# Patient Record
Sex: FEMALE | Race: BLACK OR AFRICAN AMERICAN | Employment: FULL TIME | URBAN - METROPOLITAN AREA
[De-identification: names, ages, dates, MRNs, and addresses within clinical notes are randomized per-mention and may not be internally consistent; named-entity substitution may affect disease eponyms.]

---

## 2017-01-03 ENCOUNTER — OFFICE VISIT (OUTPATIENT)
Dept: FAMILY MEDICINE CLINIC | Age: 47
End: 2017-01-03

## 2017-01-03 ENCOUNTER — OFFICE VISIT (OUTPATIENT)
Dept: BARIATRICS/WEIGHT MGMT | Age: 47
End: 2017-01-03

## 2017-01-03 ENCOUNTER — PATIENT MESSAGE (OUTPATIENT)
Dept: CARDIOLOGY CLINIC | Age: 47
End: 2017-01-03

## 2017-01-03 ENCOUNTER — OFFICE VISIT (OUTPATIENT)
Dept: CARDIOLOGY CLINIC | Age: 47
End: 2017-01-03

## 2017-01-03 VITALS
SYSTOLIC BLOOD PRESSURE: 130 MMHG | DIASTOLIC BLOOD PRESSURE: 76 MMHG | WEIGHT: 174 LBS | RESPIRATION RATE: 16 BRPM | HEART RATE: 66 BPM | BODY MASS INDEX: 27.31 KG/M2 | HEIGHT: 67 IN

## 2017-01-03 VITALS
OXYGEN SATURATION: 97 % | DIASTOLIC BLOOD PRESSURE: 87 MMHG | WEIGHT: 170.8 LBS | SYSTOLIC BLOOD PRESSURE: 134 MMHG | BODY MASS INDEX: 26.81 KG/M2 | HEIGHT: 67 IN | RESPIRATION RATE: 17 BRPM | HEART RATE: 75 BPM

## 2017-01-03 DIAGNOSIS — R94.31 ABNORMAL EKG: Primary | ICD-10-CM

## 2017-01-03 DIAGNOSIS — E78.5 DYSLIPIDEMIA: ICD-10-CM

## 2017-01-03 DIAGNOSIS — E66.9 OBESITY, UNSPECIFIED: Primary | ICD-10-CM

## 2017-01-03 DIAGNOSIS — K21.9 GASTROESOPHAGEAL REFLUX DISEASE WITHOUT ESOPHAGITIS: ICD-10-CM

## 2017-01-03 DIAGNOSIS — R06.00 DYSPNEA AND RESPIRATORY ABNORMALITIES: Primary | ICD-10-CM

## 2017-01-03 DIAGNOSIS — J45.20 MILD INTERMITTENT ASTHMA WITHOUT COMPLICATION: ICD-10-CM

## 2017-01-03 DIAGNOSIS — E66.3 OVERWEIGHT (BMI 25.0-29.9): ICD-10-CM

## 2017-01-03 DIAGNOSIS — Z13.6 SCREENING FOR ISCHEMIC HEART DISEASE: ICD-10-CM

## 2017-01-03 DIAGNOSIS — R06.89 DYSPNEA AND RESPIRATORY ABNORMALITIES: Primary | ICD-10-CM

## 2017-01-03 DIAGNOSIS — R94.31 EKG ABNORMALITIES: ICD-10-CM

## 2017-01-03 NOTE — MR AVS SNAPSHOT
Visit Information Date & Time Provider Department Dept. Phone Encounter #  
 1/3/2017  9:30 AM Derick Waller MD [address] [place] [address] [id number] Follow-up Instructions Return in about 1 month (around 2/3/2017). Upcoming Health Maintenance Date Due DTaP/Tdap/Td series (1 - Tdap) 8/13/1993 PAP AKA CERVICAL CYTOLOGY 8/13/1993 INFLUENZA AGE 9 TO ADULT 8/1/2016 Allergies as of 1/3/2017  Review Complete On: 1/3/2017 By: Sandy Jhaveri LPN No Known Allergies Current Immunizations  Never Reviewed No immunizations on file. Not reviewed this visit You Were Diagnosed With   
  
 Codes Comments Screening for ischemic heart disease    -  Primary ICD-10-CM: Z13.6 ICD-9-CM: V81.0 Abnormal EKG     ICD-10-CM: R94.31 
ICD-9-CM: 794.31 Overweight (BMI 25.0-29. 9)     ICD-10-CM: Q63.7 ICD-9-CM: 278.02 Vitals BP Pulse Resp Height(growth percentile) Weight(growth percentile) LMP  
 134/87 75 17 5' 6.5\" (1.689 m) 170 lb 12.8 oz (77.5 kg) 12/15/2016 SpO2 BMI OB Status Smoking Status 97% 27.15 kg/m2 Having regular periods Never Smoker BMI and BSA Data Body Mass Index Body Surface Area  
 27.15 kg/m 2 1.91 m 2 Preferred Pharmacy Pharmacy Name Phone [facility] [phone] Your Updated Medication List  
  
   
This list is accurate as of: 1/3/17 10:51 AM.  Always use your most recent med list.  
  
  
  
  
 albuterol 90 mcg/actuation inhaler Commonly known as:  PROVENTIL HFA, VENTOLIN HFA, PROAIR HFA Take  by inhalation. COPAXONE SC  
by SubCUTAneous route. Follow-up Instructions Return in about 1 month (around 2/3/2017). Referral Information Referral ID Referred By Referred To  
  
 [id number] Pola Sanchez MD   
   [id number] [place], [place] [address] [place] [address] Phone: [phone] Fax: [phone] Visits Status Start Date End Date 1 New Request 1/3/17 1/3/18 If your referral has a status of pending review or denied, additional information will be sent to support the outcome of this decision. Patient Instructions Resistance Training With Free Weights: Exercises Your Care Instructions Here are some examples of exercises for resistance training. Start each exercise slowly. Ease off the exercise if you start to have pain. Your doctor or physical therapist will tell you when you can start these exercises and which ones will work best for you. How to do the exercises Chest fly 1. Lie on a bench or exercise ball, and hold the weights straight up over your chest. Do not lock your elbows. You can keep them slightly bent if that is comfortable for you. 2. Slowly lower your arms, keeping them extended, until the weights are level with your chest, or slightly lower. 3. Slowly raise your arms until you are in the starting position. 4. Repeat 8 to 12 times. 5. Rest for a minute, and repeat the exercise. Lateral raise for the outer part of the shoulder (deltoid) 1. Stand with your feet shoulder-width apart and your knees slightly bent. 2. Bend your arms 90 degrees with your elbows at hip level. With your palms facing in, hold the weights straight in front of you. 3. Slowly lift the weights and your elbows out to the sides to shoulder level, keeping your elbows bent. Keep your shoulders down and relaxed as you lift. If you find you are shrugging your shoulders up toward your ears, your weights may be too heavy. 4. Slowly lower the weights back to your sides. 5. Repeat 8 to 12 times. 6. Rest for a minute, and repeat the exercise. Biceps curls 1. Sit leaning forward with your legs slightly spread and your left hand on your left thigh. 2. Hold the weight in your right hand, and place your right elbow on your right thigh. 3. Slowly curl the weight up and toward your chest. 
4. Slowly lower the weight to the original position. 5. Repeat 8 to 12 times. 6. Rest for a minute, and repeat the exercise. 7. Do the same exercise with your other arm. Follow-up care is a key part of your treatment and safety. Be sure to make and go to all appointments, and call your doctor if you are having problems. It's also a good idea to know your test results and keep a list of the medicines you take. Where can you learn more? Go to http://marv-uvaldo.info/. Enter B026 in the search box to learn more about \"Resistance Training With Free Weights: Exercises. \" Current as of: May 27, 2016 Content Version: 11.1 © 6087-2834 [employer]. Care instructions adapted under license by [employer] (which disclaims liability or warranty for this information). If you have questions about a medical condition or this instruction, always ask your healthcare professional. [address] any warranty or liability for your use of this information. Introducing [facility]! Kalina Aguiar introduces [employer] patient portal. Now you can access parts of your medical record, email your doctor's office, and request medication refills online. 1. In your internet browser, go to [email or web address] 2. Click on the First Time User? Click Here link in the Sign In box. You will see the New Member Sign Up page. 3. Enter your [employer] Access Code exactly as it appears below. You will not need to use this code after youve completed the sign-up process. If you do not sign up before the expiration date, you must request a new code. · [employer] Access Code: [id number] Vista Expires: 3/15/2017 12:36 PM 
 
4.  Enter the last four digits of your Social Security Number (xxxx) and Date of Birth (mm/dd/yyyy) as indicated and click Submit. You will be taken to the next sign-up page. 5. Create a [employer] ID. This will be your [employer] login ID and cannot be changed, so think of one that is secure and easy to remember. 6. Create a [employer] password. You can change your password at any time. 7. Enter your Password Reset Question and Answer. This can be used at a later time if you forget your password. 8. Enter your e-mail address. You will receive e-mail notification when new information is available in [address] 9. Click Sign Up. You can now view and download portions of your medical record. 10. Click the Download Summary menu link to download a portable copy of your medical information. If you have questions, please visit the Frequently Asked Questions section of the [employer] website. Remember, [employer] is NOT to be used for urgent needs. For medical emergencies, dial 911. Now available from your iPhone and Android! Please provide this summary of care documentation to your next provider. Your primary care clinician is listed as Timothy Roper. If you have any questions after today's visit, please call [phone].

## 2017-01-03 NOTE — PROGRESS NOTES
[facility] Progress Note: Initial Physician Visit     Phani Dunn is a 42 y.o. female who is here for medical screening for entering the [facility].   She was referred by a co-worker. Interested in weight loss. She is interested in 30-45 lbs weight loss  CC:  Obesity      Weight History  I personally reviewed the Medical Screening Shiloh Cagle completed by patient and scanned into media section of chart. It includes duration of their obesity, maximum weight, goal weight and weight gain time line (timing), all of which give the context of their obesity AND a Family History of their obesity. Is their Weight Loss Goal entered in to Comments? yes    Weight loss History  I personally reviewed the Medical Screening Shiloh Cagle completed by the patient and scanned into media section of chart. It includes number of weight loss attempts, the weight loss program that patients was most successful using, and if they have any hx of anorectic medication use, including OTC supplements. This captures modifying factors. Significant Medical History  Past Medical History   Diagnosis Date    Asthma     MS (multiple sclerosis) ([facility] [address]        I personally reviewed the Medical Screening Shiloh Cagle completed by the patient and scanned into media section of chart. This allows me to assess associated symptoms that are significant in the assessment of the patient's obesity and the patient's Past Medical History.           Significant Psychosocial History   Has a doctor every diagnosed with Binge Eating Disorder, Bulemia or Anorexia? : no     Compliance  Upcoming Travel? no    Social History  Social History   Substance Use Topics    Smoking status: Never Smoker    Smokeless tobacco: Never Used    Alcohol use 1.2 oz/week     2 Glasses of wine per week      Comment: 2 drinks per week       Exercise  I personally reviewed the [place] completed by the patient and scanned into media section of chart. Review of Systems  See HPI        Objective  Visit Vitals    /87    Pulse 75    Resp 17    Ht 5' 6.5\" (1.689 m)    Wt 170 lb 12.8 oz (77.5 kg)    LMP 12/15/2016    SpO2 97%    BMI 27.15 kg/m2         Weight Metrics 1/3/2017 1/3/2017 1/3/2017 12/15/2016 5/10/2016 4/2/2016   Weight - 174 lb 170 lb 12.8 oz 173 lb 14.4 oz 163 lb 161 lb 4.8 oz   Body Fat % 33.3 - - - - -   BMI - 27.66 kg/m2 27.15 kg/m2 27.86 kg/m2 26.32 kg/m2 26.05 kg/m2       Labs: See HDL labs scanned into Media section       Physical Exam    Vital Signs Reviewed  Weight Management Metrics Reviewed    Appearance: well  HEENT:  Scleral icterus?  no  Neck:  Thyromegaly or nodules? no  Mouth: Large tongue no  Heart:  RRR  Lungs:  clear  Abdomen:     Hepatomegaly? no   Striae present? no  Skin:    Acne?  no   Hirsutism? no   Skin tags? no   Acanthosis Nigricans?  no  Ext:  Edema? no      Assessment & Plan  Encounter Diagnoses   Name Primary?  Screening for ischemic heart disease     Abnormal EKG Yes    Overweight (BMI 25.0-29.9)        1. HDL labs reviewed w/ patient  2. EKG reviewed w/ patient:   Personally reviewed by me, abnormal    QTc = 403 sec (Upper limit is 440 for males & 460 for females)      3. Medication changes include: none    Based on his history, labs and EKG, Dinah Beal is not   a good candidate for the [facility] until evaluated by cardiology    25 min of the 45 minutes face to face time with Jeana Amaya consisted of counseling & coordinating and/or discussing treatment plans in reference to her  overweight The primary encounter diagnosis was Abnormal EKG. Diagnoses of Screening for ischemic heart disease and Overweight (BMI 25.0-29.9) were also pertinent to this visit. I am referring her to cardiology for clearance.  If cardiology clears her we will get her into the next class and start the product

## 2017-01-03 NOTE — PROGRESS NOTES
Chief Complaint   Patient presents with    Weight Management       Body Weight: 170.8  Body Fat%: 33.3  Muscle Mass Weight: 27.4  Body Water Weight: 09.7  Basal Metabolic Rate: 6071  BMI: 27.15

## 2017-01-03 NOTE — PATIENT INSTRUCTIONS
Resistance Training With Free Weights: Exercises  Your Care Instructions  Here are some examples of exercises for resistance training. Start each exercise slowly. Ease off the exercise if you start to have pain. Your doctor or physical therapist will tell you when you can start these exercises and which ones will work best for you. How to do the exercises  Chest fly    1. Lie on a bench or exercise ball, and hold the weights straight up over your chest. Do not lock your elbows. You can keep them slightly bent if that is comfortable for you. 2. Slowly lower your arms, keeping them extended, until the weights are level with your chest, or slightly lower. 3. Slowly raise your arms until you are in the starting position. 4. Repeat 8 to 12 times. 5. Rest for a minute, and repeat the exercise. Lateral raise for the outer part of the shoulder (deltoid)    1. Stand with your feet shoulder-width apart and your knees slightly bent. 2. Bend your arms 90 degrees with your elbows at hip level. With your palms facing in, hold the weights straight in front of you. 3. Slowly lift the weights and your elbows out to the sides to shoulder level, keeping your elbows bent. Keep your shoulders down and relaxed as you lift. If you find you are shrugging your shoulders up toward your ears, your weights may be too heavy. 4. Slowly lower the weights back to your sides. 5. Repeat 8 to 12 times. 6. Rest for a minute, and repeat the exercise. Biceps curls    1. Sit leaning forward with your legs slightly spread and your left hand on your left thigh. 2. Hold the weight in your right hand, and place your right elbow on your right thigh. 3. Slowly curl the weight up and toward your chest.  4. Slowly lower the weight to the original position. 5. Repeat 8 to 12 times. 6. Rest for a minute, and repeat the exercise. 7. Do the same exercise with your other arm. Follow-up care is a key part of your treatment and safety.  Be sure to make and go to all appointments, and call your doctor if you are having problems. It's also a good idea to know your test results and keep a list of the medicines you take. Where can you learn more? Go to http://marv-uvaldo.info/. Enter L273 in the search box to learn more about \"Resistance Training With Free Weights: Exercises. \"  Current as of: May 27, 2016  Content Version: 11.1  © 20064027-0534 [employer]. Care instructions adapted under license by [employer] (which disclaims liability or warranty for this information). If you have questions about a medical condition or this instruction, always ask your healthcare professional. [address] any warranty or liability for your use of this information.

## 2017-01-04 ENCOUNTER — TELEPHONE (OUTPATIENT)
Dept: CARDIOLOGY CLINIC | Age: 47
End: 2017-01-04

## 2017-01-04 RX ORDER — ERGOCALCIFEROL 1.25 MG/1
50000 CAPSULE ORAL
Qty: 6 CAP | Refills: 0 | Status: SHIPPED | OUTPATIENT
Start: 2017-01-04 | End: 2017-02-25

## 2017-01-04 RX ORDER — CHOLECALCIFEROL (VITAMIN D3) 125 MCG
CAPSULE ORAL
COMMUNITY
End: 2017-02-25

## 2017-01-04 RX ORDER — ERGOCALCIFEROL 1.25 MG/1
50000 CAPSULE ORAL
COMMUNITY
End: 2017-01-04 | Stop reason: SDUPTHER

## 2017-01-04 NOTE — TELEPHONE ENCOUNTER
Requested Prescriptions     Signed Prescriptions Disp Refills    ergocalciferol (ERGOCALCIFEROL) 50,000 unit capsule 6 Cap 0     Sig: Take 1 Cap by mouth every seven (7) days.      Authorizing Provider: Enrique Ramos     Ordering User: Marjorie Us

## 2017-01-05 ENCOUNTER — CLINICAL SUPPORT (OUTPATIENT)
Dept: CARDIOLOGY CLINIC | Age: 47
End: 2017-01-05

## 2017-01-05 DIAGNOSIS — R94.31 ABNORMAL EKG: Primary | ICD-10-CM

## 2017-01-05 DIAGNOSIS — R94.31 ECG ABNORMAL: Primary | ICD-10-CM

## 2017-01-05 NOTE — PROGRESS NOTES
See scanned report. Dr. Yamile Curry ordered study and Dr. Yamile Curry read study. ID verified per protocol.

## 2017-01-05 NOTE — PROGRESS NOTES
Nurse note from patient's weekly VLCD / LCD / Maintenance class was reviewed. Pertinent medical concerns were:   none     BP Readings from Last 3 Encounters:   01/03/17 130/76   01/03/17 134/87   12/15/16 126/82       Weight Metrics 1/3/2017 1/3/2017 1/3/2017 12/15/2016 5/10/2016 4/2/2016   Weight - 174 lb 170 lb 12.8 oz 173 lb 14.4 oz 163 lb 161 lb 4.8 oz   Body Fat % 33.3 - - - - -   BMI - 27.66 kg/m2 27.15 kg/m2 27.86 kg/m2 26.32 kg/m2 26.05 kg/m2       Current Outpatient Prescriptions   Medication Sig Dispense Refill    ergocalciferol (ERGOCALCIFEROL) 50,000 unit capsule Take 1 Cap by mouth every seven (7) days. 6 Cap 0    cholecalciferol, vitamin D3, (VITAMIN D3) 2,000 unit tab Take  by mouth.  albuterol (PROVENTIL HFA, VENTOLIN HFA, PROAIR HFA) 90 mcg/actuation inhaler Take  by inhalation.  GLATIRAMER ACETATE (COPAXONE SC) by SubCUTAneous route.

## 2017-01-06 ENCOUNTER — TELEPHONE (OUTPATIENT)
Dept: CARDIOLOGY CLINIC | Age: 47
End: 2017-01-06

## 2017-01-06 NOTE — TELEPHONE ENCOUNTER
The following test results given to patient per Dr. Liliana Brenner:    Stress Echo:  1/17, Normal Limit SE    2 pt identifiers used

## 2017-01-10 ENCOUNTER — OFFICE VISIT (OUTPATIENT)
Dept: BARIATRICS/WEIGHT MGMT | Age: 47
End: 2017-01-10

## 2017-01-10 VITALS
DIASTOLIC BLOOD PRESSURE: 82 MMHG | SYSTOLIC BLOOD PRESSURE: 124 MMHG | HEIGHT: 67 IN | BODY MASS INDEX: 26.53 KG/M2 | HEART RATE: 82 BPM | WEIGHT: 169 LBS

## 2017-01-10 DIAGNOSIS — E66.3 OVERWEIGHT (BMI 25.0-29.9): Primary | ICD-10-CM

## 2017-01-10 NOTE — PROGRESS NOTES
Progress Note: Weekly Education Class in the [facility]   Is there anything that you or the patient needs to let Dr Elias Beavers know about? no  Over the past week, have you experienced any side-effects? Yes, fatigue. Leatha Mendes is a 42 y.o. female who is enrolled in [facility]    Visit Vitals    /82    Pulse 82    Ht 5' 6.5\" (1.689 m)    Wt 169 lb (76.7 kg)    LMP 12/15/2016    BMI 26.87 kg/m2     Weight Metrics 1/10/2017 1/3/2017 1/3/2017 1/3/2017 12/15/2016 5/10/2016 4/2/2016   Weight 169 lb - 174 lb 170 lb 12.8 oz 173 lb 14.4 oz 163 lb 161 lb 4.8 oz   Waist Measure Inches 38.5 - - - - - -   Body Fat % - 33.3 - - - - -   BMI 26.87 kg/m2 - 27.66 kg/m2 27.15 kg/m2 27.86 kg/m2 26.32 kg/m2 26.05 kg/m2         Have you received any other medical care this week? no  If yes, where and for what? Have you had any change in your medications since your last visit? no  If yes what? Did you have any problems adhering to the program last week? no  If yes, please explain:       Eating Habits Over Last Week:  Did you take in 64 oz of non-caloric fluids? yes     Did you consume your 4 meal replacements each day?  yes       Physical Activity Over the Past Week:    Aerobic exercise: 0 min  Resistance exercise: 0 workouts / week

## 2017-01-11 NOTE — PROGRESS NOTES
Nurse note from patient's weekly VLCD / LCD / Maintenance class was reviewed. Pertinent medical concerns were:   none     BP Readings from Last 3 Encounters:   01/10/17 124/82   01/03/17 130/76   01/03/17 134/87       Weight Metrics 1/10/2017 1/3/2017 1/3/2017 1/3/2017 12/15/2016 5/10/2016 4/2/2016   Weight 169 lb - 174 lb 170 lb 12.8 oz 173 lb 14.4 oz 163 lb 161 lb 4.8 oz   Waist Measure Inches 38.5 - - - - - -   Body Fat % - 33.3 - - - - -   BMI 26.87 kg/m2 - 27.66 kg/m2 27.15 kg/m2 27.86 kg/m2 26.32 kg/m2 26.05 kg/m2       Current Outpatient Prescriptions   Medication Sig Dispense Refill    ergocalciferol (ERGOCALCIFEROL) 50,000 unit capsule Take 1 Cap by mouth every seven (7) days. 6 Cap 0    cholecalciferol, vitamin D3, (VITAMIN D3) 2,000 unit tab Take  by mouth.  albuterol (PROVENTIL HFA, VENTOLIN HFA, PROAIR HFA) 90 mcg/actuation inhaler Take  by inhalation.  GLATIRAMER ACETATE (COPAXONE SC) by SubCUTAneous route.

## 2017-01-17 ENCOUNTER — OFFICE VISIT (OUTPATIENT)
Dept: BARIATRICS/WEIGHT MGMT | Age: 47
End: 2017-01-17

## 2017-01-17 VITALS
HEART RATE: 67 BPM | BODY MASS INDEX: 26.21 KG/M2 | SYSTOLIC BLOOD PRESSURE: 114 MMHG | DIASTOLIC BLOOD PRESSURE: 79 MMHG | HEIGHT: 67 IN | WEIGHT: 167 LBS

## 2017-01-17 DIAGNOSIS — E66.3 OVERWEIGHT (BMI 25.0-29.9): Primary | ICD-10-CM

## 2017-01-17 NOTE — PROGRESS NOTES
Progress Note: Weekly Education Class in the [facility]   Is there anything that you or the patient needs to let Dr Tyra Huizar know about? no  Over the past week, have you experienced any side-effects? Yes, major constipation. Phani Dunn is a 42 y.o. female who is enrolled in [facility]    Visit Vitals    /79    Pulse 67    Ht 5' 6.5\" (1.689 m)    Wt 167 lb (75.8 kg)    BMI 26.55 kg/m2     Weight Metrics 1/17/2017 1/10/2017 1/3/2017 1/3/2017 1/3/2017 12/15/2016 5/10/2016   Weight 167 lb 169 lb - 174 lb 170 lb 12.8 oz 173 lb 14.4 oz 163 lb   Waist Measure Inches 35 38.5 - - - - -   Body Fat % - - 33.3 - - - -   BMI 26.55 kg/m2 26.87 kg/m2 - 27.66 kg/m2 27.15 kg/m2 27.86 kg/m2 26.32 kg/m2         Have you received any other medical care this week? no  If yes, where and for what? Have you had any change in your medications since your last visit? no  If yes what? Did you have any problems adhering to the program last week? no  If yes, please explain:       Eating Habits Over Last Week:  Did you take in 64 oz of non-caloric fluids? yes     Did you consume your 4 meal replacements each day?  yes       Physical Activity Over the Past Week:    Aerobic exercise: 0 min  Resistance exercise: 0 workouts / week

## 2017-01-18 NOTE — PROGRESS NOTES
Nurse note from patient's weekly VLCD / LCD / Maintenance class was reviewed. Pertinent medical concerns were:   none     BP Readings from Last 3 Encounters:   01/17/17 114/79   01/10/17 124/82   01/03/17 130/76       Weight Metrics 1/17/2017 1/10/2017 1/3/2017 1/3/2017 1/3/2017 12/15/2016 5/10/2016   Weight 167 lb 169 lb - 174 lb 170 lb 12.8 oz 173 lb 14.4 oz 163 lb   Waist Measure Inches 35 38.5 - - - - -   Body Fat % - - 33.3 - - - -   BMI 26.55 kg/m2 26.87 kg/m2 - 27.66 kg/m2 27.15 kg/m2 27.86 kg/m2 26.32 kg/m2       Current Outpatient Prescriptions   Medication Sig Dispense Refill    ergocalciferol (ERGOCALCIFEROL) 50,000 unit capsule Take 1 Cap by mouth every seven (7) days. 6 Cap 0    cholecalciferol, vitamin D3, (VITAMIN D3) 2,000 unit tab Take  by mouth.  albuterol (PROVENTIL HFA, VENTOLIN HFA, PROAIR HFA) 90 mcg/actuation inhaler Take  by inhalation.  GLATIRAMER ACETATE (COPAXONE SC) by SubCUTAneous route.

## 2017-01-24 ENCOUNTER — OFFICE VISIT (OUTPATIENT)
Dept: BARIATRICS/WEIGHT MGMT | Age: 47
End: 2017-01-24

## 2017-01-24 VITALS
DIASTOLIC BLOOD PRESSURE: 80 MMHG | WEIGHT: 164 LBS | HEIGHT: 67 IN | SYSTOLIC BLOOD PRESSURE: 123 MMHG | BODY MASS INDEX: 25.74 KG/M2 | HEART RATE: 75 BPM

## 2017-01-24 DIAGNOSIS — E66.3 OVERWEIGHT (BMI 25.0-29.9): Primary | ICD-10-CM

## 2017-01-27 NOTE — PROGRESS NOTES
Nurse note from patient's weekly VLCD / LCD / Maintenance class was reviewed. Pertinent medical concerns were:   none     BP Readings from Last 3 Encounters:   01/24/17 123/80   01/17/17 114/79   01/10/17 124/82       Weight Metrics 1/24/2017 1/17/2017 1/10/2017 1/3/2017 1/3/2017 1/3/2017 12/15/2016   Weight 164 lb 167 lb 169 lb - 174 lb 170 lb 12.8 oz 173 lb 14.4 oz   Waist Measure Inches 35 35 38.5 - - - -   Body Fat % - - - 33.3 - - -   BMI 26.07 kg/m2 26.55 kg/m2 26.87 kg/m2 - 27.66 kg/m2 27.15 kg/m2 27.86 kg/m2       Current Outpatient Prescriptions   Medication Sig Dispense Refill    ergocalciferol (ERGOCALCIFEROL) 50,000 unit capsule Take 1 Cap by mouth every seven (7) days. 6 Cap 0    cholecalciferol, vitamin D3, (VITAMIN D3) 2,000 unit tab Take  by mouth.  albuterol (PROVENTIL HFA, VENTOLIN HFA, PROAIR HFA) 90 mcg/actuation inhaler Take  by inhalation.  GLATIRAMER ACETATE (COPAXONE SC) by SubCUTAneous route.

## 2017-01-31 ENCOUNTER — OFFICE VISIT (OUTPATIENT)
Dept: BARIATRICS/WEIGHT MGMT | Age: 47
End: 2017-01-31

## 2017-01-31 DIAGNOSIS — E66.3 OVERWEIGHT (BMI 25.0-29.9): Primary | ICD-10-CM

## 2017-02-01 VITALS
SYSTOLIC BLOOD PRESSURE: 113 MMHG | BODY MASS INDEX: 25.27 KG/M2 | HEART RATE: 73 BPM | WEIGHT: 161 LBS | HEIGHT: 67 IN | DIASTOLIC BLOOD PRESSURE: 79 MMHG

## 2017-02-01 NOTE — PROGRESS NOTES
Progress Note: Weekly Education Class in the [facility] Loss Program   Is there anything that you or the patient needs to let Dr Chey Gerard know about? no  Over the past week, have you experienced any side-effects? no    Skylar Alejandra is a 42 y.o. female who is enrolled in [facility]    Visit Vitals    /79    Pulse 73    Ht 5' 6.5\" (1.689 m)    Wt 161 lb (73 kg)    BMI 25.6 kg/m2     Weight Metrics 2/1/2017 1/31/2017 1/24/2017 1/17/2017 1/10/2017 1/3/2017 1/3/2017   Weight - 161 lb 164 lb 167 lb 169 lb - 174 lb   Waist Measure Inches 34.5 - 35 35 38.5 - -   Body Fat % - - - - - 33.3 -   BMI - 25.6 kg/m2 26.07 kg/m2 26.55 kg/m2 26.87 kg/m2 - 27.66 kg/m2         Have you received any other medical care this week? no  If yes, where and for what? Have you had any change in your medications since your last visit? no  If yes what? Did you have any problems adhering to the program last week? no  If yes, please explain:       Eating Habits Over Last Week:  Did you take in 64 oz of non-caloric fluids? yes     Did you consume your 4 meal replacements each day? Yes, 2 scallops, and grilled asparagus.        Physical Activity Over the Past Week:    Aerobic exercise: 0 min  Resistance exercise: 0 workouts / week

## 2017-02-01 NOTE — PROGRESS NOTES
Nurse note from patient's weekly VLCD / LCD / Maintenance class was reviewed. Pertinent medical concerns were:        BP Readings from Last 3 Encounters:   01/24/17 123/80   01/17/17 114/79   01/10/17 124/82       Weight Metrics 1/24/2017 1/17/2017 1/10/2017 1/3/2017 1/3/2017 1/3/2017 12/15/2016   Weight 164 lb 167 lb 169 lb - 174 lb 170 lb 12.8 oz 173 lb 14.4 oz   Waist Measure Inches 35 35 38.5 - - - -   Body Fat % - - - 33.3 - - -   BMI 26.07 kg/m2 26.55 kg/m2 26.87 kg/m2 - 27.66 kg/m2 27.15 kg/m2 27.86 kg/m2       Current Outpatient Prescriptions   Medication Sig Dispense Refill    ergocalciferol (ERGOCALCIFEROL) 50,000 unit capsule Take 1 Cap by mouth every seven (7) days. 6 Cap 0    cholecalciferol, vitamin D3, (VITAMIN D3) 2,000 unit tab Take  by mouth.  albuterol (PROVENTIL HFA, VENTOLIN HFA, PROAIR HFA) 90 mcg/actuation inhaler Take  by inhalation.  GLATIRAMER ACETATE (COPAXONE SC) by SubCUTAneous route.

## 2017-02-07 ENCOUNTER — CLINICAL SUPPORT (OUTPATIENT)
Dept: BARIATRICS/WEIGHT MGMT | Age: 47
End: 2017-02-07

## 2017-02-07 DIAGNOSIS — E66.3 OVERWEIGHT (BMI 25.0-29.9): Primary | ICD-10-CM

## 2017-02-08 VITALS
HEIGHT: 67 IN | HEART RATE: 77 BPM | WEIGHT: 159 LBS | BODY MASS INDEX: 24.96 KG/M2 | SYSTOLIC BLOOD PRESSURE: 117 MMHG | DIASTOLIC BLOOD PRESSURE: 77 MMHG

## 2017-02-08 NOTE — PROGRESS NOTES
Progress Note: Weekly Education Class in the [facility]   Is there anything that you or the patient needs to let Dr Hiro Howard know about? no  Over the past week, have you experienced any side-effects? no    Duglas Moreno is a 42 y.o. female who is enrolled in [facility]    Visit Vitals    /77    Pulse 77    Ht 5' 6.5\" (1.689 m)    Wt 159 lb (72.1 kg)    BMI 25.28 kg/m2     Weight Metrics 2/8/2017 2/7/2017 2/1/2017 1/31/2017 1/24/2017 1/17/2017 1/10/2017   Weight - 159 lb - 161 lb 164 lb 167 lb 169 lb   Waist Measure Inches 33 - 34.5 - 35 35 38.5   Body Fat % - - - - - - -   BMI - 25.28 kg/m2 - 25.6 kg/m2 26.07 kg/m2 26.55 kg/m2 26.87 kg/m2         Have you received any other medical care this week? no  If yes, where and for what? Have you had any change in your medications since your last visit? no  If yes what? Did you have any problems adhering to the program last week? no  If yes, please explain:       Eating Habits Over Last Week:  Did you take in 64 oz of non-caloric fluids? yes     Did you consume your 4 meal replacements each day?  yes       Physical Activity Over the Past Week:    Aerobic exercise: 0 min  Resistance exercise: 0 workouts / week

## 2017-02-14 ENCOUNTER — OFFICE VISIT (OUTPATIENT)
Dept: FAMILY MEDICINE CLINIC | Age: 47
End: 2017-02-14

## 2017-02-14 VITALS
TEMPERATURE: 97.8 F | OXYGEN SATURATION: 98 % | HEIGHT: 66 IN | BODY MASS INDEX: 25.22 KG/M2 | RESPIRATION RATE: 17 BRPM | SYSTOLIC BLOOD PRESSURE: 119 MMHG | WEIGHT: 156.9 LBS | HEART RATE: 74 BPM | DIASTOLIC BLOOD PRESSURE: 8 MMHG

## 2017-02-14 DIAGNOSIS — E16.1 HYPERINSULINEMIA: ICD-10-CM

## 2017-02-14 DIAGNOSIS — K59.00 CONSTIPATION, UNSPECIFIED CONSTIPATION TYPE: Primary | ICD-10-CM

## 2017-02-14 DIAGNOSIS — E78.49 OTHER HYPERLIPIDEMIA: ICD-10-CM

## 2017-02-14 NOTE — MR AVS SNAPSHOT
Visit Information Date & Time Provider Department Dept. Phone Encounter #  
 2/14/2017 10:30 AM Pam Colindres MD [address] [place] [address] [id number] Follow-up Instructions Return in about 1 month (around 3/14/2017). Upcoming Health Maintenance Date Due Pneumococcal 19-64 Medium Risk (1 of 1 - PPSV23) 8/13/1991 DTaP/Tdap/Td series (1 - Tdap) 8/13/1993 PAP AKA CERVICAL CYTOLOGY 8/13/1993 INFLUENZA AGE 9 TO ADULT 8/1/2016 Allergies as of 2/14/2017  Review Complete On: 2/14/2017 By: Jefferson Ibrahim LPN No Known Allergies Current Immunizations  Never Reviewed No immunizations on file. Not reviewed this visit You Were Diagnosed With   
  
 Codes Comments Constipation, unspecified constipation type    -  Primary ICD-10-CM: K59.00 ICD-9-CM: 564.00 Other hyperlipidemia     ICD-10-CM: E78.4 ICD-9-CM: 272.4 Hyperinsulinemia     ICD-10-CM: E16.1 ICD-9-CM: 251.1 Vitals BP Pulse Temp Resp Height(growth percentile) Weight(growth percentile) (!) 119/8 74 97.8 °F (36.6 °C) (Oral) 17 5' 6\" (1.676 m) 156 lb 14.4 oz (71.2 kg) SpO2 BMI OB Status Smoking Status 98% 25.32 kg/m2 Having regular periods Never Smoker BMI and BSA Data Body Mass Index Body Surface Area  
 25.32 kg/m 2 1.82 m 2 Preferred Pharmacy Pharmacy Name Phone [facility] [phone] Your Updated Medication List  
  
   
This list is accurate as of: 2/14/17 10:46 AM.  Always use your most recent med list.  
  
  
  
  
 albuterol 90 mcg/actuation inhaler Commonly known as:  PROVENTIL HFA, VENTOLIN HFA, PROAIR HFA Take  by inhalation. COPAXONE SC  
by SubCUTAneous route. docusate sodium 50 mg capsule Commonly known as:  Ledell Maria Elena Take 1 Cap by mouth two (2) times a day for 90 days. ergocalciferol 50,000 unit capsule Commonly known as:  ERGOCALCIFEROL Take 1 Cap by mouth every seven (7) days. VITAMIN D3 2,000 unit Tab Generic drug:  cholecalciferol (vitamin D3) Take  by mouth. Prescriptions Sent to Pharmacy Refills  
 docusate sodium (COLACE) 50 mg capsule 2 Sig: Take 1 Cap by mouth two (2) times a day for 90 days. Class: Normal  
 Pharmacy: [facility] [address] #: [phone] Route: Oral  
  
Follow-up Instructions Return in about 1 month (around 3/14/2017). Patient Instructions Constipation: Care Instructions Your Care Instructions Constipation means that you have a hard time passing stools (bowel movements). People pass stools from 3 times a day to once every 3 days. What is normal for you may be different. Constipation may occur with pain in the rectum and cramping. The pain may get worse when you try to pass stools. Sometimes there are small amounts of bright red blood on toilet paper or the surface of stools. This is because of enlarged veins near the rectum (hemorrhoids). A few changes in your diet and lifestyle may help you avoid ongoing constipation. Your doctor may also prescribe medicine to help loosen your stool. Some medicines can cause constipation. These include pain medicines and antidepressants. Tell your doctor about all the medicines you take. Your doctor may want to make a medicine change to ease your symptoms. Follow-up care is a key part of your treatment and safety. Be sure to make and go to all appointments, and call your doctor if you are having problems. It's also a good idea to know your test results and keep a list of the medicines you take. How can you care for yourself at home? · Drink plenty of fluids, enough so that your urine is light yellow or clear like water.  If you have kidney, heart, or liver disease and have to limit fluids, talk with your doctor before you increase the amount of fluids you drink. · Include high-fiber foods in your diet each day. These include fruits, vegetables, beans, and whole grains. · Get at least 30 minutes of exercise on most days of the week. Walking is a good choice. You also may want to do other activities, such as running, swimming, cycling, or playing tennis or team sports. · Take a fiber supplement, such as Citrucel or Metamucil, every day. Read and follow all instructions on the label. · Schedule time each day for a bowel movement. A daily routine may help. Take your time having your bowel movement. · Support your feet with a small step stool when you sit on the toilet. This helps flex your hips and places your pelvis in a squatting position. · Your doctor may recommend an over-the-counter laxative to relieve your constipation. Examples are Milk of Magnesia and MiraLax. Read and follow all instructions on the label. Do not use laxatives on a long-term basis. When should you call for help? Call your doctor now or seek immediate medical care if: 
· You have new or worse belly pain. · You have new or worse nausea or vomiting. · You have blood in your stools. Watch closely for changes in your health, and be sure to contact your doctor if: 
· Your constipation is getting worse. · You do not get better as expected. Where can you learn more? Go to http://marv-uvaldo.info/. Enter 21 [phone] in the search box to learn more about \"Constipation: Care Instructions. \" Current as of: May 27, 2016 Content Version: 11.1 © 1036-0935 [employer]. Care instructions adapted under license by [employer] (which disclaims liability or warranty for this information).  If you have questions about a medical condition or this instruction, always ask your healthcare professional. [address] any warranty or liability for your use of this information. Introducing [facility]! Dear Andrés Smith: Thank you for requesting a [employer] account. Our records indicate that you already have an active Cur[employer] account. You can access your account anytime at [email or web address] Did you know that you can access your hospital and ER discharge instructions at any time in Cur[employer]? You can also review all of your test results from your hospital stay or ER visit. Additional Information If you have questions, please visit the Frequently Asked Questions section of the Cur[employer] website at [email or web address] Remember, Curazy is NOT to be used for urgent needs. For medical emergencies, dial 911. Now available from your iPhone and Android! Please provide this summary of care documentation to your next provider. Your primary care clinician is listed as Sonido Lau. If you have any questions after today's visit, please call [phone].

## 2017-02-14 NOTE — PROGRESS NOTES
[facility] Progress Note:   F/up Physician Visit    CC: Weight Management      She wants to increase the goal weight to 145 lbs    Eva Mandel is a 42 y.o. female who is here for her  f/up physician visit for the VLCD / LCD Program.    Weight Metrics 2/14/2017 2/14/2017 2/8/2017 2/7/2017 2/1/2017 1/31/2017 1/24/2017   Weight - 156 lb 14.4 oz - 159 lb - 161 lb 164 lb   Neck Circ (inches) 12 - - - - - -   Waist Measure Inches 35.5 - 33 - 34.5 - 35   Exercise Mins/week 180 - - - - - -   Body Fat % 30 - - - - - -   BMI - 25.32 kg/m2 - 25.28 kg/m2 - 25.6 kg/m2 26.07 kg/m2         Outpatient Prescriptions Marked as Taking for the 2/14/17 encounter (Office Visit) with Salud Alamo MD   Medication Sig Dispense Refill    docusate sodium (COLACE) 50 mg capsule Take 1 Cap by mouth two (2) times a day for 90 days. 60 Cap 2    ergocalciferol (ERGOCALCIFEROL) 50,000 unit capsule Take 1 Cap by mouth every seven (7) days. 6 Cap 0    cholecalciferol, vitamin D3, (VITAMIN D3) 2,000 unit tab Take  by mouth.  albuterol (PROVENTIL HFA, VENTOLIN HFA, PROAIR HFA) 90 mcg/actuation inhaler Take  by inhalation.  GLATIRAMER ACETATE (COPAXONE SC) by SubCUTAneous route. Participation   Did you attend clinic and class last week? no    Review of Systems  Since your last visit, have you experienced any complications? no  If yes, please list:       Are you taking an appetite suppressant? no  If so, is there any Chest Pain, Palpitations or Dizziness? BP Readings from Last 3 Encounters:   02/14/17 (!) 119/8   02/08/17 117/77   02/01/17 113/79         Have you received any other medical care this week? no  If yes, where and for what? Have you discontinued or changed any medicine or dose of your medicine since your last visit with Dr Marine Bobo? no  If yes, where and for what? Diet  How many ounces of calorie-free liquids did you consume each day?   64 oz    How many meal replacements did you take each day? 4    Did you have any problems adhering to the program?  no If yes, please explain:      Exercise  Aerobic exercise: 180 min  Resistance exercise: 4-5 workouts / week  Any discomfort?  no     If yes, where? Objective  Visit Vitals    BP (!) 119/8    Pulse 74    Temp 97.8 °F (36.6 °C) (Oral)    Resp 17    Ht 5' 6\" (1.676 m)    Wt 156 lb 14.4 oz (71.2 kg)    SpO2 98%    BMI 25.32 kg/m2     No LMP recorded. Physical Exam  Appearance: well,   Mental:A&O x 3, NAD  H:NC/AT,  EENT:   EOMI, PERRL, No scleral icterus  Neck: no bruit or JVD  Lung: clear, No W/R  ABD: soft, active, nontender  Ext:  no Edema  Neuro: nonfocal  Assessment / Plan    Encounter Diagnoses   Name Primary?  Constipation, unspecified constipation type Yes    Other hyperlipidemia     Hyperinsulinemia        1. Weight management well controlled   Progress was reviewed with patient    2. Labs    Latest results reviewed with patient   Lab slip given to pt for f/up HDL labs      15 minutes of the 30 minutes face to face time with Masood Bright consisted of counseling & coordinating and/or discussing treatment plans in reference to her The primary encounter diagnosis was Constipation, unspecified constipation type. Diagnoses of Other hyperlipidemia and Hyperinsulinemia were also pertinent to this visit.

## 2017-02-14 NOTE — PROGRESS NOTES
1. Have you been to the ER, urgent care clinic since your last visit? Hospitalized since your last visit? No    2. Have you seen or consulted any other health care providers outside of the [address] [place] [address] since your last visit? Include any pap smears or colon screening.  No     Chief Complaint   Patient presents with    Weight Management       Body Weight: 156.9  Body Fat%: 30  Muscle Mass Weight: 25.1  Body Water Weight: 20.7  Basal Metabolic Rate: 3586  BMI: 25.32

## 2017-02-23 ENCOUNTER — CLINICAL SUPPORT (OUTPATIENT)
Dept: BARIATRICS/WEIGHT MGMT | Age: 47
End: 2017-02-23

## 2017-02-23 VITALS
WEIGHT: 154 LBS | HEART RATE: 75 BPM | BODY MASS INDEX: 24.75 KG/M2 | SYSTOLIC BLOOD PRESSURE: 127 MMHG | HEIGHT: 66 IN | DIASTOLIC BLOOD PRESSURE: 87 MMHG

## 2017-02-23 DIAGNOSIS — E66.3 OVERWEIGHT (BMI 25.0-29.9): Primary | ICD-10-CM

## 2017-02-23 NOTE — PROGRESS NOTES
Progress Note: Weekly Education Class in the [facility]   Is there anything that you or the patient needs to let Dr Jordyn Beck know about? no  Over the past week, have you experienced any side-effects? no    Sophie Beck is a 42 y.o. female who is enrolled in [facility]    Visit Vitals    /87    Pulse 75    Ht 5' 6\" (1.676 m)    Wt 154 lb (69.9 kg)    BMI 24.86 kg/m2     Weight Metrics 2/23/2017 2/14/2017 2/14/2017 2/8/2017 2/7/2017 2/1/2017 1/31/2017   Weight 154 lb - 156 lb 14.4 oz - 159 lb - 161 lb   Neck Circ (inches) - 12 - - - - -   Waist Measure Inches 31 35.5 - 33 - 34.5 -   Exercise Mins/week - 180 - - - - -   Body Fat % - 30 - - - - -   BMI 24.86 kg/m2 - 25.32 kg/m2 - 25.28 kg/m2 - 25.6 kg/m2         Have you received any other medical care this week? no  If yes, where and for what? Have you had any change in your medications since your last visit? no  If yes what? Did you have any problems adhering to the program last week? no  If yes, please explain:       Eating Habits Over Last Week:  Did you take in 64 oz of non-caloric fluids? yes     Did you consume your 4 meal replacements each day? yes with crab cake and brussel sprouts on Tuesday.        Physical Activity Over the Past Week:    Aerobic exercise: 0 min  Resistance exercise: 0 workouts / week

## 2017-02-24 NOTE — PROGRESS NOTES
Nurse note from patient's weekly VLCD / LCD / Maintenance class was reviewed. Pertinent medical concerns were:   Doing well     BP Readings from Last 3 Encounters:   02/23/17 127/87   02/14/17 (!) 119/8   02/08/17 117/77       Weight Metrics 2/23/2017 2/14/2017 2/14/2017 2/8/2017 2/7/2017 2/1/2017 1/31/2017   Weight 154 lb - 156 lb 14.4 oz - 159 lb - 161 lb   Neck Circ (inches) - 12 - - - - -   Waist Measure Inches 31 35.5 - 33 - 34.5 -   Exercise Mins/week - 180 - - - - -   Body Fat % - 30 - - - - -   BMI 24.86 kg/m2 - 25.32 kg/m2 - 25.28 kg/m2 - 25.6 kg/m2       Current Outpatient Prescriptions   Medication Sig Dispense Refill    docusate sodium (COLACE) 50 mg capsule Take 1 Cap by mouth two (2) times a day for 90 days. 60 Cap 2    ergocalciferol (ERGOCALCIFEROL) 50,000 unit capsule Take 1 Cap by mouth every seven (7) days. 6 Cap 0    cholecalciferol, vitamin D3, (VITAMIN D3) 2,000 unit tab Take  by mouth.  albuterol (PROVENTIL HFA, VENTOLIN HFA, PROAIR HFA) 90 mcg/actuation inhaler Take  by inhalation.  GLATIRAMER ACETATE (COPAXONE SC) by SubCUTAneous route.        Doing well

## 2017-02-25 ENCOUNTER — OFFICE VISIT (OUTPATIENT)
Dept: FAMILY MEDICINE CLINIC | Facility: CLINIC | Age: 47
End: 2017-02-25

## 2017-02-25 VITALS
HEIGHT: 66 IN | BODY MASS INDEX: 24.59 KG/M2 | HEART RATE: 77 BPM | DIASTOLIC BLOOD PRESSURE: 71 MMHG | TEMPERATURE: 98.5 F | RESPIRATION RATE: 17 BRPM | SYSTOLIC BLOOD PRESSURE: 107 MMHG | WEIGHT: 153 LBS

## 2017-02-25 DIAGNOSIS — J30.2 SEASONAL ALLERGIC RHINITIS, UNSPECIFIED ALLERGIC RHINITIS TRIGGER: ICD-10-CM

## 2017-02-25 DIAGNOSIS — B96.89 ACUTE BACTERIAL RHINOSINUSITIS: Primary | ICD-10-CM

## 2017-02-25 DIAGNOSIS — J01.90 ACUTE BACTERIAL RHINOSINUSITIS: Primary | ICD-10-CM

## 2017-02-25 RX ORDER — AMOXICILLIN AND CLAVULANATE POTASSIUM 875; 125 MG/1; MG/1
1 TABLET, FILM COATED ORAL 2 TIMES DAILY
Qty: 14 TAB | Refills: 0 | Status: SHIPPED | OUTPATIENT
Start: 2017-02-25 | End: 2017-03-04

## 2017-02-25 RX ORDER — FLUTICASONE PROPIONATE 50 MCG
SPRAY, SUSPENSION (ML) NASAL
Qty: 1 BOTTLE | Refills: 3
Start: 2017-02-25 | End: 2018-09-13 | Stop reason: ALTCHOICE

## 2017-02-25 NOTE — MR AVS SNAPSHOT
Visit Information Date & Time Provider Department Dept. Phone Encounter #  
 2/25/2017  8:45 AM Elisa Kan, [address] [phone] [id number] Your Appointments 3/2/2017  8:45 AM  
Nurse Visit with WEEKLY [facility] (Jarad Kauffman) Appt Note: Weekly triage [address] [address] [address] [address] [phone]  
  
   
 [address] [address]  
  
    
 3/9/2017  8:45 AM  
Nurse Visit with WEEKLY [facility] (Jarad Kauffman) Appt Note: Weekly triage [address] [address] [address] [address] [phone] Upcoming Health Maintenance Date Due Pneumococcal 19-64 Medium Risk (1 of 1 - PPSV23) 8/13/1991 DTaP/Tdap/Td series (1 - Tdap) 8/13/1993 PAP AKA CERVICAL CYTOLOGY 8/13/1993 INFLUENZA AGE 9 TO ADULT 8/1/2016 Allergies as of 2/25/2017  Review Complete On: 2/25/2017 By: Elisa Kan NP No Known Allergies Current Immunizations  Never Reviewed No immunizations on file. Not reviewed this visit You Were Diagnosed With   
  
 Codes Comments Acute bacterial rhinosinusitis    -  Primary ICD-10-CM: J01.90, B96.89 
ICD-9-CM: 461.9 Seasonal allergic rhinitis, unspecified allergic rhinitis trigger     ICD-10-CM: J30.2 ICD-9-CM: 477.9 Vitals BP  
  
  
  
  
  
 107/71 BMI and BSA Data Body Mass Index Body Surface Area  
 24.69 kg/m 2 1.8 m 2 Preferred Pharmacy Pharmacy Name Phone [id number] [phone], [address] [phone] Your Updated Medication List  
  
   
This list is accurate as of: 2/25/17  9:03 AM.  Always use your most recent med list.  
  
  
  
  
 albuterol 90 mcg/actuation inhaler Commonly known as:  PROVENTIL HFA, VENTOLIN HFA, PROAIR HFA Take 2 Puffs by inhalation every four (4) hours as needed. amoxicillin-clavulanate 875-125 mg per tablet Commonly known as:  AUGMENTIN Take 1 Tab by mouth two (2) times a day for 7 days. COPAXONE SC  
1 Dose by SubCUTAneous route daily. docusate sodium 50 mg capsule Commonly known as:  Harrie Halo Take 1 Cap by mouth two (2) times a day for 90 days. fluticasone 50 mcg/actuation nasal spray Commonly known as:  FLONASE  
2 sprays each nostril one time daily for 30 days. Prescriptions Sent to Pharmacy Refills  
 amoxicillin-clavulanate (AUGMENTIN) 875-125 mg per tablet 0 Sig: Take 1 Tab by mouth two (2) times a day for 7 days. Class: Normal  
 Pharmacy: [address] [place] [address] Ph #: [phone] Route: Oral  
  
To-Do List   
 03/14/2017 9:15 AM  
  Appointment with Lara Sr MD at . [address] ([phone]) Patient Instructions Sinusitis: Care Instructions Your Care Instructions Sinusitis is an infection of the lining of the sinus cavities in your head. Sinusitis often follows a cold. It causes pain and pressure in your head and face. In most cases, sinusitis gets better on its own in 1 to 2 weeks. But some mild symptoms may last for several weeks. Sometimes antibiotics are needed. Follow-up care is a key part of your treatment and safety. Be sure to make and go to all appointments, and call your doctor if you are having problems. It's also a good idea to know your test results and keep a list of the medicines you take. How can you care for yourself at home? · Take an over-the-counter pain medicine, such as acetaminophen (Tylenol), ibuprofen (Advil, Motrin), or naproxen (Aleve). Read and follow all instructions on the label. · If the doctor prescribed antibiotics, take them as directed. Do not stop taking them just because you feel better. You need to take the full course of antibiotics. · Be careful when taking over-the-counter cold or flu medicines and Tylenol at the same time. Many of these medicines have acetaminophen, which is Tylenol. Read the labels to make sure that you are not taking more than the recommended dose. Too much acetaminophen (Tylenol) can be harmful. · Breathe warm, moist air from a steamy shower, a hot bath, or a sink filled with hot water. Avoid cold, dry air. Using a humidifier in your home may help. Follow the directions for cleaning the machine. · Use saline (saltwater) nasal washes to help keep your nasal passages open and wash out mucus and bacteria. You can buy saline nose drops at a grocery store or drugstore. Or you can make your own at home by adding 1 teaspoon of salt and 1 teaspoon of baking soda to 2 cups of distilled water. If you make your own, fill a bulb syringe with the solution, insert the tip into your nostril, and squeeze gently. Nyoka Mano your nose. · Put a hot, wet towel or a warm gel pack on your face 3 or 4 times a day for 5 to 10 minutes each time. · Try a decongestant nasal spray like oxymetazoline (Afrin). Do not use it for more than 3 days in a row. Using it for more than 3 days can make your congestion worse. When should you call for help? Call your doctor now or seek immediate medical care if: 
· You have new or worse swelling or redness in your face or around your eyes. · You have a new or higher fever. Watch closely for changes in your health, and be sure to contact your doctor if: 
· You have new or worse facial pain. · The mucus from your nose becomes thicker (like pus) or has new blood in it. · You are not getting better as expected. Where can you learn more? Go to http://marv-uvaldo.info/. Enter Z162 in the search box to learn more about \"Sinusitis: Care Instructions. \" Current as of: July 29, 2016 Content Version: 11.1 © 7955-9841 [employer].  Care instructions adapted under license by [address] (which disclaims liability or warranty for this information). If you have questions about a medical condition or this instruction, always ask your healthcare professional. [address] any warranty or liability for your use of this information. Amoxicillin/Clavulanate Potassium (By mouth) Amoxicillin (u-nbz-t-JOSEY-in), Clavulanate Potassium (SMYD-bp-md-alec vqm-DBY-ya-um) Treats infections. This medicine is a penicillin antibiotic. Brand Name(s):Augmentin, Augmentin ES-600, Augmentin XR There may be other brand names for this medicine. When This Medicine Should Not Be Used: This medicine is not right for everyone. Do not use it if you had an allergic reaction to amoxicillin, clavulanate, or a similar antibiotic (penicillin or cephalosporin), or if you had liver problems caused by Augmentin®. How to Use This Medicine:  
Liquid, Tablet, Chewable Tablet, Long Acting Tablet · Your doctor will tell you how much medicine to use. Do not use more than directed. · Take this medicine with a snack or at the beginning of a meal to help prevent nausea. · Chewable tablets: Chew the tablet completely before you swallow it. · Measure the oral liquid medicine with a marked measuring spoon, oral syringe, or medicine cup. Shake the medicine well just before you measure each dose. Rinse the spoon or dropper after each use. · Swallow the extended-release tablet whole. Do not crush, break, or chew it. · Take all of the medicine in your prescription to clear up your infection, even if you feel better after the first few doses. · Missed dose: Take a dose as soon as you remember. If it is almost time for your next dose, wait until then and take a regular dose. Do not take extra medicine to make up for a missed dose. · Tablet, extended-release tablet, chewable tablet: Store at room temperature, away from heat, moisture, and direct light. · Oral liquid: Store in the refrigerator. Do not freeze. · Throw away any unused oral liquid after 10 days. Drugs and Foods to Avoid: Ask your doctor or pharmacist before using any other medicine, including over-the-counter medicines, vitamins, and herbal products. · Some medicines can affect how this medicine works. Tell your doctor if you are taking a blood thinner (such as warfarin), allopurinol, or probenecid. Warnings While Using This Medicine: · Tell your doctor if you are pregnant or breastfeeding, or if you have kidney disease, liver disease, or mononucleosis (mono). · Birth control pills may not work as well while you are taking this medicine. Use another form of birth control to prevent pregnancy. · This medicine can cause diarrhea. Call your doctor if the diarrhea becomes severe, does not stop, or is bloody. Do not take any medicine to stop diarrhea until you have talked to your doctor. Diarrhea can occur 2 months or more after you stop taking this medicine. · Tell any doctor or dentist who treats you that you are using this medicine. This medicine may affect certain medical test results. · Call your doctor if your symptoms do not improve or if they get worse. · The chewable tablet and oral liquid contain phenylalanine. Talk to your doctor before you use this medicine if you have phenylketonuria (PKU). · Keep all medicine out of the reach of children. Never share your medicine with anyone. Possible Side Effects While Using This Medicine:  
Call your doctor right away if you notice any of these side effects: · Allergic reaction: Itching or hives, swelling in your face or hands, swelling or tingling in your mouth or throat, chest tightness, trouble breathing · Blistering, peeling, red skin rash · Change in how much or how often you urinate · Dark urine or pale stools, nausea, vomiting, loss of appetite, stomach pain, yellow eyes or skin · Diarrhea that may contain blood, stomach cramps If you notice these less serious side effects, talk with your doctor: · Diaper rash · Mild diarrhea, nausea, vomiting · Tooth discoloration (in children) If you notice other side effects that you think are caused by this medicine, tell your doctor. Call your doctor for medical advice about side effects. You may report side effects to FDA at 4-073-CFY-5413 © 2016 [address] is for End User's use only and may not be sold, redistributed or otherwise used for commercial purposes. The above information is an  only. It is not intended as medical advice for individual conditions or treatments. Talk to your doctor, nurse or pharmacist before following any medical regimen to see if it is safe and effective for you. Introducing [facility]! Dear Qiana Quintana: Thank you for requesting a mii account. Our records indicate that you already have an active mii account. You can access your account anytime at [email or web address] Did you know that you can access your hospital and ER discharge instructions at any time in mii? You can also review all of your test results from your hospital stay or ER visit. Additional Information If you have questions, please visit the Frequently Asked Questions section of the mii website at [email or web address] Remember, mii is NOT to be used for urgent needs. For medical emergencies, dial 911. Now available from your iPhone and Android! Please provide this summary of care documentation to your next provider. Your primary care clinician is listed as Richard Gunn. If you have any questions after today's visit, please call [phone].

## 2017-02-25 NOTE — PROGRESS NOTES
Subjective: (As above and below)     Chief Complaint   Patient presents with    Sinus Infection     she is a 42y.o. year old female who presents for evaluation. 2.5 weeks of sinus pain. Similar to sinus infections in past. Yellow nasal discharge. Has tried mucinex without resolution  Not improving and worsening over past 7 days. No fever or chills. No severe headache. Review of Systems - negative except as listed above    Reviewed PmHx, RxHx, FmHx, SocHx, AllgHx and updated in chart. Family History   Problem Relation Age of Onset    Hypertension Mother     Asthma Father        Past Medical History:   Diagnosis Date    Asthma     MS (multiple sclerosis) ([facility] [address]       Social History     Social History    Marital status:      Spouse name: N/A    Number of children: N/A    Years of education: N/A     Social History Main Topics    Smoking status: Never Smoker    Smokeless tobacco: Never Used    Alcohol use 1.2 oz/week     2 Glasses of wine per week      Comment: 2 drinks per week    Drug use: No    Sexual activity: Not Asked     Other Topics Concern    None     Social History Narrative          Current Outpatient Prescriptions   Medication Sig    amoxicillin-clavulanate (AUGMENTIN) 875-125 mg per tablet Take 1 Tab by mouth two (2) times a day for 7 days.  fluticasone (FLONASE) 50 mcg/actuation nasal spray 2 sprays each nostril one time daily for 30 days.  docusate sodium (COLACE) 50 mg capsule Take 1 Cap by mouth two (2) times a day for 90 days.  albuterol (PROVENTIL HFA, VENTOLIN HFA, PROAIR HFA) 90 mcg/actuation inhaler Take 2 Puffs by inhalation every four (4) hours as needed.  GLATIRAMER ACETATE (COPAXONE SC) 1 Dose by SubCUTAneous route daily. No current facility-administered medications for this visit.         Objective:     Vitals:    02/25/17 0853   BP: 107/71   Pulse: 77   Resp: 17   Temp: 98.5 °F (36.9 °C)   TempSrc: Oral   Weight: 153 lb (69.4 kg)   Height: 5' 6\" (1.676 m)     Physical Examination: General appearance - alert, well appearing, and in no distress  Eyes - pupils equal and reactive, extraocular eye movements intact, sclera anicteric  Ears - bilateral TM's and external ear canals normal  Nose - Pale swollen nasal turbinates bilaterally. No discharge. Mouth - Post nasal drip present. No swelling, exudates or lesions. Chest - clear to auscultation, no wheezes, rales or rhonchi, symmetric air entry  Heart - normal rate, regular rhythm, normal S1, S2, no murmurs, rubs, clicks or gallops  Neurological - cranial nerves II through XII intact      Assessment/ Plan:       ICD-10-CM ICD-9-CM    1. Acute bacterial rhinosinusitis J01.90 461.9 amoxicillin-clavulanate (AUGMENTIN) 875-125 mg per tablet    B96.89     2. Seasonal allergic rhinitis, unspecified allergic rhinitis trigger J30.2 477.9 fluticasone (FLONASE) 50 mcg/actuation nasal spray        1. Acute bacterial rhinosinusitis  > 2 weeks of sinus pain and yellow nasal discharge. Will Rx antibiotic.  - amoxicillin-clavulanate (AUGMENTIN) 875-125 mg per tablet; Take 1 Tab by mouth two (2) times a day for 7 days. Dispense: 14 Tab; Refill: 0   -Take with food and pro-biotic. 2. Seasonal allergic rhinitis, unspecified allergic rhinitis trigger  - fluticasone (FLONASE) 50 mcg/actuation nasal spray; 2 sprays each nostril one time daily for 30 days. Dispense: 1 Bottle; Refill: 3      Follow up in office without at least some relief on antibiotic in next 4 days. Sooner for worsening or new symptoms. I have discussed the diagnosis with the patient and the intended plan as seen in the above orders. The patient has received an after-visit summary and questions were answered concerning future plans.      Medication Side Effects and Warnings were discussed with patient: yes  Patient Past Records were reviewed:  yes    Enrique Barrett NP

## 2017-02-25 NOTE — PATIENT INSTRUCTIONS
Sinusitis: Care Instructions  Your Care Instructions    Sinusitis is an infection of the lining of the sinus cavities in your head. Sinusitis often follows a cold. It causes pain and pressure in your head and face. In most cases, sinusitis gets better on its own in 1 to 2 weeks. But some mild symptoms may last for several weeks. Sometimes antibiotics are needed. Follow-up care is a key part of your treatment and safety. Be sure to make and go to all appointments, and call your doctor if you are having problems. It's also a good idea to know your test results and keep a list of the medicines you take. How can you care for yourself at home? · Take an over-the-counter pain medicine, such as acetaminophen (Tylenol), ibuprofen (Advil, Motrin), or naproxen (Aleve). Read and follow all instructions on the label. · If the doctor prescribed antibiotics, take them as directed. Do not stop taking them just because you feel better. You need to take the full course of antibiotics. · Be careful when taking over-the-counter cold or flu medicines and Tylenol at the same time. Many of these medicines have acetaminophen, which is Tylenol. Read the labels to make sure that you are not taking more than the recommended dose. Too much acetaminophen (Tylenol) can be harmful. · Breathe warm, moist air from a steamy shower, a hot bath, or a sink filled with hot water. Avoid cold, dry air. Using a humidifier in your home may help. Follow the directions for cleaning the machine. · Use saline (saltwater) nasal washes to help keep your nasal passages open and wash out mucus and bacteria. You can buy saline nose drops at a grocery store or drugstore. Or you can make your own at home by adding 1 teaspoon of salt and 1 teaspoon of baking soda to 2 cups of distilled water. If you make your own, fill a bulb syringe with the solution, insert the tip into your nostril, and squeeze gently. Claudio Rolls your nose.   · Put a hot, wet towel or a warm gel pack on your face 3 or 4 times a day for 5 to 10 minutes each time. · Try a decongestant nasal spray like oxymetazoline (Afrin). Do not use it for more than 3 days in a row. Using it for more than 3 days can make your congestion worse. When should you call for help? Call your doctor now or seek immediate medical care if:  · You have new or worse swelling or redness in your face or around your eyes. · You have a new or higher fever. Watch closely for changes in your health, and be sure to contact your doctor if:  · You have new or worse facial pain. · The mucus from your nose becomes thicker (like pus) or has new blood in it. · You are not getting better as expected. Where can you learn more? Go to http://marv-uvaldo.info/. Enter Y080 in the search box to learn more about \"Sinusitis: Care Instructions. \"  Current as of: July 29, 2016  Content Version: 11.1  © 2006-2016 [employer]. Care instructions adapted under license by [employer] (which disclaims liability or warranty for this information). If you have questions about a medical condition or this instruction, always ask your healthcare professional. [place] [address] any warranty or liability for your use of this information. Amoxicillin/Clavulanate Potassium (By mouth)   Amoxicillin (l-enx-m-JOSEY-in), Clavulanate Potassium (GEFN-jg-wy-alec znp-XIK-qr-um)  Treats infections. This medicine is a penicillin antibiotic. Brand Name(s):Augmentin, Augmentin ES-600, Augmentin XR   There may be other brand names for this medicine. When This Medicine Should Not Be Used: This medicine is not right for everyone. Do not use it if you had an allergic reaction to amoxicillin, clavulanate, or a similar antibiotic (penicillin or cephalosporin), or if you had liver problems caused by Augmentin®.    How to Use This Medicine:   Liquid, Tablet, Chewable Tablet, Long Acting Tablet  · Your doctor will tell you how much medicine to use. Do not use more than directed. · Take this medicine with a snack or at the beginning of a meal to help prevent nausea. · Chewable tablets: Chew the tablet completely before you swallow it. · Measure the oral liquid medicine with a marked measuring spoon, oral syringe, or medicine cup. Shake the medicine well just before you measure each dose. Rinse the spoon or dropper after each use. · Swallow the extended-release tablet whole. Do not crush, break, or chew it. · Take all of the medicine in your prescription to clear up your infection, even if you feel better after the first few doses. · Missed dose: Take a dose as soon as you remember. If it is almost time for your next dose, wait until then and take a regular dose. Do not take extra medicine to make up for a missed dose. · Tablet, extended-release tablet, chewable tablet: Store at room temperature, away from heat, moisture, and direct light. · Oral liquid: Store in the refrigerator. Do not freeze. · Throw away any unused oral liquid after 10 days. Drugs and Foods to Avoid:   Ask your doctor or pharmacist before using any other medicine, including over-the-counter medicines, vitamins, and herbal products. · Some medicines can affect how this medicine works. Tell your doctor if you are taking a blood thinner (such as warfarin), allopurinol, or probenecid. Warnings While Using This Medicine:   · Tell your doctor if you are pregnant or breastfeeding, or if you have kidney disease, liver disease, or mononucleosis (mono). · Birth control pills may not work as well while you are taking this medicine. Use another form of birth control to prevent pregnancy. · This medicine can cause diarrhea. Call your doctor if the diarrhea becomes severe, does not stop, or is bloody. Do not take any medicine to stop diarrhea until you have talked to your doctor.  Diarrhea can occur 2 months or more after you stop taking this medicine. · Tell any doctor or dentist who treats you that you are using this medicine. This medicine may affect certain medical test results. · Call your doctor if your symptoms do not improve or if they get worse. · The chewable tablet and oral liquid contain phenylalanine. Talk to your doctor before you use this medicine if you have phenylketonuria (PKU). · Keep all medicine out of the reach of children. Never share your medicine with anyone. Possible Side Effects While Using This Medicine:   Call your doctor right away if you notice any of these side effects:  · Allergic reaction: Itching or hives, swelling in your face or hands, swelling or tingling in your mouth or throat, chest tightness, trouble breathing  · Blistering, peeling, red skin rash  · Change in how much or how often you urinate  · Dark urine or pale stools, nausea, vomiting, loss of appetite, stomach pain, yellow eyes or skin  · Diarrhea that may contain blood, stomach cramps  If you notice these less serious side effects, talk with your doctor:   · Diaper rash  · Mild diarrhea, nausea, vomiting  · Tooth discoloration (in children)  If you notice other side effects that you think are caused by this medicine, tell your doctor. Call your doctor for medical advice about side effects. You may report side effects to FDA at 5-780-FDA-9663  © 2016 [address] is for End User's use only and may not be sold, redistributed or otherwise used for commercial purposes. The above information is an  only. It is not intended as medical advice for individual conditions or treatments. Talk to your doctor, nurse or pharmacist before following any medical regimen to see if it is safe and effective for you.

## 2017-03-02 ENCOUNTER — CLINICAL SUPPORT (OUTPATIENT)
Dept: BARIATRICS/WEIGHT MGMT | Age: 47
End: 2017-03-02

## 2017-03-02 VITALS
DIASTOLIC BLOOD PRESSURE: 77 MMHG | SYSTOLIC BLOOD PRESSURE: 107 MMHG | HEIGHT: 66 IN | WEIGHT: 151.1 LBS | BODY MASS INDEX: 24.28 KG/M2

## 2017-03-02 DIAGNOSIS — E66.9 OBESITY, UNSPECIFIED: Primary | ICD-10-CM

## 2017-03-02 NOTE — PROGRESS NOTES
Progress Note: Weekly Education Class in the [facility] Loss Program   Is there anything that you or the patient needs to let Dr. Chey Gerard know about? no  Over the past week, have you experienced any side-effects? no    Skylar Alejandra is a 42 y.o. female who is enrolled in [facility]    Visit Vitals    /77    Ht 5' 6\" (1.676 m)    Wt 151 lb 1.6 oz (68.5 kg)    LMP 02/23/2017    BMI 24.39 kg/m2     Weight Metrics 3/2/2017 2/25/2017 2/23/2017 2/14/2017 2/14/2017 2/8/2017 2/7/2017   Weight 151 lb 1.6 oz 153 lb 154 lb - 156 lb 14.4 oz - 159 lb   Neck Circ (inches) - - - 12 - - -   Waist Measure Inches 31.5 - 31 35.5 - 33 -   Exercise Mins/week - - - 180 - - -   Body Fat % - - - 30 - - -   BMI 24.39 kg/m2 24.69 kg/m2 24.86 kg/m2 - 25.32 kg/m2 - 25.28 kg/m2         Have you received any other medical care this week? no  If yes, where and for what? Have you had any change in your medications since your last visit? no  If yes what? Did you have any problems adhering to the program last week? no  If yes, please explain:       Eating Habits Over Last Week:  Did you take in 64 oz of non-caloric fluids? yes     Did you consume your 4 meal replacements each day? yes with baked salmon Sunday.       Physical Activity Over the Past Week:    Aerobic exercise: 0 min  Resistance exercise: 0 workouts / week

## 2017-03-02 NOTE — PROGRESS NOTES
Nurse note from patient's weekly VLCD / LCD / Maintenance class was reviewed. Pertinent medical concerns were:   none     BP Readings from Last 3 Encounters:   03/02/17 107/77   02/25/17 107/71   02/23/17 127/87       Weight Metrics 3/2/2017 2/25/2017 2/23/2017 2/14/2017 2/14/2017 2/8/2017 2/7/2017   Weight 151 lb 1.6 oz 153 lb 154 lb - 156 lb 14.4 oz - 159 lb   Neck Circ (inches) - - - 12 - - -   Waist Measure Inches 31.5 - 31 35.5 - 33 -   Exercise Mins/week - - - 180 - - -   Body Fat % - - - 30 - - -   BMI 24.39 kg/m2 24.69 kg/m2 24.86 kg/m2 - 25.32 kg/m2 - 25.28 kg/m2       Current Outpatient Prescriptions   Medication Sig Dispense Refill    fluticasone (FLONASE) 50 mcg/actuation nasal spray 2 sprays each nostril one time daily for 30 days. 1 Bottle 3    docusate sodium (COLACE) 50 mg capsule Take 1 Cap by mouth two (2) times a day for 90 days. 60 Cap 2    albuterol (PROVENTIL HFA, VENTOLIN HFA, PROAIR HFA) 90 mcg/actuation inhaler Take 2 Puffs by inhalation every four (4) hours as needed.  GLATIRAMER ACETATE (COPAXONE SC) 1 Dose by SubCUTAneous route daily.  amoxicillin-clavulanate (AUGMENTIN) 875-125 mg per tablet Take 1 Tab by mouth two (2) times a day for 7 days.  14 Tab 0

## 2017-03-09 ENCOUNTER — CLINICAL SUPPORT (OUTPATIENT)
Dept: BARIATRICS/WEIGHT MGMT | Age: 47
End: 2017-03-09

## 2017-03-09 VITALS
HEIGHT: 66 IN | BODY MASS INDEX: 24.2 KG/M2 | DIASTOLIC BLOOD PRESSURE: 74 MMHG | SYSTOLIC BLOOD PRESSURE: 113 MMHG | WEIGHT: 150.6 LBS

## 2017-03-09 DIAGNOSIS — E66.3 OVERWEIGHT (BMI 25.0-29.9): Primary | ICD-10-CM

## 2017-03-09 NOTE — PROGRESS NOTES
Nurse note from patient's weekly VLCD / LCD / Maintenance class was reviewed. Pertinent medical concerns were:     Doing well   BP Readings from Last 3 Encounters:   03/09/17 113/74   03/02/17 107/77   02/25/17 107/71       Weight Metrics 3/9/2017 3/2/2017 2/25/2017 2/23/2017 2/14/2017 2/14/2017 2/8/2017   Weight 150 lb 9.6 oz 151 lb 1.6 oz 153 lb 154 lb - 156 lb 14.4 oz -   Neck Circ (inches) - - - - 12 - -   Waist Measure Inches 30.5 31.5 - 31 35.5 - 33   Exercise Mins/week - - - - 180 - -   Body Fat % - - - - 30 - -   BMI 24.31 kg/m2 24.39 kg/m2 24.69 kg/m2 24.86 kg/m2 - 25.32 kg/m2 -       Current Outpatient Prescriptions   Medication Sig Dispense Refill    fluticasone (FLONASE) 50 mcg/actuation nasal spray 2 sprays each nostril one time daily for 30 days. 1 Bottle 3    docusate sodium (COLACE) 50 mg capsule Take 1 Cap by mouth two (2) times a day for 90 days. 60 Cap 2    albuterol (PROVENTIL HFA, VENTOLIN HFA, PROAIR HFA) 90 mcg/actuation inhaler Take 2 Puffs by inhalation every four (4) hours as needed.  GLATIRAMER ACETATE (COPAXONE SC) 1 Dose by SubCUTAneous route daily.      -24 lbs so far

## 2017-03-09 NOTE — PROGRESS NOTES
Progress Note: Weekly Education Class in the [facility] Weight Loss Program   Is there anything that you or the patient needs to let Dr. Miriam Griffiths know about? no  Over the past week, have you experienced any side-effects? no    Jose F Alcala is a 42 y.o. female who is enrolled in [facility] Loss Program    Visit Vitals    /74    Ht 5' 6\" (1.676 m)    Wt 150 lb 9.6 oz (68.3 kg)    LMP 02/23/2017    BMI 24.31 kg/m2     Weight Metrics 3/9/2017 3/2/2017 2/25/2017 2/23/2017 2/14/2017 2/14/2017 2/8/2017   Weight 150 lb 9.6 oz 151 lb 1.6 oz 153 lb 154 lb - 156 lb 14.4 oz -   Neck Circ (inches) - - - - 12 - -   Waist Measure Inches 30.5 31.5 - 31 35.5 - 33   Exercise Mins/week - - - - 180 - -   Body Fat % - - - - 30 - -   BMI 24.31 kg/m2 24.39 kg/m2 24.69 kg/m2 24.86 kg/m2 - 25.32 kg/m2 -         Have you received any other medical care this week? no  If yes, where and for what? Have you had any change in your medications since your last visit? no  If yes what? Did you have any problems adhering to the program last week? no  If yes, please explain:       Eating Habits Over Last Week:  Did you take in 64 oz of non-caloric fluids? yes     Did you consume your 4 meal replacements each day? yes with turkey on Sunday.       Physical Activity Over the Past Week:    Aerobic exercise: 0 min  Resistance exercise: 0 workouts / week

## 2017-03-16 ENCOUNTER — OFFICE VISIT (OUTPATIENT)
Dept: BARIATRICS/WEIGHT MGMT | Age: 47
End: 2017-03-16

## 2017-03-16 VITALS
WEIGHT: 143.7 LBS | SYSTOLIC BLOOD PRESSURE: 116 MMHG | HEIGHT: 66 IN | BODY MASS INDEX: 23.09 KG/M2 | DIASTOLIC BLOOD PRESSURE: 77 MMHG

## 2017-03-16 DIAGNOSIS — E66.9 OBESITY, UNSPECIFIED: Primary | ICD-10-CM

## 2017-03-16 NOTE — PROGRESS NOTES
Nurse note from patient's weekly VLCD / LCD / Maintenance class was reviewed. Pertinent medical concerns were:   none  Goal 135 starting weight 175 current 143  ( -32 lbs)   BP Readings from Last 3 Encounters:   03/16/17 116/77   03/09/17 113/74   03/02/17 107/77       Weight Metrics 3/16/2017 3/9/2017 3/2/2017 2/25/2017 2/23/2017 2/14/2017 2/14/2017   Weight 143 lb 11.2 oz 150 lb 9.6 oz 151 lb 1.6 oz 153 lb 154 lb - 156 lb 14.4 oz   Neck Circ (inches) - - - - - 12 -   Waist Measure Inches 31 30.5 31.5 - 31 35.5 -   Exercise Mins/week - - - - - 180 -   Body Fat % - - - - - 30 -   BMI 23.19 kg/m2 24.31 kg/m2 24.39 kg/m2 24.69 kg/m2 24.86 kg/m2 - 25.32 kg/m2       Current Outpatient Prescriptions   Medication Sig Dispense Refill    fluticasone (FLONASE) 50 mcg/actuation nasal spray 2 sprays each nostril one time daily for 30 days. 1 Bottle 3    docusate sodium (COLACE) 50 mg capsule Take 1 Cap by mouth two (2) times a day for 90 days. 60 Cap 2    albuterol (PROVENTIL HFA, VENTOLIN HFA, PROAIR HFA) 90 mcg/actuation inhaler Take 2 Puffs by inhalation every four (4) hours as needed.  GLATIRAMER ACETATE (COPAXONE SC) 1 Dose by SubCUTAneous route daily. Must start exercise at least 3 days a week as a preparation for transition to maintenance.  Exercise is vital to maintain the weight loss

## 2017-03-16 NOTE — PROGRESS NOTES
Progress Note: Weekly Education Class in the [facility] Weight Loss Program   Is there anything that you or the patient needs to let Dr Keely Boucher know about? no  Over the past week, have you experienced any side-effects? no    Greyson Allan is a 42 y.o. female who is enrolled in [facility] Weight Loss Program    Visit Vitals    /77    Ht 5' 6\" (1.676 m)    Wt 143 lb 11.2 oz (65.2 kg)    LMP 02/23/2017    BMI 23.19 kg/m2     Weight Metrics 3/16/2017 3/9/2017 3/2/2017 2/25/2017 2/23/2017 2/14/2017 2/14/2017   Weight 143 lb 11.2 oz 150 lb 9.6 oz 151 lb 1.6 oz 153 lb 154 lb - 156 lb 14.4 oz   Neck Circ (inches) - - - - - 12 -   Waist Measure Inches 31 30.5 31.5 - 31 35.5 -   Exercise Mins/week - - - - - 180 -   Body Fat % - - - - - 30 -   BMI 23.19 kg/m2 24.31 kg/m2 24.39 kg/m2 24.69 kg/m2 24.86 kg/m2 - 25.32 kg/m2         Have you received any other medical care this week? no  If yes, where and for what? Have you had any change in your medications since your last visit? no  If yes what? Did you have any problems adhering to the program last week? no  If yes, please explain:       Eating Habits Over Last Week:  Did you take in 64 oz of non-caloric fluids? yes     Did you consume your 4 meal replacements each day?  yes       Physical Activity Over the Past Week:    Aerobic exercise: 0 min  Resistance exercise: 0 workouts / week

## 2017-04-12 ENCOUNTER — OFFICE VISIT (OUTPATIENT)
Dept: FAMILY MEDICINE CLINIC | Facility: CLINIC | Age: 47
End: 2017-04-12

## 2017-04-12 VITALS
RESPIRATION RATE: 16 BRPM | HEART RATE: 73 BPM | SYSTOLIC BLOOD PRESSURE: 100 MMHG | HEIGHT: 66 IN | DIASTOLIC BLOOD PRESSURE: 63 MMHG | TEMPERATURE: 97.8 F | BODY MASS INDEX: 23.3 KG/M2 | WEIGHT: 145 LBS

## 2017-04-12 DIAGNOSIS — H10.32 ACUTE BACTERIAL CONJUNCTIVITIS OF LEFT EYE: Primary | ICD-10-CM

## 2017-04-12 RX ORDER — POLYMYXIN B SULFATE AND TRIMETHOPRIM 1; 10000 MG/ML; [USP'U]/ML
1 SOLUTION OPHTHALMIC
Qty: 1 BOTTLE | Refills: 0 | Status: SHIPPED | OUTPATIENT
Start: 2017-04-12 | End: 2017-04-19

## 2017-04-12 NOTE — MR AVS SNAPSHOT
Visit Information Date & Time Provider Department Dept. Phone Encounter #  
 4/12/2017 10:00 AM Patel Bernardo, [address] [phone] [id number] Upcoming Health Maintenance Date Due Pneumococcal 19-64 Medium Risk (1 of 1 - PPSV23) 8/13/1991 DTaP/Tdap/Td series (1 - Tdap) 8/13/1993 PAP AKA CERVICAL CYTOLOGY 8/13/1993 INFLUENZA AGE 9 TO ADULT 8/1/2016 Allergies as of 4/12/2017  Review Complete On: 4/12/2017 By: Patel Chang NP No Known Allergies Current Immunizations  Never Reviewed No immunizations on file. Not reviewed this visit You Were Diagnosed With   
  
 Codes Comments Acute bacterial conjunctivitis of left eye    -  Primary ICD-10-CM: H10.32 
ICD-9-CM: 372.03 Vitals BP Pulse Temp Resp Height(growth percentile) Weight(growth percentile) 100/63 73 97.8 °F (36.6 °C) (Oral) 16 5' 6\" (1.676 m) 145 lb (65.8 kg) LMP BMI OB Status Smoking Status 03/28/2017 (Approximate) 23.4 kg/m2 Having regular periods Never Smoker Vitals History BMI and BSA Data Body Mass Index Body Surface Area  
 23.4 kg/m 2 1.75 m 2 Preferred Pharmacy Pharm[address] Name Phone [address] [place] [id number] [facility] [phone] Your Updated Medication List  
  
   
This list is accurate as of: 4/12/17 10:12 AM.  Always use your most recent med list.  
  
  
  
  
 albuterol 90 mcg/actuation inhaler Commonly known as:  PROVENTIL HFA, VENTOLIN HFA, PROAIR HFA Take 2 Puffs by inhalation every four (4) hours as needed. COPAXONE SC  
1 Dose by SubCUTAneous route daily. docusate sodium 50 mg capsule Commonly known as:  Trevor Chihuahua Take 1 Cap by mouth two (2) times a day for 90 days. fluticasone 50 mcg/actuation nasal spray Commonly known as:  FLONASE  
2 sprays each nostril one time daily for 30 days. trimethoprim-polymyxin b ophthalmic solution Commonly known as:  POLYTRIM Administer 1 Drop to left eye five (5) times daily for 7 days. Prescriptions Sent to Pharmacy Refills  
 trimethoprim-polymyxin b (POLYTRIM) ophthalmic solution 0 Sig: Administer 1 Drop to left eye five (5) times daily for 7 days. Class: Normal  
 Pharmacy: [facility], [address] [place] [address] PKY  #: [phone] Route: Left Eye Patient Instructions Pinkeye: Care Instructions Your Care Instructions Pinkeye is redness and swelling of the eye surface and the conjunctiva (the lining of the eyelid and the covering of the white part of the eye). Pinkeye is also called conjunctivitis. Pinkeye is often caused by infection with bacteria or a virus. Dry air, allergies, smoke, and chemicals are other common causes. Pinkeye often clears on its own in 7 to 10 days. Antibiotics only help if the pinkeye is caused by bacteria. Pinkeye caused by infection spreads easily. If an allergy or chemical is causing pinkeye, it will not go away unless you can avoid whatever is causing it. Follow-up care is a key part of your treatment and safety. Be sure to make and go to all appointments, and call your doctor if you are having problems. Its also a good idea to know your test results and keep a list of the medicines you take. How can you care for yourself at home? · Wash your hands often. Always wash them before and after you treat pinkeye or touch your eyes or face. · Use moist cotton or a clean, wet cloth to remove crust. Wipe from the inside corner of the eye to the outside. Use a clean part of the cloth for each wipe. · Put cold or warm wet cloths on your eye a few times a day if the eye hurts. · Do not wear contact lenses or eye makeup until the pinkeye is gone. Throw away any eye makeup you were using when you got pinkeye. Clean your contacts and storage case.  If you wear disposable contacts, use a new pair when your eye has cleared and it is safe to wear contacts again. · If the doctor gave you antibiotic ointment or eyedrops, use them as directed. Use the medicine for as long as instructed, even if your eye starts looking better soon. Keep the bottle tip clean, and do not let it touch the eye area. · To put in eyedrops or ointment: ¨ Tilt your head back, and pull your lower eyelid down with one finger. ¨ Drop or squirt the medicine inside the lower lid. ¨ Close your eye for 30 to 60 seconds to let the drops or ointment move around. ¨ Do not touch the ointment or dropper tip to your eyelashes or any other surface. · Do not share towels, pillows, or washcloths while you have pinkeye. When should you call for help? Call your doctor now or seek immediate medical care if: 
· You have pain in your eye, not just irritation on the surface. · You have a change in vision or loss of vision. · You have an increase in discharge from the eye. · Your eye has not started to improve or begins to get worse within 48 hours after you start using antibiotics. · Pinkeye lasts longer than 7 days. Watch closely for changes in your health, and be sure to contact your doctor if you have any problems. Where can you learn more? Go to http://marv-uvaldo.info/. Enter Y392 in the search box to learn more about \"Pinkeye: Care Instructions. \" Current as of: May 27, 2016 Content Version: 11.2 © 9138-9892 [employer]. Care instructions adapted under license by [facility] (which disclaims liability or warranty for this information). If you have questions about a medical condition or this instruction, always ask your healthcare professional. [place] [address] any warranty or liability for your use of this information. Polymyxin B/Trimethoprim (Into the eye) Polymyxin B (naastasia-ee-MIX-in B), Trimethoprim (trye-METH-oh-prim) Treats eye infections. Brand Name(s):Polytrim There may be other brand names for this medicine. When This Medicine Should Not Be Used: This medicine is not right for everyone. Do not use it if you had an allergic reaction to polymyxin B or trimethoprim. How to Use This Medicine:  
Liquid · Use this medicine as directed. · Wash your hands with soap and water before and after using this medicine. · Lie down or tilt your head back. With your index finger, pull down the lower lid of your eye to form a pocket. · To use the eye drops: Hold the dropper close to your eye with the other hand. Drop the correct number of drops into the pocket made between your lower lid and eyeball. Gently close your eyes. Place your index finger over the inner corner of your eye for 1 minute. Do not rinse or wipe the dropper or allow it to touch anything, including your eye. Put the cap on the bottle right away. Keep the bottle upright when you are not using it. · Keep using this medicine for the full treatment time, even if your infection improves. Your infection might come back if you stop using the medicine too soon. · Missed dose: Use your medicine as soon as you remember. If it is almost time for your next dose, wait until then and use a regular dose. Do not use extra medicine to make up for a missed dose. · Store the medicine at room temperature, away from heat and direct light. Drugs and Foods to Avoid: Ask your doctor or pharmacist before using any other medicine, including over-the-counter medicines, vitamins, and herbal products. Warnings While Using This Medicine: · Tell your doctor if you are pregnant or breastfeeding. · Tell your doctor if you wear contact lenses. You may not be able to wear them while your infection is being treated. · Call your doctor if your symptoms do not improve or if they get worse. · Keep all medicine out of the reach of children. Never share your medicine with anyone. Possible Side Effects While Using This Medicine: Call your doctor right away if you notice any of these side effects: · Allergic reaction: Itching or hives, swelling in your face or hands, swelling or tingling in your mouth or throat, chest tightness, trouble breathing · Increased redness, burning, or pain in your eye · Swelling of your eyelid If you notice these less serious side effects, talk with your doctor: · Mild irritation in your eye when you use the drops If you notice other side effects that you think are caused by this medicine, tell your doctor. Call your doctor for medical advice about side effects. You may report side effects to FDA at 0-562-PNG-9588 © 2016 [address] is for End User's use only and may not be sold, redistributed or otherwise used for commercial purposes. The above information is an  only. It is not intended as medical advice for individual conditions or treatments. Talk to your doctor, nurse or pharmacist before following any medical regimen to see if it is safe and effective for you. Introducing [facility]! Dear Sonny Gunderson: Thank you for requesting a [employer] account. Our records indicate that you already have an active [employer] account. You can access your account anytime at [email or web address] Did you know that you can access your hospital and ER discharge instructions at any time in [employer]? You can also review all of your test results from your hospital stay or ER visit. Additional Information If you have questions, please visit the Frequently Asked Questions section of the [employer] website at [email or web address] Remember, [employer] is NOT to be used for urgent needs. For medical emergencies, dial 911. Now available from your iPhone and Android! Please provide this summary of care documentation to your next provider. Your primary care clinician is listed as Ramandeep Padilla.  If you have any questions after today's visit, please call [phone].

## 2017-04-12 NOTE — PATIENT INSTRUCTIONS
Pinkeye: Care Instructions  Your Care Instructions    Pinkeye is redness and swelling of the eye surface and the conjunctiva (the lining of the eyelid and the covering of the white part of the eye). Pinkeye is also called conjunctivitis. Pinkeye is often caused by infection with bacteria or a virus. Dry air, allergies, smoke, and chemicals are other common causes. Pinkeye often clears on its own in 7 to 10 days. Antibiotics only help if the pinkeye is caused by bacteria. Pinkeye caused by infection spreads easily. If an allergy or chemical is causing pinkeye, it will not go away unless you can avoid whatever is causing it. Follow-up care is a key part of your treatment and safety. Be sure to make and go to all appointments, and call your doctor if you are having problems. Its also a good idea to know your test results and keep a list of the medicines you take. How can you care for yourself at home? · Wash your hands often. Always wash them before and after you treat pinkeye or touch your eyes or face. · Use moist cotton or a clean, wet cloth to remove crust. Wipe from the inside corner of the eye to the outside. Use a clean part of the cloth for each wipe. · Put cold or warm wet cloths on your eye a few times a day if the eye hurts. · Do not wear contact lenses or eye makeup until the pinkeye is gone. Throw away any eye makeup you were using when you got pinkeye. Clean your contacts and storage case. If you wear disposable contacts, use a new pair when your eye has cleared and it is safe to wear contacts again. · If the doctor gave you antibiotic ointment or eyedrops, use them as directed. Use the medicine for as long as instructed, even if your eye starts looking better soon. Keep the bottle tip clean, and do not let it touch the eye area. · To put in eyedrops or ointment:  ¨ Tilt your head back, and pull your lower eyelid down with one finger.   ¨ Drop or squirt the medicine inside the lower lid.  ¨ Close your eye for 30 to 60 seconds to let the drops or ointment move around. ¨ Do not touch the ointment or dropper tip to your eyelashes or any other surface. · Do not share towels, pillows, or washcloths while you have pinkeye. When should you call for help? Call your doctor now or seek immediate medical care if:  · You have pain in your eye, not just irritation on the surface. · You have a change in vision or loss of vision. · You have an increase in discharge from the eye. · Your eye has not started to improve or begins to get worse within 48 hours after you start using antibiotics. · Pinkeye lasts longer than 7 days. Watch closely for changes in your health, and be sure to contact your doctor if you have any problems. Where can you learn more? Go to http://marvRapidEnginesuvaldo.info/. Enter Y392 in the search box to learn more about \"Pinkeye: Care Instructions. \"  Current as of: May 27, 2016  Content Version: 11.2  © 5709-2109 [employer]. Care instructions adapted under license by [employer] (which disclaims liability or warranty for this information). If you have questions about a medical condition or this instruction, always ask your healthcare professional. [address] any warranty or liability for your use of this information. Polymyxin B/Trimethoprim (Into the eye)   Polymyxin B (anastasia-ee-MIX-in B), Trimethoprim (trye-METH-oh-prim)  Treats eye infections. Brand Name(s):Polytrim   There may be other brand names for this medicine. When This Medicine Should Not Be Used: This medicine is not right for everyone. Do not use it if you had an allergic reaction to polymyxin B or trimethoprim. How to Use This Medicine:   Liquid  · Use this medicine as directed. · Wash your hands with soap and water before and after using this medicine. · Lie down or tilt your head back.  With your index finger, pull down the lower lid of your eye to form a pocket. · To use the eye drops: Hold the dropper close to your eye with the other hand. Drop the correct number of drops into the pocket made between your lower lid and eyeball. Gently close your eyes. Place your index finger over the inner corner of your eye for 1 minute. Do not rinse or wipe the dropper or allow it to touch anything, including your eye. Put the cap on the bottle right away. Keep the bottle upright when you are not using it. · Keep using this medicine for the full treatment time, even if your infection improves. Your infection might come back if you stop using the medicine too soon. · Missed dose: Use your medicine as soon as you remember. If it is almost time for your next dose, wait until then and use a regular dose. Do not use extra medicine to make up for a missed dose. · Store the medicine at room temperature, away from heat and direct light. Drugs and Foods to Avoid:      Ask your doctor or pharmacist before using any other medicine, including over-the-counter medicines, vitamins, and herbal products. Warnings While Using This Medicine:   · Tell your doctor if you are pregnant or breastfeeding. · Tell your doctor if you wear contact lenses. You may not be able to wear them while your infection is being treated. · Call your doctor if your symptoms do not improve or if they get worse. · Keep all medicine out of the reach of children. Never share your medicine with anyone.   Possible Side Effects While Using This Medicine:   Call your doctor right away if you notice any of these side effects:  · Allergic reaction: Itching or hives, swelling in your face or hands, swelling or tingling in your mouth or throat, chest tightness, trouble breathing  · Increased redness, burning, or pain in your eye  · Swelling of your eyelid  If you notice these less serious side effects, talk with your doctor:   · Mild irritation in your eye when you use the drops  If you notice other side effects that you think are caused by this medicine, tell your doctor. Call your doctor for medical advice about side effects. You may report side effects to FDA at 9-810-IEI-6903  © 2016 [address] is for End User's use only and may not be sold, redistributed or otherwise used for commercial purposes. The above information is an  only. It is not intended as medical advice for individual conditions or treatments. Talk to your doctor, nurse or pharmacist before following any medical regimen to see if it is safe and effective for you.

## 2017-04-12 NOTE — PROGRESS NOTES
Subjective: (As above and below)     Chief Complaint   Patient presents with   [address]     she is a 42y.o. year old female who presents for evaluation. 2nd day of waking up with gooped closed crusty left eye. It is irritated and slighty itchy. Is not painful. No other URI/cold symptoms. Tried OTC redness relief without resolution. Denies: vision change  Review of Systems - negative except as listed above    Reviewed PmHx, RxHx, FmHx, SocHx, AllgHx and updated in chart. Family History   Problem Relation Age of Onset    Hypertension Mother     Asthma Father        Past Medical History:   Diagnosis Date    Asthma     MS (multiple sclerosis) ([facility] [address]       Social History     Social History    Marital status:      Spouse name: N/A    Number of children: N/A    Years of education: N/A     Social History Main Topics    Smoking status: Never Smoker    Smokeless tobacco: Never Used    Alcohol use 1.2 oz/week     2 Glasses of wine per week      Comment: 2 drinks per week    Drug use: No    Sexual activity: Not Asked     Other Topics Concern    None     Social History Narrative          Current Outpatient Prescriptions   Medication Sig    trimethoprim-polymyxin b (POLYTRIM) ophthalmic solution Administer 1 Drop to left eye five (5) times daily for 7 days.  fluticasone (FLONASE) 50 mcg/actuation nasal spray 2 sprays each nostril one time daily for 30 days.  docusate sodium (COLACE) 50 mg capsule Take 1 Cap by mouth two (2) times a day for 90 days.  albuterol (PROVENTIL HFA, VENTOLIN HFA, PROAIR HFA) 90 mcg/actuation inhaler Take 2 Puffs by inhalation every four (4) hours as needed.  GLATIRAMER ACETATE (COPAXONE SC) 1 Dose by SubCUTAneous route daily. No current facility-administered medications for this visit.         Objective:     Vitals:    04/12/17 1003   BP: 100/63   Pulse: 73   Resp: 16   Temp: 97.8 °F (36.6 °C)   TempSrc: Oral   Weight: 145 lb (65.8 kg)   Height: 5' 6\" (1.676 m)     Physical Examination:  Eyes - PERRLA. Minimal left conjunctival erythema. No discharge. Ears - bilateral TM's and external ear canals normal  Nose - normal and patent, no erythema, discharge or polyps  Mouth - mucous membranes moist, pharynx normal without lesions  Chest - clear to auscultation, no wheezes, rales or rhonchi, symmetric air entry  Heart - normal rate, regular rhythm, normal S1, S2, no murmurs, rubs, clicks or gallops      Assessment/ Plan:       ICD-10-CM ICD-9-CM    1. Acute bacterial conjunctivitis of left eye H10.32 372.03 trimethoprim-polymyxin b (POLYTRIM) ophthalmic solution      1. Acute bacterial conjunctivitis of left eye  - trimethoprim-polymyxin b (POLYTRIM) ophthalmic solution; Administer 1 Drop to left eye five (5) times daily for 7 days. Dispense: 1 Bottle; Refill: 0  -Moist compresses    Follow up without some relief in next 5 days. Immediate follow up required for new or worsening symptoms. I have discussed the diagnosis with the patient and the intended plan as seen in the above orders. The patient has received an after-visit summary and questions were answered concerning future plans.      Medication Side Effects, Adverse Effects, Risks, Benefits and Warnings and how to take medication properly were discussed with patient: yes  Patient Past Records were reviewed:  yes    Margarita Vicente NP

## 2017-05-16 ENCOUNTER — OFFICE VISIT (OUTPATIENT)
Dept: FAMILY MEDICINE CLINIC | Facility: CLINIC | Age: 47
End: 2017-05-16

## 2017-05-16 VITALS
BODY MASS INDEX: 23.89 KG/M2 | DIASTOLIC BLOOD PRESSURE: 78 MMHG | TEMPERATURE: 98.5 F | WEIGHT: 148 LBS | SYSTOLIC BLOOD PRESSURE: 120 MMHG | RESPIRATION RATE: 18 BRPM | HEART RATE: 82 BPM
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
## 2017-05-16 DIAGNOSIS — S90.562A INSECT BITE OF ANKLE, INFECTED, LEFT, INITIAL ENCOUNTER: Primary | ICD-10-CM

## 2017-05-16 DIAGNOSIS — L08.9 INSECT BITE OF ANKLE, INFECTED, LEFT, INITIAL ENCOUNTER: Primary | ICD-10-CM

## 2017-05-16 RX ORDER — SULFAMETHOXAZOLE AND TRIMETHOPRIM 800; 160 MG/1; MG/1
1 TABLET ORAL 2 TIMES DAILY
Qty: 14 TAB | Refills: 0 | Status: SHIPPED | OUTPATIENT
Start: 2017-05-16 | End: 2017-05-23

## 2017-05-16 RX ORDER — PREDNISONE 10 MG/1
TABLET ORAL
Qty: 9 TAB | Refills: 0 | Status: SHIPPED | OUTPATIENT
Start: 2017-05-16 | End: 2017-12-08 | Stop reason: CLARIF

## 2017-05-16 NOTE — PATIENT INSTRUCTIONS
Insect Stings and Bites: Care Instructions  Your Care Instructions  Stings and bites from bees, wasps, ants, and other insects often cause pain, swelling, redness, and itching. In some people, especially children, the redness and swelling may be worse. It may extend several inches beyond the affected area. But in most cases, stings and bites don't cause reactions all over the body. If you have had a reaction to an insect sting or bite, you are at risk for a reaction if you get stung or bitten again. Follow-up care is a key part of your treatment and safety. Be sure to make and go to all appointments, and call your doctor if you are having problems. It's also a good idea to know your test results and keep a list of the medicines you take. How can you care for yourself at home? · Do not scratch or rub the skin where the sting or bite occurred. · Put a cold pack or ice cube on the area. Put a thin cloth between the ice and your skin. For some people, a paste of baking soda mixed with a little water helps relieve pain and decrease the reaction. · Take an over-the-counter antihistamine, such as diphenhydramine (Benadryl) or loratadine (Claritin), to relieve swelling, redness, and itching. Calamine lotion or hydrocortisone cream may also help. Do not give antihistamines to your child unless you have checked with the doctor first.  · Be safe with medicines. If your doctor prescribed medicine for your allergy, take it exactly as prescribed. Call your doctor if you think you are having a problem with your medicine. You will get more details on the specific medicines your doctor prescribes. · Your doctor may prescribe a shot of epinephrine to carry with you in case you have a severe reaction. Learn how and when to give yourself the shot, and keep it with you at all times. Make sure it has not . · Go to the emergency room anytime you have a severe reaction.  Go even if you have given yourself epinephrine and are feeling better. Symptoms can come back. When should you call for help? Call 911 anytime you think you may need emergency care. For example, call if:  · You have symptoms of a severe allergic reaction. These may include:  ¨ Sudden raised, red areas (hives) all over your body. ¨ Swelling of the throat, mouth, lips, or tongue. ¨ Trouble breathing. ¨ Passing out (losing consciousness). Or you may feel very lightheaded or suddenly feel weak, confused, or restless. Call your doctor now or seek immediate medical care if:  · You have symptoms of an allergic reaction not right at the sting or bite, such as:  ¨ A rash or small area of hives (raised, red areas on the skin). ¨ Itching. ¨ Swelling. ¨ Belly pain, nausea, or vomiting. · You have a lot of swelling around the site (such as your entire arm or leg is swollen). · You have signs of infection, such as:  ¨ Increased pain, swelling, redness, or warmth around the sting. ¨ Red streaks leading from the area. ¨ Pus draining from the sting. ¨ A fever. Watch closely for changes in your health, and be sure to contact your doctor if:  · You do not get better as expected. Where can you learn more? Go to http://marv-uvaldo.info/. Enter P390 in the search box to learn more about \"Insect Stings and Bites: Care Instructions. \"  Current as of: July 28, 2016  Content Version: 11.2  © 5326-1512 [facility]. Care instructions adapted under license by [employer] (which disclaims liability or warranty for this information). If you have questions about a medical condition or this instruction, always ask your healthcare professional. [place] [address] any warranty or liability for your use of this information. Sulfamethoxazole/Trimethoprim (By mouth)   Sulfamethoxazole (sul-fa-meth-OX-a-zole), Trimethoprim (trye-METH-oh-prim)  Treats or prevents infections. Brand Name(s):  Bactrim, Bactrim DS, SMZ-TMP Pediatric, Sulfatrim, Sulfatrim Pediatric   There may be other brand names for this medicine. When This Medicine Should Not Be Used: This medicine is not right for everyone. Do not use it if you had an allergic reaction to trimethoprim, sulfamethoxazole, or any sulfa drug. Do not use this medicine if you are pregnant, if you have anemia caused by low levels of folic acid, or if you have a history of drug-induced thrombocytopenia. How to Use This Medicine:   Liquid, Tablet  · Your doctor will tell you how much medicine to use. Do not use more than directed. · Measure the oral liquid medicine with a marked measuring spoon, oral syringe, or medicine cup. · Drink extra fluids so you will urinate more often and help prevent kidney problems. · Take all of the medicine in your prescription to clear up your infection, even if you feel better after the first few doses. · Missed dose: Take a dose as soon as you remember. If it is almost time for your next dose, wait until then and take a regular dose. Do not take extra medicine to make up for a missed dose. · Store the medicine in a closed container at room temperature, away from heat, moisture, and direct light. Do not freeze the oral liquid. Drugs and Foods to Avoid:   Ask your doctor or pharmacist before using any other medicine, including over-the-counter medicines, vitamins, and herbal products. · Some medicines can affect how this medicine works. Tell your doctor if you also use the following:   ¨ amantadine, cyclosporine, digoxin, indomethacin, memantine, methotrexate, phenytoin, pyrimethamine, or warfarin  ¨ an ACE inhibitor, diabetes medicine (glipizide, glyburide, metformin, pioglitazone, repaglinide, rosiglitazone), a diuretic (water pill, such as hydrochlorothiazide), or a tricyclic antidepressant  Warnings While Using This Medicine:   · It is not safe to take this medicine during pregnancy. It could harm an unborn baby.  Tell your doctor right away if you become pregnant. · Tell your doctor if you are breastfeeding, or if you have kidney disease, liver disease, diabetes, malabsorption or malnutrition, folate deficiency, porphyria, thyroid problems, or a history of alcoholism. Tell your doctor if you have asthma or severe allergies, especially if you are allergic to any medicines. It is important for your doctor to know if you have HIV or AIDS, because this medicine might work differently for you. · This medicine may cause a severe allergic reaction. · This medicine may lower the number of platelets in your body, which are necessary for proper blood clotting. This may cause you to bleed or get infections more easily. Talk with your doctor if you have concerns about this. · This medicine can cause diarrhea. Call your doctor if the diarrhea becomes severe, does not stop, or is bloody. Do not take any medicine to stop diarrhea until you have talked to your doctor. Diarrhea can occur 2 months or more after you stop taking this medicine. · Tell any doctor or dentist who treats you that you are using this medicine. This medicine may affect certain medical test results. · Your doctor will do lab tests at regular visits to check on the effects of this medicine. Keep all appointments. · Keep all medicine out of the reach of children. Never share your medicine with anyone.   Possible Side Effects While Using This Medicine:   Call your doctor right away if you notice any of these side effects:  · Allergic reaction: Itching or hives, swelling in your face or hands, swelling or tingling in your mouth or throat, chest tightness, trouble breathing  · Blistering, peeling, or red skin rash  · Dark urine or pale stools, nausea, vomiting, loss of appetite, stomach pain, yellow skin or eyes  · Chest pain, cough, or trouble breathing  · Confusion, weakness  · Muscle twitching  · Severe diarrhea, stomach pain, cramps, bloating  · Skin rash, purple spots on your skin, or very pale or yellow skin  · Sore throat, fever, muscle pain  · Uneven heartbeat, numbness or tingling in your hands, feet, or lips  · Unusual bleeding, bruising, or weakness  If you notice these less serious side effects, talk with your doctor:   · Mild nausea, vomiting, or loss of appetite  If you notice other side effects that you think are caused by this medicine, tell your doctor. Call your doctor for medical advice about side effects. You may report side effects to FDA at 2-143-PQF-0080  © 2017 [facility] Information is for End User's use only and may not be sold, redistributed or otherwise used for commercial purposes. The above information is an  only. It is not intended as medical advice for individual conditions or treatments. Talk to your doctor, nurse or pharmacist before following any medical regimen to see if it is safe and effective for you. Oral Corticosteroids: Care Instructions  Your Care Instructions  Oral corticosteroids are commonly used medicines. They help calm down the body's response to inflammation. Oral means that they are taken by mouth. This is most often in the form of a pill. They are used for treating many conditions. You may take them for asthma, COPD, back pain, or allergic reactions. They are also used for other conditions such as autoimmune diseases and certain types of cancer. You may have side effects from taking this medicine. These include nausea, headache, dizziness, and anxiety. Pregnant women should not take this medicine unless their doctor tells them to. Follow your doctor's instructions on how to take this medicine. If you are taking it for 2 weeks or more, your doctor may give you special instructions to slowly reduce (taper) the amount you take. Slowly cutting down on the medicine over time helps your body adjust to the change. Follow-up care is a key part of your treatment and safety.  Be sure to make and go to all appointments, and call your doctor if you are having problems. It's also a good idea to know your test results and keep a list of the medicines you take. How can you care for yourself at home? · Be safe with medicines. Take your medicines exactly as prescribed. Call your doctor if you think you are having a problem with your medicine. You will get more details on the specific medicines your doctor prescribes. · Take your medicine after a meal. It may cause nausea if you take it on an empty stomach. · Avoid taking nonsteroidal anti-inflammatory drugs (NSAIDs) while you are taking oral corticosteroids. Taking both of these medicines might cause an upset stomach. NSAIDs include ibuprofen (Advil, Motrin) and naproxen (Aleve). · If you have a history of stomach ulcers, you may want to avoid taking this medicine and an NSAID at the same time. This can cause stomach upset or bleeding. · Follow your doctor's instructions for how to stop taking this medicine. You may need to taper it. This means the medicine should be slowly reduced. Do not stop taking the medicine all at once. When should you call for help? Call 911 if:  · You vomit blood or what looks like coffee grounds. Call your doctor now or seek immediate medical care if:  · Your symptoms are getting worse. · You are dizzy or lightheaded, or you feel like you may faint. · You have new or worse nausea or vomiting. · You have stomach pain that is getting worse. · Your stools are black. Watch closely for changes in your health, and be sure to contact your doctor if:  · You do not get better as expected. Where can you learn more? Go to http://marv-uvaldo.info/. Enter A384 in the search box to learn more about \"Oral Corticosteroids: Care Instructions. \"  Current as of: May 23, 2016  Content Version: 11.2  © 6289-9582 [employer].  Care instructions adapted under license by [employer] (which disclaims liability or warranty for this information). If you have questions about a medical condition or this instruction, always ask your healthcare professional. [place] [address] any warranty or liability for your use of this information.

## 2017-05-16 NOTE — PROGRESS NOTES
Subjective: (As above and below)     Chief Complaint   Patient presents with   [address]     she is a 42y.o. year old female who presents for evaluation. Here for bug bite on left ankle. Itchy spot on left lower ankle first noticed after sitting outside in yard. Has been scratching a lot. Today has gotten more swollen with redness. Still itchy but not as bad as day prior. Some discomfort but no quantifiable pain. PMH: does mention history of large local reactions to mosquito bites. Denies: fever/chills, abdominal pain, n/v, headache  Review of Systems - negative except as listed above    Reviewed PmHx, RxHx, FmHx, SocHx, AllgHx and updated in chart. Family History   Problem Relation Age of Onset    Hypertension Mother     Asthma Father        Past Medical History:   Diagnosis Date    Asthma     MS (multiple sclerosis) ([facility] [address]       Social History     Social History    Marital status:      Spouse name: N/A    Number of children: N/A    Years of education: N/A     Social History Main Topics    Smoking status: Never Smoker    Smokeless tobacco: Never Used    Alcohol use 1.2 oz/week     2 Glasses of wine per week      Comment: 2 drinks per week    Drug use: No    Sexual activity: Not Asked     Other Topics Concern    None     Social History Narrative          Current Outpatient Prescriptions   Medication Sig    predniSONE (DELTASONE) 10 mg tablet Take 4 tabs by mouth one time daily on day 1.   3 tabs on day 2.  2 tabs on day 3. Take with food.  trimethoprim-sulfamethoxazole (BACTRIM DS, SEPTRA DS) 160-800 mg per tablet Take 1 Tab by mouth two (2) times a day for 7 days.  fluticasone (FLONASE) 50 mcg/actuation nasal spray 2 sprays each nostril one time daily for 30 days.  albuterol (PROVENTIL HFA, VENTOLIN HFA, PROAIR HFA) 90 mcg/actuation inhaler Take 2 Puffs by inhalation every four (4) hours as needed.     GLATIRAMER ACETATE (COPAXONE SC) 1 Dose by SubCUTAneous route daily. No current facility-administered medications for this visit. Objective:     Vitals:    05/16/17 1608   BP: 120/78   Pulse: 82   Resp: 18   Temp: 98.5 °F (36.9 °C)   TempSrc: Oral   Weight: 148 lb (67.1 kg)     Physical Examination:   General appearance - alert, well appearing, and in no distress  Mental status - alert, oriented to person, place, and time, normal mood, behavior, speech, dress, motor activity, and thought processes  Lymphatics - no palpable lymphadenopathy  Chest - clear to auscultation, no wheezes, rales or rhonchi, symmetric air entry  Extremities - lower extremity peripheral pulses normal, good color. Skin - Approx 6cm  x6cm mildly erythematous circular area of swelling with centralized 2mm x 2mm excoriation. There is no discharge or streaking. Assessment/ Plan:       ICD-10-CM ICD-9-CM    1. Insect bite of ankle, infected, left, initial encounter S90.562A 916.5 predniSONE (DELTASONE) 10 mg tablet    L08.9 E906.4 trimethoprim-sulfamethoxazole (BACTRIM DS, SEPTRA DS) 160-800 mg per tablet    W57. XXXA          1. Insect bite of ankle, infected, left, initial encounter    Inflammation vs early infection. History of Large localized reaction to mosquito bites. No systemic signs/symptoms. Discussed treatment options with patient. Will cover with antibiotic.  3 day short taper oral corticosteroid to reduce inflammation.  -Cool compresses  -Monitor for worsening signs of infection which we have discussed    RX:  - predniSONE (DELTASONE) 10 mg tablet; Take 4 tabs by mouth one time daily on day 1.   3 tabs on day 2.  2 tabs on day 3. Take with food. Dispense: 9 Tab; Refill: 0  - trimethoprim-sulfamethoxazole (BACTRIM DS, SEPTRA DS) 160-800 mg per tablet; Take 1 Tab by mouth two (2) times a day for 7 days. Dispense: 14 Tab; Refill: 0    Follow up:  Immediate in office follow up for new or worsening symptoms or without some improvement in next 2-3 days.      I have discussed the diagnosis with the patient and the intended plan as seen in the above orders. The patient has received an after-visit summary and questions were answered concerning future plans.      Medication Side Effects, Adverse Effects, Risks, Benefits and Warnings and how to take medication properly were discussed with patient: yes  Patient Past Records were reviewed:  yes    Margarita Vicente NP

## 2017-05-16 NOTE — MR AVS SNAPSHOT
Visit Information Date & Time Provider Department Dept. Phone Encounter #  
 5/16/2017  4:00 PM Hugo Vinson, [address] [phone] [id number] Upcoming Health Maintenance Date Due Pneumococcal 19-64 Medium Risk (1 of 1 - PPSV23) 8/13/1991 DTaP/Tdap/Td series (1 - Tdap) 8/13/1993 PAP AKA CERVICAL CYTOLOGY 8/13/1993 INFLUENZA AGE 9 TO ADULT 8/1/2017 Allergies as of 5/16/2017  Review Complete On: 5/16/2017 By: Hugo Vinson NP No Known Allergies Current Immunizations  Never Reviewed No immunizations on file. Not reviewed this visit You Were Diagnosed With   
  
 Codes Comments Insect bite of ankle, infected, left, initial encounter    -  Primary ICD-10-CM: S90.562A, L08.9, W57. Pedro Nohemy ICD-9-CM: 916.5, E906.4 Vitals BP Pulse Temp Resp Weight(growth percentile) LMP  
 120/78 82 98.5 °F (36.9 °C) (Oral) 18 148 lb (67.1 kg) 05/12/2017 BMI OB Status Smoking Status 23.89 kg/m2 Having regular periods Never Smoker Vitals History BMI and BSA Data Body Mass Index Body Surface Area  
 23.89 kg/m 2 1.77 m 2 Preferred Pharmacy Pharmacy Name Phone [address] [id number] [facility] [phone] Your Updated Medication List  
  
   
This list is accurate as of: 5/16/17  4:18 PM.  Always use your most recent med list.  
  
  
  
  
 albuterol 90 mcg/actuation inhaler Commonly known as:  PROVENTIL HFA, VENTOLIN HFA, PROAIR HFA Take 2 Puffs by inhalation every four (4) hours as needed. COPAXONE SC  
1 Dose by SubCUTAneous route daily. fluticasone 50 mcg/actuation nasal spray Commonly known as:  FLONASE  
2 sprays each nostril one time daily for 30 days. predniSONE 10 mg tablet Commonly known as:  Lowella Clunes Take 4 tabs by mouth one time daily on day 1.   3 tabs on day 2.  2 tabs on day 3. Take with food. trimethoprim-sulfamethoxazole 160-800 mg per tablet Commonly known as:  BACTRIM DS, SEPTRA DS Take 1 Tab by mouth two (2) times a day for 7 days. Prescriptions Sent to Pharmacy Refills  
 predniSONE (DELTASONE) 10 mg tablet 0 Sig: Take 4 tabs by mouth one time daily on day 1.   3 tabs on day 2.  2 tabs on day 3. Take with food. Class: Normal  
 Pharmacy: [facility] [address] Ph #: [phone]  
 trimethoprim-sulfamethoxazole (BACTRIM DS, SEPTRA DS) 160-800 mg per tablet 0 Sig: Take 1 Tab by mouth two (2) times a day for 7 days. Class: Normal  
 Pharmacy: [facility] [address] Ph #: [phone] Route: Oral  
  
Patient Instructions Insect Stings and Bites: Care Instructions Your Care Instructions Stings and bites from bees, wasps, ants, and other insects often cause pain, swelling, redness, and itching. In some people, especially children, the redness and swelling may be worse. It may extend several inches beyond the affected area. But in most cases, stings and bites don't cause reactions all over the body. If you have had a reaction to an insect sting or bite, you are at risk for a reaction if you get stung or bitten again. Follow-up care is a key part of your treatment and safety. Be sure to make and go to all appointments, and call your doctor if you are having problems. It's also a good idea to know your test results and keep a list of the medicines you take. How can you care for yourself at home? · Do not scratch or rub the skin where the sting or bite occurred. · Put a cold pack or ice cube on the area. Put a thin cloth between the ice and your skin. For some people, a paste of baking soda mixed with a little water helps relieve pain and decrease the reaction.  
· Take an over-the-counter antihistamine, such as diphenhydramine (Benadryl) or loratadine (Claritin), to relieve swelling, redness, and itching. Calamine lotion or hydrocortisone cream may also help. Do not give antihistamines to your child unless you have checked with the doctor first. 
· Be safe with medicines. If your doctor prescribed medicine for your allergy, take it exactly as prescribed. Call your doctor if you think you are having a problem with your medicine. You will get more details on the specific medicines your doctor prescribes. · Your doctor may prescribe a shot of epinephrine to carry with you in case you have a severe reaction. Learn how and when to give yourself the shot, and keep it with you at all times. Make sure it has not . · Go to the emergency room anytime you have a severe reaction. Go even if you have given yourself epinephrine and are feeling better. Symptoms can come back. When should you call for help? Call 911 anytime you think you may need emergency care. For example, call if: 
· You have symptoms of a severe allergic reaction. These may include: 
¨ Sudden raised, red areas (hives) all over your body. ¨ Swelling of the throat, mouth, lips, or tongue. ¨ Trouble breathing. ¨ Passing out (losing consciousness). Or you may feel very lightheaded or suddenly feel weak, confused, or restless. Call your doctor now or seek immediate medical care if: 
· You have symptoms of an allergic reaction not right at the sting or bite, such as: ¨ A rash or small area of hives (raised, red areas on the skin). ¨ Itching. ¨ Swelling. ¨ Belly pain, nausea, or vomiting. · You have a lot of swelling around the site (such as your entire arm or leg is swollen). · You have signs of infection, such as: 
¨ Increased pain, swelling, redness, or warmth around the sting. ¨ Red streaks leading from the area. ¨ Pus draining from the sting. ¨ A fever. Watch closely for changes in your health, and be sure to contact your doctor if: · You do not get better as expected. Where can you learn more? Go to http://marv-uvaldo.info/. Enter P390 in the search box to learn more about \"Insect Stings and Bites: Care Instructions. \" Current as of: July 28, 2016 Content Version: 11.2 © 2978-1654 [employer]. Care instructions adapted under license by [employer] (which disclaims liability or warranty for this information). If you have questions about a medical condition or this instruction, always ask your healthcare professional. [address] any warranty or liability for your use of this information. Sulfamethoxazole/Trimethoprim (By mouth) Sulfamethoxazole (sul-fa-meth-OX-a-zole), Trimethoprim (trye-METH-oh-prim) Treats or prevents infections. Brand Name(s): Bactrim, Bactrim DS, SMZ-TMP Pediatric, Sulfatrim, Sulfatrim Pediatric There may be other brand names for this medicine. When This Medicine Should Not Be Used: This medicine is not right for everyone. Do not use it if you had an allergic reaction to trimethoprim, sulfamethoxazole, or any sulfa drug. Do not use this medicine if you are pregnant, if you have anemia caused by low levels of folic acid, or if you have a history of drug-induced thrombocytopenia. How to Use This Medicine:  
Liquid, Tablet · Your doctor will tell you how much medicine to use. Do not use more than directed. · Measure the oral liquid medicine with a marked measuring spoon, oral syringe, or medicine cup. · Drink extra fluids so you will urinate more often and help prevent kidney problems. · Take all of the medicine in your prescription to clear up your infection, even if you feel better after the first few doses. · Missed dose: Take a dose as soon as you remember. If it is almost time for your next dose, wait until then and take a regular dose. Do not take extra medicine to make up for a missed dose. · Store the medicine in a closed container at room temperature, away from heat, moisture, and direct light. Do not freeze the oral liquid. Drugs and Foods to Avoid: Ask your doctor or pharmacist before using any other medicine, including over-the-counter medicines, vitamins, and herbal products. · Some medicines can affect how this medicine works. Tell your doctor if you also use the following:  
¨ amantadine, cyclosporine, digoxin, indomethacin, memantine, methotrexate, phenytoin, pyrimethamine, or warfarin ¨ an ACE inhibitor, diabetes medicine (glipizide, glyburide, metformin, pioglitazone, repaglinide, rosiglitazone), a diuretic (water pill, such as hydrochlorothiazide), or a tricyclic antidepressant Warnings While Using This Medicine: · It is not safe to take this medicine during pregnancy. It could harm an unborn baby. Tell your doctor right away if you become pregnant. · Tell your doctor if you are breastfeeding, or if you have kidney disease, liver disease, diabetes, malabsorption or malnutrition, folate deficiency, porphyria, thyroid problems, or a history of alcoholism. Tell your doctor if you have asthma or severe allergies, especially if you are allergic to any medicines. It is important for your doctor to know if you have HIV or AIDS, because this medicine might work differently for you. · This medicine may cause a severe allergic reaction. · This medicine may lower the number of platelets in your body, which are necessary for proper blood clotting. This may cause you to bleed or get infections more easily. Talk with your doctor if you have concerns about this. · This medicine can cause diarrhea. Call your doctor if the diarrhea becomes severe, does not stop, or is bloody. Do not take any medicine to stop diarrhea until you have talked to your doctor. Diarrhea can occur 2 months or more after you stop taking this medicine.  
· Tell any doctor or dentist who treats you that you are using this medicine. This medicine may affect certain medical test results. · Your doctor will do lab tests at regular visits to check on the effects of this medicine. Keep all appointments. · Keep all medicine out of the reach of children. Never share your medicine with anyone. Possible Side Effects While Using This Medicine:  
Call your doctor right away if you notice any of these side effects: · Allergic reaction: Itching or hives, swelling in your face or hands, swelling or tingling in your mouth or throat, chest tightness, trouble breathing · Blistering, peeling, or red skin rash · Dark urine or pale stools, nausea, vomiting, loss of appetite, stomach pain, yellow skin or eyes · Chest pain, cough, or trouble breathing · Confusion, weakness · Muscle twitching · Severe diarrhea, stomach pain, cramps, bloating · Skin rash, purple spots on your skin, or very pale or yellow skin · Sore throat, fever, muscle pain · Uneven heartbeat, numbness or tingling in your hands, feet, or lips · Unusual bleeding, bruising, or weakness If you notice these less serious side effects, talk with your doctor: · Mild nausea, vomiting, or loss of appetite If you notice other side effects that you think are caused by this medicine, tell your doctor. Call your doctor for medical advice about side effects. You may report side effects to FDA at 6-597-FDA-0517 © 2017 [address] Information is for End User's use only and may not be sold, redistributed or otherwise used for commercial purposes. The above information is an  only. It is not intended as medical advice for individual conditions or treatments. Talk to your doctor, nurse or pharmacist before following any medical regimen to see if it is safe and effective for you. Oral Corticosteroids: Care Instructions Your Care Instructions Oral corticosteroids are commonly used medicines.  They help calm down the body's response to inflammation. Oral means that they are taken by mouth. This is most often in the form of a pill. They are used for treating many conditions. You may take them for asthma, COPD, back pain, or allergic reactions. They are also used for other conditions such as autoimmune diseases and certain types of cancer. You may have side effects from taking this medicine. These include nausea, headache, dizziness, and anxiety. Pregnant women should not take this medicine unless their doctor tells them to. Follow your doctor's instructions on how to take this medicine. If you are taking it for 2 weeks or more, your doctor may give you special instructions to slowly reduce (taper) the amount you take. Slowly cutting down on the medicine over time helps your body adjust to the change. Follow-up care is a key part of your treatment and safety. Be sure to make and go to all appointments, and call your doctor if you are having problems. It's also a good idea to know your test results and keep a list of the medicines you take. How can you care for yourself at home? · Be safe with medicines. Take your medicines exactly as prescribed. Call your doctor if you think you are having a problem with your medicine. You will get more details on the specific medicines your doctor prescribes. · Take your medicine after a meal. It may cause nausea if you take it on an empty stomach. · Avoid taking nonsteroidal anti-inflammatory drugs (NSAIDs) while you are taking oral corticosteroids. Taking both of these medicines might cause an upset stomach. NSAIDs include ibuprofen (Advil, Motrin) and naproxen (Aleve). · If you have a history of stomach ulcers, you may want to avoid taking this medicine and an NSAID at the same time. This can cause stomach upset or bleeding. · Follow your doctor's instructions for how to stop taking this medicine. You may need to taper it.  This means the medicine should be slowly reduced. Do not stop taking the medicine all at once. When should you call for help? Call 911 if: 
· You vomit blood or what looks like coffee grounds. Call your doctor now or seek immediate medical care if: 
· Your symptoms are getting worse. · You are dizzy or lightheaded, or you feel like you may faint. · You have new or worse nausea or vomiting. · You have stomach pain that is getting worse. · Your stools are black. Watch closely for changes in your health, and be sure to contact your doctor if: 
· You do not get better as expected. Where can you learn more? Go to http://marv-uvaldo.info/. Enter H691 in the search box to learn more about \"Oral Corticosteroids: Care Instructions. \" Current as of: May 23, 2016 Content Version: 11.2 © 2866-4660 [employer]. Care instructions adapted under license by [employer] (which disclaims liability or warranty for this information). If you have questions about a medical condition or this instruction, always ask your healthcare professional. [place] [address] any warranty or liability for your use of this information. Introducing [facility]! Dear Cynthia Nava: Thank you for requesting a [employer] account. Our records indicate that you already have an active [employer] account. You can access your account anytime at [email or web address] Did you know that you can access your hospital and ER discharge instructions at any time in [employer]? You can also review all of your test results from your hospital stay or ER visit. Additional Information If you have questions, please visit the Frequently Asked Questions section of the [employer] website at [email or web address] Remember, [employer] is NOT to be used for urgent needs. For medical emergencies, dial 911. Now available from your iPhone and Android! Please provide this summary of care documentation to your next provider. Your primary care clinician is listed as Bobbi Arredondo. If you have any questions after today's visit, please call [phone].

## 2017-10-31 ENCOUNTER — OFFICE VISIT (OUTPATIENT)
Dept: NEUROLOGY | Age: 47
End: 2017-10-31

## 2017-10-31 VITALS
BODY MASS INDEX: 23.3 KG/M2 | HEART RATE: 74 BPM | RESPIRATION RATE: 12 BRPM | DIASTOLIC BLOOD PRESSURE: 82 MMHG | WEIGHT: 145 LBS | HEIGHT: 66 IN | OXYGEN SATURATION: 98 % | SYSTOLIC BLOOD PRESSURE: 124 MMHG

## 2017-10-31 DIAGNOSIS — G37.3 ACUTE TRANSVERSE MYELITIS IN DEMYELINATING DISEASE OF CENTRAL NERVOUS SYSTEM (HCC): ICD-10-CM

## 2017-10-31 DIAGNOSIS — E53.8 B12 DEFICIENCY: ICD-10-CM

## 2017-10-31 DIAGNOSIS — G35 MULTIPLE SCLEROSIS (HCC): Primary | ICD-10-CM

## 2017-10-31 DIAGNOSIS — E55.9 VITAMIN D DEFICIENCY: ICD-10-CM

## 2017-10-31 RX ORDER — CHOLECALCIFEROL (VITAMIN D3) 125 MCG
CAPSULE ORAL
COMMUNITY
End: 2018-09-13 | Stop reason: ALTCHOICE

## 2017-10-31 RX ORDER — HYDROGEN PEROXIDE 3 %
SOLUTION, NON-ORAL MISCELLANEOUS DAILY
COMMUNITY
End: 2018-09-13 | Stop reason: ALTCHOICE

## 2017-10-31 RX ORDER — METHYLPREDNISOLONE SODIUM SUCCINATE 1 G/16ML
1000 INJECTION, POWDER, LYOPHILIZED, FOR SOLUTION INTRAMUSCULAR; INTRAVENOUS DAILY
Qty: 3000 MG | Refills: 0 | Status: SHIPPED | OUTPATIENT
Start: 2017-10-31 | End: 2017-11-03

## 2017-10-31 RX ORDER — MULTIVIT WITH MINERALS/HERBS
1 TABLET ORAL DAILY
COMMUNITY
End: 2018-09-13 | Stop reason: ALTCHOICE

## 2017-10-31 RX ORDER — ESOMEPRAZOLE MAGNESIUM 40 MG/1
CAPSULE, DELAYED RELEASE ORAL DAILY
COMMUNITY
End: 2017-10-31

## 2017-10-31 NOTE — PATIENT INSTRUCTIONS

## 2017-10-31 NOTE — LETTER
10/31/2017 8:16 PM 
 
Patient:  Shelia Vásquez YOB: 1970 Date of Visit: 10/31/2017 Dear No Recipients: Thank you for referring Ms. Lynne Cervantes to me for evaluation/treatment. Below are the relevant portions of my assessment and plan of care. Consult REFERRED BY: 
Carole Melendrez MD 
 
CHIEF COMPLAINT: Numbness and tingling in her legs Subjective: Shelia Vásquez is a 41 y.o. right-handed -American female, seen as a new patient to us today for evaluation of increased numbness and tingling in her legs, that gets worse when she flexes her neck, at the referral of Dr. Ara Pozo. The patient was diagnosed with multiple sclerosis in 2001, I believe at the [facility], and was treated with Copaxone after a workup including MRIs and complete neurological evaluation. She had a relapse in 2008 that was treated with IV steroids but her Copaxone was maintained. She has not had any imaging of her brain since 2008. She has been lost to medical follow-up, and is probably taking old Copaxone injections that may not be effective. Her problem is that she has been moving frequently for the last few years. 3 or 4 days ago she had the sudden onset of the symptoms, and went to the patient [facility] near [facility], and was given 1 dose of IV Solu-Medrol and placed on an oral tapering dose of oral steroids. No other testing was done. She was referred to us for further evaluation. She has had no improvement in her symptoms in the interim. She has had no new other focal neurological symptoms. Her symptoms have not improved. She has not had much weakness in the legs, and her bowel and bladder function remain stable, she has not had any major neck pain or back pain other than the discomfort of the Lhermitte's sign. She has no symptoms in her arms at this time.   She has had no cranial nerve symptoms, except that she has had some blurred vision in her right eye has been persistent for several weeks. .  She had no unusual viruses, infections, drug exposure, toxin, injury, or any other cause for her symptoms. Her symptoms are very disabling to her at this time. She has had no other new medical problems, complications or illnesses. Past Medical History:  
Diagnosis Date  Asthma  MS (multiple sclerosis) ([facility] [address] Past Surgical History:  
Procedure Laterality Date  HX APPENDECTOMY  HX CHOLECYSTECTOMY  HX GYN    
 c/sec Family History Problem Relation Age of Onset  Hypertension Mother [address] [facility] Other Mother Hemoglobin C disorder  Asthma Father  Other Maternal Aunt Hemoglobin C disorder  Cancer Paternal Uncle   
  pancreatic  Heart Disease Paternal Uncle  Cancer Paternal Grandmother  Heart Disease Paternal Grandfather  Asthma Child Social History Substance Use Topics  Smoking status: Never Smoker  Smokeless tobacco: Never Used  Alcohol use 1.2 oz/week 2 Glasses of wine per week Comment: 4-5 drinks per week Current Outpatient Prescriptions:  
  esomeprazole (NEXIUM) 20 mg capsule, Take  by mouth daily. , Disp: , Rfl:  
  b complex vitamins (B COMPLEX 1) tablet, Take 1 Tab by mouth daily. , Disp: , Rfl:  
  cholecalciferol, vitamin D3, (VITAMIN D3) 2,000 unit tab, Take  by mouth., Disp: , Rfl:  
  methylPREDNISolone (SOLU-MEDROL) 1,000 mg injection, 1,000 mg by IntraVENous route daily for 3 days. , Disp: 3000 mg, Rfl: 0 
  predniSONE (DELTASONE) 10 mg tablet, Take 4 tabs by mouth one time daily on day 1.   3 tabs on day 2.  2 tabs on day 3. Take with food. , Disp: 9 Tab, Rfl: 0 
  fluticasone (FLONASE) 50 mcg/actuation nasal spray, 2 sprays each nostril one time daily for 30 days. , Disp: 1 Bottle, Rfl: 3 
  albuterol (PROVENTIL HFA, VENTOLIN HFA, PROAIR HFA) 90 mcg/actuation inhaler, Take 2 Puffs by inhalation every four (4) hours as needed. , Disp: , Rfl:  
  GLATIRAMER ACETATE (COPAXONE SC), 1 Dose by SubCUTAneous route daily. , Disp: , Rfl:  
No current facility-administered medications for this visit. Facility-Administered Medications Ordered in Other Visits:  
  [START ON 11/1/2017] methylPREDNISolone ((Solu-MEDROL) 1,000 mg in 0.9% sodium chloride (MBP/ADV) 100 mL, 1,000 mg, IntraVENous, ONCE, MD Nella Garcia ON 11/2/2017] methylPREDNISolone ((Solu-MEDROL) 1,000 mg in 0.9% sodium chloride (MBP/ADV) 100 mL, 1,000 mg, IntraVENous, ONCE, MD Nella Garcia ON 11/3/2017] methylPREDNISolone ((Solu-MEDROL) 1,000 mg in 0.9% sodium chloride (MBP/ADV) 100 mL, 1,000 mg, IntraVENous, ONCE, Clara Gonzales MD 
 
 
 
No Known Allergies Review of Systems: A comprehensive review of systems was negative except for: Neurological: positive for paresthesia and Numbness and tingling Vitals:  
 10/31/17 1153 BP: 124/82 Pulse: 74 Resp: 12 SpO2: 98% Weight: 145 lb (65.8 kg) Height: 5' 6\" (1.676 m) Objective: I 
 
 
NEUROLOGICAL EXAM: 
 
Appearance: The patient is well developed, well nourished, provides a coherent history and is in no acute distress. Mental Status: Oriented to time, place and person, and the president, cognitive function is normal and speech is fluent and no aphasia or dysarthria. Mood and affect appropriate. Cranial Nerves:   Intact visual fields. Fundi are benign, but she has slight optic pallor bilaterally. REGINE, EOM's full, no nystagmus, no ptosis. Facial sensation is normal. Corneal reflexes are not tested. Facial movement is symmetric. Hearing is normal bilaterally. Palate is midline with normal sternocleidomastoid and trapezius muscles are normal. Tongue is midline. Neck without meningismus or bruits, but patient has a clear Lhermitte's sign present on neck flexion Motor:  5/5 strength in upper and lower proximal and distal muscles. Normal bulk and tone. No fasciculations. Reflexes:   Deep tendon reflexes 2+/4 and symmetrical. 
No babinski or clonus present Sensory:   Normal to touch, pinprick and vibration and temperature. DSS is intact Gait:  Normal gait, perhaps slightly stiff and spastic. Tremor:   No tremor noted. Cerebellar:  No cerebellar signs present. Neurovascular:  Normal heart sounds and regular rhythm, peripheral pulses intact, and no carotid bruits. Assessment: ICD-10-CM ICD-9-CM 1. Multiple sclerosis (HCC) G35 340 esomeprazole (NEXIUM) 20 mg capsule  
   b complex vitamins (B COMPLEX 1) tablet  
   cholecalciferol, vitamin D3, (VITAMIN D3) 2,000 unit tab MRI BRAIN W WO CONT  
   MRI CERV SPINE W WO CONT  
   MARIANA COMPREHENSIVE PLUS PANEL  
   CK SED RATE (ESR) CBC WITH AUTOMATED DIFF  
   METABOLIC PANEL, COMPREHENSIVE  
   VITAMIN D, 25 HYROXY PANEL  
   VITAMIN B12 & FOLATE  
   GLIADIN ABS, IGA AND IGG  
   REFERRAL TO INFUSION THERAPY  
   methylPREDNISolone (SOLU-MEDROL) 1,000 mg injection 2. 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  
   cholecalciferol, vitamin D3, (VITAMIN D3) 2,000 unit tab MRI BRAIN W WO CONT  
   MRI CERV SPINE W WO CONT  
   MARIANA COMPREHENSIVE PLUS PANEL  
   CK SED RATE (ESR) CBC WITH AUTOMATED DIFF  
   METABOLIC PANEL, COMPREHENSIVE  
   VITAMIN D, 25 HYROXY PANEL  
   VITAMIN B12 & FOLATE  
   GLIADIN ABS, IGA AND IGG  
   REFERRAL TO INFUSION THERAPY  
   methylPREDNISolone (SOLU-MEDROL) 1,000 mg injection 3. Vitamin D deficiency E55.9 268.9 esomeprazole (NEXIUM) 20 mg capsule  
   b complex vitamins (B COMPLEX 1) tablet  
   cholecalciferol, vitamin D3, (VITAMIN D3) 2,000 unit tab MRI BRAIN W WO CONT MRI CERV SPINE W WO CONT  
   MARIANA COMPREHENSIVE PLUS PANEL  
   CK SED RATE (ESR) CBC WITH AUTOMATED DIFF  
   METABOLIC PANEL, COMPREHENSIVE  
   VITAMIN D, 25 HYROXY PANEL  
   VITAMIN B12 & FOLATE  
   GLIADIN ABS, IGA AND IGG  
   REFERRAL TO INFUSION THERAPY  
   methylPREDNISolone (SOLU-MEDROL) 1,000 mg injection 4. B12 deficiency E53.8 266.2 esomeprazole (NEXIUM) 20 mg capsule  
   b complex vitamins (B COMPLEX 1) tablet  
   cholecalciferol, vitamin D3, (VITAMIN D3) 2,000 unit tab MRI BRAIN W WO CONT  
   MRI CERV SPINE W WO CONT  
   MARIANA COMPREHENSIVE PLUS PANEL  
   CK SED RATE (ESR) CBC WITH AUTOMATED DIFF  
   METABOLIC PANEL, COMPREHENSIVE  
   VITAMIN D, 25 HYROXY PANEL  
   VITAMIN B12 & FOLATE  
   GLIADIN ABS, IGA AND IGG  
   REFERRAL TO INFUSION THERAPY  
   methylPREDNISolone (SOLU-MEDROL) 1,000 mg injection Active Problems: * No active hospital problems. * 
 
 
Plan:  
 
Patient with probable recurrent myelitis, and needs IV steroids to help her recover more quickly and treat her demyelinating disease Patient will also get multiple metabolic screens, to rule out any other cause of her symptoms. Patient needs repeat MRI scan of the cervical spine and brain to evaluate her disease status, and decide whether or not we need to change her disease modifying drug We need to evaluate the blurred vision in the right eye. Very difficult case, patient at high risk for progressive disability, and major neurological sequelae if she does not get better She will check my chart for results of her test, or call us if there is any question. Follow-up in 1 month's time or earlier if needed. Patient to finish her oral steroids when she is finished her IV infusion. Signed By: Bong Tracey MD   
 October 31, 2017 CC: Ben Baker MD 
FAX: [phone] This note will not be viewable in [address] If you have questions, please do not hesitate to call me. I look forward to following Ms. Sarah Ramseyjorge along with you. Sincerely, Ciaran Cabral MD

## 2017-10-31 NOTE — MR AVS SNAPSHOT
Visit Information Date & Time Provider Department Dept. Phone Encounter #  
 10/31/2017 11:40 AM Belinda Ybarra MD Neurology [facility] at [facility] [phone] [id number] Follow-up Instructions Return in about 4 weeks (around 11/28/2017). Upcoming Health Maintenance Date Due Pneumococcal 19-64 Medium Risk (1 of 1 - PPSV23) 8/13/1991 DTaP/Tdap/Td series (1 - Tdap) 8/13/1993 PAP AKA CERVICAL CYTOLOGY 8/13/1993 INFLUENZA AGE 9 TO ADULT 8/1/2017 Allergies as of 10/31/2017  Review Complete On: 10/31/2017 By: Belinda Ybarra MD  
 No Known Allergies Current Immunizations  Never Reviewed No immunizations on file. Not reviewed this visit You Were Diagnosed With   
  
 Codes Comments Multiple sclerosis ([facility]    -  Primary ICD-10-CM: G35 
ICD-9-CM: 016 Acute transverse myelitis in demyelinating disease of central nervous system ([facility]     ICD-10-CM: G37.3 ICD-9-CM: 341.9, 341.21 Vitamin D deficiency     ICD-10-CM: E55.9 ICD-9-CM: 268.9 B12 deficiency     ICD-10-CM: E53.8 ICD-9-CM: 266.2 Vitals BP Pulse Resp Height(growth percentile) Weight(growth percentile) SpO2  
 124/82 74 12 5' 6\" (1.676 m) 145 lb (65.8 kg) 98% BMI OB Status Smoking Status 23.4 kg/m2 Having regular periods Never Smoker Vitals History BMI and BSA Data Body Mass Index Body Surface Area  
 23.4 kg/m 2 1.75 m 2 Preferred Pharmacy Pharmacy Name Phone [id number] [phone], [address] [phone] Your Updated Medication List  
  
   
This list is accurate as of: 10/31/17 12:43 PM.  Always use your most recent med list.  
  
  
  
  
 albuterol 90 mcg/actuation inhaler Commonly known as:  PROVENTIL HFA, VENTOLIN HFA, PROAIR HFA Take 2 Puffs by inhalation every four (4) hours as needed. B COMPLEX 1 tablet Generic drug:  b complex vitamins Take 1 Tab by mouth daily. COPAXONE SC  
1 Dose by SubCUTAneous route daily. fluticasone 50 mcg/actuation nasal spray Commonly known as:  FLONASE  
2 sprays each nostril one time daily for 30 days. methylPREDNISolone 1,000 mg injection Commonly known as:  SOLU-Medrol  
1,000 mg by IntraVENous route daily for 3 days. NexIUM 20 mg capsule Generic drug:  esomeprazole Take  by mouth daily. predniSONE 10 mg tablet Commonly known as:  Margarita Winifredink Take 4 tabs by mouth one time daily on day 1.   3 tabs on day 2.  2 tabs on day 3. Take with food. VITAMIN D3 2,000 unit Tab Generic drug:  cholecalciferol (vitamin D3) Take  by mouth. Prescriptions Printed Refills  
 methylPREDNISolone (SOLU-MEDROL) 1,000 mg injection 0 Si,000 mg by IntraVENous route daily for 3 days. Class: Print Route: IntraVENous We Performed the Following MARIANA COMPREHENSIVE PLUS PANEL [[id number] Custom] CBC WITH AUTOMATED DIFF [30059 CPT(R)] CK [id number] CPT(R)] GLIADIN ABS, IGA AND IGG [[id number] Custom] METABOLIC PANEL, COMPREHENSIVE [82426 CPT(R)] REFERRAL TO INFUSION THERAPY [SLT415 Custom] Comments:  
 [facility] for IV solumedrol 1 gram IV every day for 3 days for MS  
 SED RATE (ESR) [20663 CPT(R)] VITAMIN B12 & FOLATE [18869 CPT(R)] VITAMIN D, 25 HYROXY PANEL [[id number] Custom] Follow-up Instructions Return in about 4 weeks (around 2017). To-Do List   
 2017 Imaging:  MRI BRAIN W WO CONT   
  
 2017 Imaging:  MRI CERV SPINE W WO CONT Referral Information Referral ID Referred By Referred To  
  
 [id number] Masha STALEY Not Available Visits Status Start Date End Date 1 New Request 10/31/17 10/31/18 If your referral has a status of pending review or denied, additional information will be sent to support the outcome of this decision. Patient Instructions A Healthy Lifestyle: Care Instructions Your Care Instructions A healthy lifestyle can help you feel good, stay at a healthy weight, and have plenty of energy for both work and play. A healthy lifestyle is something you can share with your whole family. A healthy lifestyle also can lower your risk for serious health problems, such as high blood pressure, heart disease, and diabetes. You can follow a few steps listed below to improve your health and the health of your family. Follow-up care is a key part of your treatment and safety. Be sure to make and go to all appointments, and call your doctor if you are having problems. It's also a good idea to know your test results and keep a list of the medicines you take. How can you care for yourself at home? · Do not eat too much sugar, fat, or fast foods. You can still have dessert and treats now and then. The goal is moderation. · Start small to improve your eating habits. Pay attention to portion sizes, drink less juice and soda pop, and eat more fruits and vegetables. ¨ Eat a healthy amount of food. A 3-ounce serving of meat, for example, is about the size of a deck of cards. Fill the rest of your plate with vegetables and whole grains. ¨ Limit the amount of soda and sports drinks you have every day. Drink more water when you are thirsty. ¨ Eat at least 5 servings of fruits and vegetables every day. It may seem like a lot, but it is not hard to reach this goal. A serving or helping is 1 piece of fruit, 1 cup of vegetables, or 2 cups of leafy, raw vegetables. Have an apple or some carrot sticks as an afternoon snack instead of a candy bar. Try to have fruits and/or vegetables at every meal. 
· Make exercise part of your daily routine. You may want to start with simple activities, such as walking, bicycling, or slow swimming. Try to be active 30 to 60 minutes every day.  You do not need to do all 30 to 60 minutes all at once. For example, you can exercise 3 times a day for 10 or 20 minutes. Moderate exercise is safe for most people, but it is always a good idea to talk to your doctor before starting an exercise program. 
· Keep moving. Gissell Folk the lawn, work in the garden, or [employer]. Take the stairs instead of the elevator at work. · If you smoke, quit. People who smoke have an increased risk for heart attack, stroke, cancer, and other lung illnesses. Quitting is hard, but there are ways to boost your chance of quitting tobacco for good. ¨ Use nicotine gum, patches, or lozenges. ¨ Ask your doctor about stop-smoking programs and medicines. ¨ Keep trying. In addition to reducing your risk of diseases in the future, you will notice some benefits soon after you stop using tobacco. If you have shortness of breath or asthma symptoms, they will likely get better within a few weeks after you quit. · Limit how much alcohol you drink. Moderate amounts of alcohol (up to 2 drinks a day for men, 1 drink a day for women) are okay. But drinking too much can lead to liver problems, high blood pressure, and other health problems. Family health If you have a family, there are many things you can do together to improve your health. · Eat meals together as a family as often as possible. · Eat healthy foods. This includes fruits, vegetables, lean meats and dairy, and whole grains. · Include your family in your fitness plan. Most people think of activities such as jogging or tennis as the way to fitness, but there are many ways you and your family can be more active. Anything that makes you breathe hard and gets your heart pumping is exercise. Here are some tips: 
¨ Walk to do errands or to take your child to school or the bus. ¨ Go for a family bike ride after dinner instead of watching TV. Where can you learn more? Go to http://marv-uvaldo.info/. Enter Y888 in the search box to learn more about \"A Healthy Lifestyle: Care Instructions. \" Current as of: May 12, 2017 Content Version: 11.4 © 3416-0996 [employer]. Care instructions adapted under license by [employer] (which disclaims liability or warranty for this information). If you have questions about a medical condition or this instruction, always ask your healthcare professional. [address] any warranty or liability for your use of this information. Introducing [facility]! Dear Arianna Muhammad: Thank you for requesting a [email or web address] account. Our records indicate that you already have an active [email or web address] account. You can access your account anytime at [email or web address] Did you know that you can access your hospital and ER discharge instructions at any time in [email or web address]? You can also review all of your test results from your hospital stay or ER visit. Additional Information If you have questions, please visit the Frequently Asked Questions section of the [email or web address] website at [email or web address] Remember, [email or web address] is NOT to be used for urgent needs. For medical emergencies, dial 911. Now available from your iPhone and Android! Please provide this summary of care documentation to your next provider. Your primary care clinician is listed as Devante Castano. If you have any questions after today's visit, please call [phone].

## 2017-11-01 ENCOUNTER — TELEPHONE (OUTPATIENT)
Dept: NEUROLOGY | Age: 47
End: 2017-11-01

## 2017-11-01 ENCOUNTER — HOSPITAL ENCOUNTER (OUTPATIENT)
Dept: INFUSION THERAPY | Age: 47
Discharge: HOME | End: 2017-11-01
Payer: COMMERCIAL

## 2017-11-01 VITALS
SYSTOLIC BLOOD PRESSURE: 115 MMHG | RESPIRATION RATE: 18 BRPM | TEMPERATURE: 98.3 F | DIASTOLIC BLOOD PRESSURE: 79 MMHG | HEART RATE: 71 BPM

## 2017-11-01 PROCEDURE — 74011250636 HC RX REV CODE- 250/636: Performed by: PSYCHIATRY & NEUROLOGY

## 2017-11-01 PROCEDURE — 74011000258 HC RX REV CODE- 258: Performed by: PSYCHIATRY & NEUROLOGY

## 2017-11-01 PROCEDURE — 96365 THER/PROPH/DIAG IV INF INIT: CPT

## 2017-11-01 RX ADMIN — SODIUM CHLORIDE 1000 MG: 900 INJECTION, SOLUTION INTRAVENOUS at 15:25

## 2017-11-01 NOTE — PROGRESS NOTES
Consult  REFERRED BY:  Vida Mccarthy MD    CHIEF COMPLAINT: Numbness and tingling in her legs      Subjective: Kavitha Lomeli is a 41 y.o. right-handed -American female, seen as a new patient to us today for evaluation of increased numbness and tingling in her legs, that gets worse when she flexes her neck, at the referral of Dr. Faye Huerta. The patient was diagnosed with multiple sclerosis in 2001, I believe at the [facility], and was treated with Copaxone after a workup including MRIs and complete neurological evaluation. She had a relapse in 2008 that was treated with IV steroids but her Copaxone was maintained. She has not had any imaging of her brain since 2008. She has been lost to medical follow-up, and is probably taking old Copaxone injections that may not be effective. Her problem is that she has been moving frequently for the last few years. 3 or 4 days ago she had the sudden onset of the symptoms, and went to the [facility] near [facility], and was given 1 dose of IV Solu-Medrol and placed on an oral tapering dose of oral steroids. No other testing was done. She was referred to us for further evaluation. She has had no improvement in her symptoms in the interim. She has had no new other focal neurological symptoms. Her symptoms have not improved. She has not had much weakness in the legs, and her bowel and bladder function remain stable, she has not had any major neck pain or back pain other than the discomfort of the Lhermitte's sign. She has no symptoms in her arms at this time. She has had no cranial nerve symptoms, except that she has had some blurred vision in her right eye has been persistent for several weeks. .  She had no unusual viruses, infections, drug exposure, toxin, injury, or any other cause for her symptoms. Her symptoms are very disabling to her at this time.   She has had no other new medical problems, complications or illnesses. Past Medical History:   Diagnosis Date    Asthma     MS (multiple sclerosis) ([facility] [address]       Past Surgical History:   Procedure Laterality Date    HX APPENDECTOMY      HX CHOLECYSTECTOMY      HX GYN      c/sec     Family History   Problem Relation Age of Onset    Hypertension Mother     Other Mother      Hemoglobin C disorder    Asthma Father     Other Maternal Aunt      Hemoglobin C disorder    Cancer Paternal Uncle      pancreatic    Heart Disease Paternal Uncle     Cancer Paternal Grandmother     Heart Disease Paternal Grandfather     Asthma Child       Social History   Substance Use Topics    Smoking status: Never Smoker    Smokeless tobacco: Never Used    Alcohol use 1.2 oz/week     2 Glasses of wine per week      Comment: 4-5 drinks per week         Current Outpatient Prescriptions:     esomeprazole (NEXIUM) 20 mg capsule, Take  by mouth daily. , Disp: , Rfl:     b complex vitamins (B COMPLEX 1) tablet, Take 1 Tab by mouth daily. , Disp: , Rfl:     cholecalciferol, vitamin D3, (VITAMIN D3) 2,000 unit tab, Take  by mouth., Disp: , Rfl:     methylPREDNISolone (SOLU-MEDROL) 1,000 mg injection, 1,000 mg by IntraVENous route daily for 3 days. , Disp: 3000 mg, Rfl: 0    predniSONE (DELTASONE) 10 mg tablet, Take 4 tabs by mouth one time daily on day 1.   3 tabs on day 2.  2 tabs on day 3. Take with food. , Disp: 9 Tab, Rfl: 0    fluticasone (FLONASE) 50 mcg/actuation nasal spray, 2 sprays each nostril one time daily for 30 days. , Disp: 1 Bottle, Rfl: 3    albuterol (PROVENTIL HFA, VENTOLIN HFA, PROAIR HFA) 90 mcg/actuation inhaler, Take 2 Puffs by inhalation every four (4) hours as needed. , Disp: , Rfl:     GLATIRAMER ACETATE (COPAXONE SC), 1 Dose by SubCUTAneous route daily. , Disp: , Rfl:   No current facility-administered medications for this visit.      Facility-Administered Medications Ordered in Other Visits:     [START ON 11/1/2017] methylPREDNISolone ((Solu-MEDROL) 1,000 mg in 0.9% sodium chloride (MBP/ADV) 100 mL, 1,000 mg, IntraVENous, ONCE, MD Tigre Aguirre  Sabinojillian Izaguirre ON 11/2/2017] methylPREDNISolone ((Solu-MEDROL) 1,000 mg in 0.9% sodium chloride (MBP/ADV) 100 mL, 1,000 mg, IntraVENous, ONCE, MD Tigre Aguirre ON 11/3/2017] methylPREDNISolone ((Solu-MEDROL) 1,000 mg in 0.9% sodium chloride (MBP/ADV) 100 mL, 1,000 mg, IntraVENous, ONCE, Leslie Crespo MD        No Known Allergies     Review of Systems:  A comprehensive review of systems was negative except for: Neurological: positive for paresthesia and Numbness and tingling   Vitals:    10/31/17 1153   BP: 124/82   Pulse: 74   Resp: 12   SpO2: 98%   Weight: 145 lb (65.8 kg)   Height: 5' 6\" (1.676 m)     Objective:     I      NEUROLOGICAL EXAM:    Appearance: The patient is well developed, well nourished, provides a coherent history and is in no acute distress. Mental Status: Oriented to time, place and person, and the president, cognitive function is normal and speech is fluent and no aphasia or dysarthria. Mood and affect appropriate. Cranial Nerves:   Intact visual fields. Fundi are benign, but she has slight optic pallor bilaterally. REGINE, EOM's full, no nystagmus, no ptosis. Facial sensation is normal. Corneal reflexes are not tested. Facial movement is symmetric. Hearing is normal bilaterally. Palate is midline with normal sternocleidomastoid and trapezius muscles are normal. Tongue is midline. Neck without meningismus or bruits, but patient has a clear Lhermitte's sign present on neck flexion   Motor:  5/5 strength in upper and lower proximal and distal muscles. Normal bulk and tone. No fasciculations. Reflexes:   Deep tendon reflexes 2+/4 and symmetrical.  No babinski or clonus present   Sensory:   Normal to touch, pinprick and vibration and temperature. DSS is intact   Gait:  Normal gait, perhaps slightly stiff and spastic. Tremor:   No tremor noted.    Cerebellar:  No cerebellar signs present. Neurovascular:  Normal heart sounds and regular rhythm, peripheral pulses intact, and no carotid bruits. Assessment:       ICD-10-CM ICD-9-CM    1. Multiple sclerosis (HCC) G35 340 esomeprazole (NEXIUM) 20 mg capsule      b complex vitamins (B COMPLEX 1) tablet      cholecalciferol, vitamin D3, (VITAMIN D3) 2,000 unit tab      MRI BRAIN W WO CONT      MRI CERV SPINE W WO CONT      MARIANA COMPREHENSIVE PLUS PANEL      CK      SED RATE (ESR)      CBC WITH AUTOMATED DIFF      METABOLIC PANEL, COMPREHENSIVE      VITAMIN D, 25 HYROXY PANEL      VITAMIN B12 & FOLATE      GLIADIN ABS, IGA AND IGG      REFERRAL TO INFUSION THERAPY      methylPREDNISolone (SOLU-MEDROL) 1,000 mg injection   2. 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 Vitamin D deficiency E55.9 268.9 esomeprazole (NEXIUM) 20 mg capsule      b complex vitamins (B COMPLEX 1) tablet      cholecalciferol, vitamin D3, (VITAMIN D3) 2,000 unit tab      MRI BRAIN W WO CONT      MRI CERV SPINE W WO CONT      MARIANA COMPREHENSIVE PLUS PANEL      CK      SED RATE (ESR)      CBC WITH AUTOMATED DIFF      METABOLIC PANEL, COMPREHENSIVE      VITAMIN D, 25 HYROXY PANEL      VITAMIN B12 & FOLATE      GLIADIN ABS, IGA AND IGG      REFERRAL TO INFUSION THERAPY      methylPREDNISolone (SOLU-MEDROL) 1,000 mg injection   4.  B12 deficiency E53.8 266.2 esomeprazole (NEXIUM) 20 mg capsule      b complex vitamins (B COMPLEX 1) tablet      cholecalciferol, vitamin D3, (VITAMIN D3) 2,000 unit tab      MRI BRAIN W WO CONT      MRI CERV SPINE W WO CONT      MARIANA COMPREHENSIVE PLUS PANEL      CK      SED RATE (ESR)      CBC WITH AUTOMATED DIFF      METABOLIC PANEL, COMPREHENSIVE      VITAMIN D, 25 HYROXY PANEL      VITAMIN B12 & FOLATE      GLIADIN ABS, IGA AND IGG      REFERRAL TO INFUSION THERAPY      methylPREDNISolone (SOLU-MEDROL) 1,000 mg injection     Active Problems:    * No active hospital problems. *      Plan:     Patient with probable recurrent myelitis, and needs IV steroids to help her recover more quickly and treat her demyelinating disease  Patient will also get multiple metabolic screens, to rule out any other cause of her symptoms. Patient needs repeat MRI scan of the cervical spine and brain to evaluate her disease status, and decide whether or not we need to change her disease modifying drug  We need to evaluate the blurred vision in the right eye. Very difficult case, patient at high risk for progressive disability, and major neurological sequelae if she does not get better  She will check my chart for results of her test, or call us if there is any question. Follow-up in 1 month's time or earlier if needed. Patient to finish her oral steroids when she is finished her IV infusion. Signed By: Kike Sims MD     October 31, 2017       CC: Jony Rodriguez MD  FAX: [phone]    This note will not be viewable in [address]

## 2017-11-01 NOTE — PROGRESS NOTES
Problem: Infection - Risk of, Central Venous Catheter-Associated Bloodstream Infection  Goal: *Absence of infection signs and symptoms  Outcome: Progressing Towards Goal  Patient here for D1/3 solumedrol

## 2017-11-01 NOTE — TELEPHONE ENCOUNTER
Re:  Copalexandra TOMLINSON request.  Called [employer] [facility] - verified Dr. Sheyla Marcus is in their network ([facility]) Call ref# [id number]. [facility] Ph [phone]. Gave verbal clinicals Libia Mtz [phone] date range 17 - 1718 (from [facility]). Verified specialty pharmacy is [facility]. Faxed auth and start up form (w/ M.D. Signature) to [facility] today. Ph [phone], fax [phone]. Of note:  [place] [facility] Has patient's  as 84.   Verified patient by address and last 4 of SS#.

## 2017-11-01 NOTE — PROGRESS NOTES
[facility] VISIT NOTE    Pt arrived at [facility] ambulatory and in no distress for D1/3 Solumedrol. Assessment completed, pt numbness and tingling in her back and bilateral legs secondary to MS  Patient Vitals for the past 12 hrs:   Temp Pulse Resp BP   11/01/17 1635 98.3 °F (36.8 °C) 71 18 115/79   11/01/17 1508 98.3 °F (36.8 °C) 77 18 127/85     PIV accessed in RAC without difficulty. Medications received: Solumedrol IV    Tolerated treatment well, no adverse reaction noted. PIV de-accessed  Positive blood return noted. D/C'd from [facility] ambulatory and in no distress accompanied by .  Next appointment is 11/2/17 at 3:00

## 2017-11-02 ENCOUNTER — HOSPITAL ENCOUNTER (OUTPATIENT)
Dept: INFUSION THERAPY | Age: 47
Discharge: HOME | End: 2017-11-02
Payer: COMMERCIAL

## 2017-11-02 VITALS
DIASTOLIC BLOOD PRESSURE: 75 MMHG | SYSTOLIC BLOOD PRESSURE: 111 MMHG | HEART RATE: 89 BPM | TEMPERATURE: 98.9 F | RESPIRATION RATE: 16 BRPM

## 2017-11-02 PROCEDURE — 96365 THER/PROPH/DIAG IV INF INIT: CPT

## 2017-11-02 PROCEDURE — 74011000258 HC RX REV CODE- 258: Performed by: PSYCHIATRY & NEUROLOGY

## 2017-11-02 PROCEDURE — 74011250636 HC RX REV CODE- 250/636: Performed by: PSYCHIATRY & NEUROLOGY

## 2017-11-02 RX ADMIN — SODIUM CHLORIDE 1000 MG: 900 INJECTION, SOLUTION INTRAVENOUS at 15:35

## 2017-11-02 NOTE — PROGRESS NOTES
[facility] Nursing Progress Note:    9037 Patient arrived ambulatory and in no distress for Solu-Medrol treatment 2/3 of MS exacerbation. PIV started in [facility] per policy. PT denies new complaints. 1 gm IV solumedrol given and patient tolerated this without difficulty. PIV flushed and wrapped for use tomorrow. 1620: Discharged home ambulatory and in no distress. Next appointment is 11/3/17 @ 1500.

## 2017-11-03 ENCOUNTER — HOSPITAL ENCOUNTER (OUTPATIENT)
Dept: INFUSION THERAPY | Age: 47
Discharge: HOME | End: 2017-11-03
Payer: COMMERCIAL

## 2017-11-03 VITALS
HEART RATE: 63 BPM | RESPIRATION RATE: 16 BRPM | SYSTOLIC BLOOD PRESSURE: 128 MMHG | DIASTOLIC BLOOD PRESSURE: 88 MMHG | TEMPERATURE: 98.3 F

## 2017-11-03 PROCEDURE — 74011000258 HC RX REV CODE- 258: Performed by: PSYCHIATRY & NEUROLOGY

## 2017-11-03 PROCEDURE — 96365 THER/PROPH/DIAG IV INF INIT: CPT

## 2017-11-03 PROCEDURE — 74011250636 HC RX REV CODE- 250/636: Performed by: PSYCHIATRY & NEUROLOGY

## 2017-11-03 RX ADMIN — SODIUM CHLORIDE 1000 MG: 900 INJECTION, SOLUTION INTRAVENOUS at 15:26

## 2017-11-03 NOTE — PROGRESS NOTES
[facility] VISIT NOTE    Pt arrived at [facility] ambulatory and in no distress for Day 3/3 Solumedrol. Assessment completed, pt c/o continued pain in her back and bilateral legs. .  Patient Vitals for the past 12 hrs:   Temp Pulse Resp BP   11/03/17 1600 98.3 °F (36.8 °C) 63 16 128/88   11/03/17 1509 98.1 °F (36.7 °C) (!) 58 - 139/86     Medications received: Solumedrol IV    Tolerated treatment well, no adverse reaction noted. D/C'd from [facility] ambulatory and in no distress. This was patient's final appointment.

## 2017-11-03 NOTE — PROGRESS NOTES
Problem: Knowledge Deficit  Goal: *Verbalizes understanding of procedures and medications  Outcome: Progressing Towards Goal  Pt here for Day 3/3 of Solumedrol

## 2017-11-07 ENCOUNTER — DOCUMENTATION ONLY (OUTPATIENT)
Dept: NEUROLOGY | Age: 47
End: 2017-11-07

## 2017-11-07 ENCOUNTER — TELEPHONE (OUTPATIENT)
Dept: NEUROLOGY | Age: 47
End: 2017-11-07

## 2017-11-07 DIAGNOSIS — G37.3 ACUTE TRANSVERSE MYELITIS IN DEMYELINATING DISEASE OF CENTRAL NERVOUS SYSTEM (HCC): Primary | ICD-10-CM

## 2017-11-07 DIAGNOSIS — G35 MULTIPLE SCLEROSIS (HCC): ICD-10-CM

## 2017-11-07 RX ORDER — GABAPENTIN 300 MG/1
300 CAPSULE ORAL 3 TIMES DAILY
Qty: 100 CAP | Refills: 5 | OUTPATIENT
Start: 2017-11-07 | End: 2017-11-15 | Stop reason: SDUPTHER

## 2017-11-14 ENCOUNTER — TELEPHONE (OUTPATIENT)
Dept: NEUROLOGY | Age: 47
End: 2017-11-14

## 2017-11-14 NOTE — TELEPHONE ENCOUNTER
Note:  Date range of Copaxone auth is 11/1/17  - 11/1/18./ ref [id number]. (rec'd approval letter from [facility]).

## 2017-11-15 DIAGNOSIS — G35 MULTIPLE SCLEROSIS (HCC): ICD-10-CM

## 2017-11-15 DIAGNOSIS — G37.3 ACUTE TRANSVERSE MYELITIS IN DEMYELINATING DISEASE OF CENTRAL NERVOUS SYSTEM (HCC): Primary | ICD-10-CM

## 2017-11-15 RX ORDER — GABAPENTIN 300 MG/1
600 CAPSULE ORAL 3 TIMES DAILY
Qty: 200 CAP | Refills: 5 | Status: SHIPPED | OUTPATIENT
Start: 2017-11-15 | End: 2018-04-09 | Stop reason: SDUPTHER

## 2017-11-15 NOTE — PROGRESS NOTES
I called the patient, she is doing better, we will increase the Neurontin slightly up to 2caps 3 times a day if needed.

## 2017-11-16 LAB
25(OH)D2 SERPL-MCNC: 3.1 NG/ML
25(OH)D3 SERPL-MCNC: 33 NG/ML
25(OH)D3+25(OH)D2 SERPL-MCNC: 36 NG/ML
ALBUMIN SERPL-MCNC: 4.5 G/DL (ref 3.5–5.5)
ALBUMIN/GLOB SERPL: 1.6 {RATIO} (ref 1.2–2.2)
ALP SERPL-CCNC: 59 IU/L (ref 39–117)
ALT SERPL-CCNC: 17 IU/L (ref 0–32)
AST SERPL-CCNC: 15 IU/L (ref 0–40)
BASOPHILS # BLD AUTO: 0 X10E3/UL (ref 0–0.2)
BASOPHILS NFR BLD AUTO: 0 %
BILIRUB SERPL-MCNC: 0.3 MG/DL (ref 0–1.2)
BUN SERPL-MCNC: 12 MG/DL (ref 6–24)
BUN/CREAT SERPL: 18 (ref 9–23)
CALCIUM SERPL-MCNC: 9 MG/DL (ref 8.7–10.2)
CENTROMERE B AB SER-ACNC: <0.2 AI (ref 0–0.9)
CHLORIDE SERPL-SCNC: 97 MMOL/L (ref 96–106)
CHROMATIN AB SERPL-ACNC: <0.2 AI (ref 0–0.9)
CK SERPL-CCNC: 23 U/L (ref 24–173)
CO2 SERPL-SCNC: 25 MMOL/L (ref 18–29)
CREAT SERPL-MCNC: 0.68 MG/DL (ref 0.57–1)
DSDNA AB SER-ACNC: <1 IU/ML (ref 0–9)
ENA JO1 AB SER-ACNC: <0.2 AI (ref 0–0.9)
ENA RNP AB SER-ACNC: <0.2 AI (ref 0–0.9)
ENA SCL70 AB SER-ACNC: <0.2 AI (ref 0–0.9)
ENA SM AB SER-ACNC: <0.2 AI (ref 0–0.9)
ENA SM+RNP AB SER-ACNC: 1.4 AI (ref 0–0.9)
ENA SS-A AB SER-ACNC: <0.2 AI (ref 0–0.9)
ENA SS-B AB SER-ACNC: <0.2 AI (ref 0–0.9)
EOSINOPHIL # BLD AUTO: 0.1 X10E3/UL (ref 0–0.4)
EOSINOPHIL NFR BLD AUTO: 1 %
ERYTHROCYTE [DISTWIDTH] IN BLOOD BY AUTOMATED COUNT: 15.8 % (ref 12.3–15.4)
ERYTHROCYTE [SEDIMENTATION RATE] IN BLOOD BY WESTERGREN METHOD: 13 MM/HR (ref 0–32)
FOLATE SERPL-MCNC: 17.1 NG/ML
GFR SERPLBLD CREATININE-BSD FMLA CKD-EPI: 106 ML/MIN/1.73
GFR SERPLBLD CREATININE-BSD FMLA CKD-EPI: 122 ML/MIN/1.73
GLIADIN PEPTIDE IGA SER-ACNC: 3 UNITS (ref 0–19)
GLIADIN PEPTIDE IGG SER-ACNC: 2 UNITS (ref 0–19)
GLOBULIN SER CALC-MCNC: 2.9 G/DL (ref 1.5–4.5)
GLUCOSE SERPL-MCNC: 82 MG/DL (ref 65–99)
HCT VFR BLD AUTO: 38 % (ref 34–46.6)
HGB BLD-MCNC: 12.9 G/DL (ref 11.1–15.9)
IMM GRANULOCYTES # BLD: 0 X10E3/UL (ref 0–0.1)
IMM GRANULOCYTES NFR BLD: 0 %
LYMPHOCYTES # BLD AUTO: 1.9 X10E3/UL (ref 0.7–3.1)
LYMPHOCYTES NFR BLD AUTO: 20 %
MCH RBC QN AUTO: 28.8 PG (ref 26.6–33)
MCHC RBC AUTO-ENTMCNC: 33.9 G/DL (ref 31.5–35.7)
MCV RBC AUTO: 85 FL (ref 79–97)
MONOCYTES # BLD AUTO: 0.9 X10E3/UL (ref 0.1–0.9)
MONOCYTES NFR BLD AUTO: 9 %
NEUTROPHILS # BLD AUTO: 6.6 X10E3/UL (ref 1.4–7)
NEUTROPHILS NFR BLD AUTO: 70 %
PLATELET # BLD AUTO: 239 X10E3/UL (ref 150–379)
POTASSIUM SERPL-SCNC: 3.9 MMOL/L (ref 3.5–5.2)
PROT SERPL-MCNC: 7.4 G/DL (ref 6–8.5)
RBC # BLD AUTO: 4.48 X10E6/UL (ref 3.77–5.28)
RIBOSOMAL P AB SER-ACNC: <0.2 AI (ref 0–0.9)
SEE BELOW:, 164879: ABNORMAL
SODIUM SERPL-SCNC: 140 MMOL/L (ref 134–144)
VIT B12 SERPL-MCNC: 700 PG/ML (ref 211–946)
WBC # BLD AUTO: 9.6 X10E3/UL (ref 3.4–10.8)

## 2017-12-01 ENCOUNTER — HOSPITAL ENCOUNTER (OUTPATIENT)
Dept: MRI IMAGING | Age: 47
Discharge: HOME | End: 2017-12-01
Attending: PSYCHIATRY & NEUROLOGY
Payer: COMMERCIAL

## 2017-12-01 VITALS — WEIGHT: 145 LBS | BODY MASS INDEX: 23.4 KG/M2

## 2017-12-01 DIAGNOSIS — G37.3 ACUTE TRANSVERSE MYELITIS IN DEMYELINATING DISEASE OF CENTRAL NERVOUS SYSTEM (HCC): ICD-10-CM

## 2017-12-01 DIAGNOSIS — E55.9 VITAMIN D DEFICIENCY: ICD-10-CM

## 2017-12-01 DIAGNOSIS — E53.8 B12 DEFICIENCY: ICD-10-CM

## 2017-12-01 DIAGNOSIS — G35 MULTIPLE SCLEROSIS (HCC): ICD-10-CM

## 2017-12-01 PROCEDURE — 74011250636 HC RX REV CODE- 250/636: Performed by: RADIOLOGY

## 2017-12-01 PROCEDURE — 70553 MRI BRAIN STEM W/O & W/DYE: CPT

## 2017-12-01 PROCEDURE — 72156 MRI NECK SPINE W/O & W/DYE: CPT

## 2017-12-01 PROCEDURE — A9576 INJ PROHANCE MULTIPACK: HCPCS | Performed by: RADIOLOGY

## 2017-12-01 RX ADMIN — GADOTERIDOL 13 ML: 279.3 INJECTION, SOLUTION INTRAVENOUS at 12:39

## 2017-12-08 ENCOUNTER — OFFICE VISIT (OUTPATIENT)
Dept: NEUROLOGY | Age: 47
End: 2017-12-08

## 2017-12-08 VITALS
HEART RATE: 67 BPM | BODY MASS INDEX: 23.3 KG/M2 | SYSTOLIC BLOOD PRESSURE: 116 MMHG | OXYGEN SATURATION: 99 % | HEIGHT: 66 IN | WEIGHT: 145 LBS | DIASTOLIC BLOOD PRESSURE: 74 MMHG

## 2017-12-08 DIAGNOSIS — G35 MULTIPLE SCLEROSIS (HCC): ICD-10-CM

## 2017-12-08 DIAGNOSIS — E55.9 VITAMIN D DEFICIENCY: ICD-10-CM

## 2017-12-08 DIAGNOSIS — G37.3 ACUTE TRANSVERSE MYELITIS IN DEMYELINATING DISEASE OF CENTRAL NERVOUS SYSTEM (HCC): Primary | ICD-10-CM

## 2017-12-08 DIAGNOSIS — E53.8 B12 DEFICIENCY: ICD-10-CM

## 2017-12-08 RX ORDER — GLATIRAMER ACETATE 40 MG/ML
40 INJECTION, SOLUTION SUBCUTANEOUS 3 TIMES WEEKLY
COMMUNITY
Start: 2017-11-01 | End: 2018-01-19 | Stop reason: SDUPTHER

## 2017-12-08 NOTE — PATIENT INSTRUCTIONS

## 2017-12-08 NOTE — LETTER
12/9/2017 8:24 AM 
 
Patient:  Leatha Mendes YOB: 1970 Date of Visit: 12/8/2017 Dear No Recipients: Thank you for referring Ms. Thania Cooney to me for evaluation/treatment. Below are the relevant portions of my assessment and plan of care. Consult REFERRED BY: 
Viridiana Best MD 
 
CHIEF COMPLAINT: Numbness and tingling in her legs Subjective: Leatha Mendes is a 41 y.o. right-handed -American female, seen for new problem of needing to go over her recent MRI scans and discussed the meaning and results of the scans and to discuss therapy for her disease multiple sclerosis, and for evaluation of increased numbness and tingling in her legs, that gets worse when she flexes her neck, at the referral of Dr. Ellen Tamayo. The patient was diagnosed with multiple sclerosis in 2001, I believe at the [facility], and was treated with Copaxone after a workup including MRIs and complete neurological evaluation. Her MRI scan of the brain shows several white matter lesions typical of demyelinating disease multiple sclerosis, an MRI scan of the cervical spine shows no active lesions, just mild degenerative changes. She has no enhancing lesions which is all good for stability of her MS. Her sensory Lhermitte sign and symptoms are much better on Neurontin 300 mg 2-3 times a day. She is able to function well now, and was given steroids without much effect. Has MS and she had a relapse in 2008 that was treated with IV steroids but her Copaxone was maintained. She has been lost to medical follow-up, and was probably taking old Copaxone injections that may not be effective. Her problem is that she has been moving frequently for the last few years.   She has not had much weakness in the legs, and her bowel and bladder function remain stable, she has not had any major neck pain or back pain other than the discomfort of the Lhermitte's sign. She has no symptoms in her arms at this time. She has had no cranial nerve symptoms, except that she has had some blurred vision in her right eye has been persistent for several weeks. .  She had no unusual viruses, infections, drug exposure, toxin, injury, or any other cause for her symptoms. Her symptoms are very disabling to her at this time. She has had no other new medical problems, complications or illnesses. Past Medical History:  
Diagnosis Date  Asthma  MS (multiple sclerosis) ([facility] [address] Past Surgical History:  
Procedure Laterality Date  HX APPENDECTOMY  HX CHOLECYSTECTOMY  HX GYN    
 c/sec Family History Problem Relation Age of Onset  Hypertension Mother Aundria Dadds Other Mother Hemoglobin C disorder  Asthma Father  Other Maternal Aunt Hemoglobin C disorder  Cancer Paternal Uncle   
  pancreatic  Heart Disease Paternal Uncle  Cancer Paternal Grandmother  Heart Disease Paternal Grandfather  Asthma Child Social History Substance Use Topics  Smoking status: Never Smoker  Smokeless tobacco: Never Used  Alcohol use 1.2 oz/week 2 Glasses of wine per week Comment: 4-5 drinks per week Current Outpatient Prescriptions:  
  COPAXONE 40 mg/mL injection, 40 mg by SubCUTAneous route Three (3) times a week., Disp: , Rfl:  
  gabapentin (NEURONTIN) 300 mg capsule, Take 2 Caps by mouth three (3) times daily. , Disp: 200 Cap, Rfl: 5 
  esomeprazole (NEXIUM) 20 mg capsule, Take  by mouth daily. , Disp: , Rfl:  
  b complex vitamins (B COMPLEX 1) tablet, Take 1 Tab by mouth daily. , Disp: , Rfl:  
  cholecalciferol, vitamin D3, (VITAMIN D3) 2,000 unit tab, Take  by mouth., Disp: , Rfl:  
  fluticasone (FLONASE) 50 mcg/actuation nasal spray, 2 sprays each nostril one time daily for 30 days. , Disp: 1 Bottle, Rfl: 3 
  albuterol (PROVENTIL HFA, VENTOLIN HFA, PROAIR HFA) 90 mcg/actuation inhaler, Take 2 Puffs by inhalation every four (4) hours as needed. , Disp: , Rfl:  
 
 
 
No Known Allergies Review of Systems: A comprehensive review of systems was negative except for: Neurological: positive for paresthesia and Numbness and tingling Vitals:  
 12/08/17 1131 BP: 116/74 Pulse: 67 SpO2: 99% Weight: 145 lb (65.8 kg) Height: 5' 6\" (1.676 m) Objective: I 
 
 
NEUROLOGICAL EXAM: 
 
Appearance: The patient is well developed, well nourished, provides a coherent history and is in no acute distress. Mental Status: Oriented to time, place and person, and the president, cognitive function is normal and speech is fluent and no aphasia or dysarthria. Mood and affect appropriate. Cranial Nerves:   Intact visual fields. Fundi are benign, but she has slight optic pallor bilaterally. REGINE, EOM's full, no nystagmus, no ptosis. Facial sensation is normal. Corneal reflexes are not tested. Facial movement is symmetric. Hearing is normal bilaterally. Palate is midline with normal sternocleidomastoid and trapezius muscles are normal. Tongue is midline. Neck without meningismus or bruits, but patient has a clear Lhermitte's sign present on neck flexion Motor:  5/5 strength in upper and lower proximal and distal muscles. Normal bulk and tone. No fasciculations. Reflexes:   Deep tendon reflexes 2+/4 and symmetrical. 
No babinski or clonus present Sensory:   Normal to touch, pinprick and vibration and temperature. DSS is intact Gait:  Normal gait, perhaps slightly stiff and spastic. Tremor:   No tremor noted. Cerebellar:  No cerebellar signs present. Neurovascular:  Normal heart sounds and regular rhythm, peripheral pulses intact, and no carotid bruits. Assessment: ICD-10-CM ICD-9-CM 1. Acute transverse myelitis in demyelinating disease of central nervous system ([facility]) G37.3 341.9   
  341.21   
2. Multiple sclerosis ([email or web address] [facility] G35 340 3. B12 deficiency E53.8 266.2 4. Vitamin D deficiency E55.9 268.9 Active Problems: * No active hospital problems. * 
 
 
Plan:  
 
Patient's MRI scan reviewed personally on the PACS system with the patient today, both the cervical and brain scans with her, we discussed the meaning that this means her disease seems stable on Copaxone therapy, and I would recommend she continue this drug as it seems to be holding her well. For her myelitis symptoms, she will continue the Neurontin which seems to be providing symptomatic and therapeutic relief. She has had no side effects on the medications. She does not seem to have that much spasticity or need for other medication at this time We encouraged her to remain mentally and physically active, take vitamins and vitamin D every day, and eat a good diet, and make sure she takes her medications. We will see her again in 6 months time or earlier as need be for follow-up, she is advised he needs to call us right away if she has any other symptoms in the interim. We also discussed some of the other disease options medications available but after a long talk, we will continue her current treatment as the best for her at this time. 935 minutes spent with the patient, 20 minutes counseling her on her disease, results of her test, meaning of the test results, and the fact that with these results I recommend she continue her Copaxone. Patient to finish her oral steroids when she is finished her IV infusion. Signed By: Apollo Suarez MD   
 December 9, 2017 CC: Vida Mccarthy MD 
FAX: [phone] This note will not be viewable in [address] If you have questions, please do not hesitate to call me. I look forward to following Ms. Adiel Curry along with you. Sincerely, Apollo Suarez MD

## 2017-12-08 NOTE — MR AVS SNAPSHOT
Visit Information Date & Time Provider Department Dept. Phone Encounter #  
 12/8/2017 12:00 PM Fran Patel MD [facility] [phone] [id number] Follow-up Instructions Return in about 6 months (around 6/8/2018). Your Appointments 12/8/2017 12:00 PM  
Follow Up with Fran Patel MD  
[facility] at [facility] [address]) Appt Note: f/u per Crystal Barger 10/31/17; f/u per Greyson Carvajal [address], 
[id number], [address] [address]  
[address] [facility], [address] [place] [address], [address] [place] [address] [address] [phone] Upcoming Health Maintenance Date Due Pneumococcal 19-64 Medium Risk (1 of 1 - PPSV23) 8/13/1991 DTaP/Tdap/Td series (1 - Tdap) 8/13/1993 PAP AKA CERVICAL CYTOLOGY 8/13/1993 Allergies as of 12/8/2017  Review Complete On: 12/8/2017 By: Fran Patel MD  
 No Known Allergies Current Immunizations  Reviewed on 11/3/2017 Name Date Influenza Vaccine 10/1/2017 Not reviewed this visit You Were Diagnosed With   
  
 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 
ICD-9-CM: 900 B12 deficiency     ICD-10-CM: E53.8 ICD-9-CM: 266.2 Vitamin D deficiency     ICD-10-CM: E55.9 ICD-9-CM: 268.9 Vitals BP Pulse Height(growth percentile) Weight(growth percentile) SpO2 BMI  
 116/74 67 5' 6\" (1.676 m) 145 lb (65.8 kg) 99% 23.4 kg/m2 OB Status Smoking Status Having regular periods Never Smoker BMI and BSA Data Body Mass Index Body Surface Area  
 23.4 kg/m 2 1.75 m 2 Preferred Pharmacy Pharmacy Name Phone [id number] [phone], [address] [phone] Your Updated Medication List  
  
   
 This list is accurate as of: 12/8/17 11:59 AM.  Always use your most recent med list.  
  
  
  
  
 albuterol 90 mcg/actuation inhaler Commonly known as:  PROVENTIL HFA, VENTOLIN HFA, PROAIR HFA Take 2 Puffs by inhalation every four (4) hours as needed. B COMPLEX 1 tablet Generic drug:  b complex vitamins Take 1 Tab by mouth daily. COPAXONE 40 mg/mL injection Generic drug:  glatiramer 40 mg by SubCUTAneous route Three (3) times a week. fluticasone 50 mcg/actuation nasal spray Commonly known as:  FLONASE  
2 sprays each nostril one time daily for 30 days. gabapentin 300 mg capsule Commonly known as:  NEURONTIN Take 2 Caps by mouth three (3) times daily. NexIUM 20 mg capsule Generic drug:  esomeprazole Take  by mouth daily. VITAMIN D3 2,000 unit Tab Generic drug:  cholecalciferol (vitamin D3) Take  by mouth. Follow-up Instructions Return in about 6 months (around 6/8/2018). Patient Instructions A Healthy Lifestyle: Care Instructions Your Care Instructions A healthy lifestyle can help you feel good, stay at a healthy weight, and have plenty of energy for both work and play. A healthy lifestyle is something you can share with your whole family. A healthy lifestyle also can lower your risk for serious health problems, such as high blood pressure, heart disease, and diabetes. You can follow a few steps listed below to improve your health and the health of your family. Follow-up care is a key part of your treatment and safety. Be sure to make and go to all appointments, and call your doctor if you are having problems. It's also a good idea to know your test results and keep a list of the medicines you take. How can you care for yourself at home? · Do not eat too much sugar, fat, or fast foods. You can still have dessert and treats now and then. The goal is moderation. · Start small to improve your eating habits. Pay attention to portion sizes, drink less juice and soda pop, and eat more fruits and vegetables. ¨ Eat a healthy amount of food. A 3-ounce serving of meat, for example, is about the size of a deck of cards. Fill the rest of your plate with vegetables and whole grains. ¨ Limit the amount of soda and sports drinks you have every day. Drink more water when you are thirsty. ¨ Eat at least 5 servings of fruits and vegetables every day. It may seem like a lot, but it is not hard to reach this goal. A serving or helping is 1 piece of fruit, 1 cup of vegetables, or 2 cups of leafy, raw vegetables. Have an apple or some carrot sticks as an afternoon snack instead of a candy bar. Try to have fruits and/or vegetables at every meal. 
· Make exercise part of your daily routine. You may want to start with simple activities, such as walking, bicycling, or slow swimming. Try to be active 30 to 60 minutes every day. You do not need to do all 30 to 60 minutes all at once. For example, you can exercise 3 times a day for 10 or 20 minutes. Moderate exercise is safe for most people, but it is always a good idea to talk to your doctor before starting an exercise program. 
· Keep moving. Nirchristina Juan the lawn, work in the garden, or [employer]. Take the stairs instead of the elevator at work. · If you smoke, quit. People who smoke have an increased risk for heart attack, stroke, cancer, and other lung illnesses. Quitting is hard, but there are ways to boost your chance of quitting tobacco for good. ¨ Use nicotine gum, patches, or lozenges. ¨ Ask your doctor about stop-smoking programs and medicines. ¨ Keep trying. In addition to reducing your risk of diseases in the future, you will notice some benefits soon after you stop using tobacco. If you have shortness of breath or asthma symptoms, they will likely get better within a few weeks after you quit. · Limit how much alcohol you drink. Moderate amounts of alcohol (up to 2 drinks a day for men, 1 drink a day for women) are okay. But drinking too much can lead to liver problems, high blood pressure, and other health problems. Family health If you have a family, there are many things you can do together to improve your health. · Eat meals together as a family as often as possible. · Eat healthy foods. This includes fruits, vegetables, lean meats and dairy, and whole grains. · Include your family in your fitness plan. Most people think of activities such as jogging or tennis as the way to fitness, but there are many ways you and your family can be more active. Anything that makes you breathe hard and gets your heart pumping is exercise. Here are some tips: 
¨ Walk to do errands or to take your child to school or the bus. ¨ Go for a family bike ride after dinner instead of watching TV. Where can you learn more? Go to http://marv-uvaldo.info/. Enter K135 in the search box to learn more about \"A Healthy Lifestyle: Care Instructions. \" Current as of: May 12, 2017 Content Version: 11.4 © 5233-5073 [employer]. Care instructions adapted under license by [employer] (which disclaims liability or warranty for this information). If you have questions about a medical condition or this instruction, always ask your healthcare professional. [address] any warranty or liability for your use of this information. Introducing [facility]! Dear Jeana Senior: Thank you for requesting a Vinja account. Our records indicate that you already have an active Vinja account. You can access your account anytime at [email or web address] Did you know that you can access your hospital and ER discharge instructions at any time in Vinja? You can also review all of your test results from your hospital stay or ER visit. Additional Information If you have questions, please visit the Frequently Asked Questions section of the Allylix[employer] website at [email or web address] Remember, [employer]That[employer] is NOT to be used for urgent needs. For medical emergencies, dial 911. Now available from your iPhone and Android! Please provide this summary of care documentation to your next provider. Your primary care clinician is listed as Juan Hernandez. If you have any questions after today's visit, please call [phone].

## 2017-12-09 NOTE — PROGRESS NOTES
Consult  REFERRED BY:  Deana Shetty MD    CHIEF COMPLAINT: Numbness and tingling in her legs      Subjective: Sonal Vincent is a 41 y.o. right-handed -American female, seen for new problem of needing to go over her recent MRI scans and discussed the meaning and results of the scans and to discuss therapy for her disease multiple sclerosis, and for evaluation of increased numbness and tingling in her legs, that gets worse when she flexes her neck, at the referral of Dr. Dasia Toure. The patient was diagnosed with multiple sclerosis in 2001, I believe at the [facility], and was treated with Copaxone after a workup including MRIs and complete neurological evaluation. Her MRI scan of the brain shows several white matter lesions typical of demyelinating disease multiple sclerosis, an MRI scan of the cervical spine shows no active lesions, just mild degenerative changes. She has no enhancing lesions which is all good for stability of her MS. Her sensory Lhermitte sign and symptoms are much better on Neurontin 300 mg 2-3 times a day. She is able to function well now, and was given steroids without much effect. Has MS and she had a relapse in 2008 that was treated with IV steroids but her Copaxone was maintained. She has been lost to medical follow-up, and was probably taking old Copaxone injections that may not be effective. Her problem is that she has been moving frequently for the last few years. She has not had much weakness in the legs, and her bowel and bladder function remain stable, she has not had any major neck pain or back pain other than the discomfort of the Lhermitte's sign. She has no symptoms in her arms at this time. She has had no cranial nerve symptoms, except that she has had some blurred vision in her right eye has been persistent for several weeks. .  She had no unusual viruses, infections, drug exposure, toxin, injury, or any other cause for her symptoms. Her symptoms are very disabling to her at this time. She has had no other new medical problems, complications or illnesses. Past Medical History:   Diagnosis Date    Asthma     MS (multiple sclerosis) ([facility]       Past Surgical History:   Procedure Laterality Date    HX APPENDECTOMY      HX CHOLECYSTECTOMY      HX GYN      c/sec     Family History   Problem Relation Age of Onset    Hypertension Mother     Other Mother      Hemoglobin C disorder    Asthma Father     Other Maternal Aunt      Hemoglobin C disorder    Cancer Paternal Uncle      pancreatic    Heart Disease Paternal Uncle     Cancer Paternal Grandmother     Heart Disease Paternal Grandfather     Asthma Child       Social History   Substance Use Topics    Smoking status: Never Smoker    Smokeless tobacco: Never Used    Alcohol use 1.2 oz/week     2 Glasses of wine per week      Comment: 4-5 drinks per week         Current Outpatient Prescriptions:     COPAXONE 40 mg/mL injection, 40 mg by SubCUTAneous route Three (3) times a week., Disp: , Rfl:     gabapentin (NEURONTIN) 300 mg capsule, Take 2 Caps by mouth three (3) times daily. , Disp: 200 Cap, Rfl: 5    esomeprazole (NEXIUM) 20 mg capsule, Take  by mouth daily. , Disp: , Rfl:     b complex vitamins (B COMPLEX 1) tablet, Take 1 Tab by mouth daily. , Disp: , Rfl:     cholecalciferol, vitamin D3, (VITAMIN D3) 2,000 unit tab, Take  by mouth., Disp: , Rfl:     fluticasone (FLONASE) 50 mcg/actuation nasal spray, 2 sprays each nostril one time daily for 30 days. , Disp: 1 Bottle, Rfl: 3    albuterol (PROVENTIL HFA, VENTOLIN HFA, PROAIR HFA) 90 mcg/actuation inhaler, Take 2 Puffs by inhalation every four (4) hours as needed. , Disp: , Rfl:         No Known Allergies     Review of Systems:  A comprehensive review of systems was negative except for: Neurological: positive for paresthesia and Numbness and tingling   Vitals:    12/08/17 1131   BP: 116/74   Pulse: 67   SpO2: 99% Weight: 145 lb (65.8 kg)   Height: 5' 6\" (1.676 m)     Objective:     I      NEUROLOGICAL EXAM:    Appearance: The patient is well developed, well nourished, provides a coherent history and is in no acute distress. Mental Status: Oriented to time, place and person, and the president, cognitive function is normal and speech is fluent and no aphasia or dysarthria. Mood and affect appropriate. Cranial Nerves:   Intact visual fields. Fundi are benign, but she has slight optic pallor bilaterally. REGINE, EOM's full, no nystagmus, no ptosis. Facial sensation is normal. Corneal reflexes are not tested. Facial movement is symmetric. Hearing is normal bilaterally. Palate is midline with normal sternocleidomastoid and trapezius muscles are normal. Tongue is midline. Neck without meningismus or bruits, but patient has a clear Lhermitte's sign present on neck flexion   Motor:  5/5 strength in upper and lower proximal and distal muscles. Normal bulk and tone. No fasciculations. Reflexes:   Deep tendon reflexes 2+/4 and symmetrical.  No babinski or clonus present   Sensory:   Normal to touch, pinprick and vibration and temperature. DSS is intact   Gait:  Normal gait, perhaps slightly stiff and spastic. Tremor:   No tremor noted. Cerebellar:  No cerebellar signs present. Neurovascular:  Normal heart sounds and regular rhythm, peripheral pulses intact, and no carotid bruits. Assessment:       ICD-10-CM ICD-9-CM    1. 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 Multiple sclerosis ([facility] G35 340    3. B12 deficiency E53.8 266.2    4. Vitamin D deficiency E55.9 268.9      Active Problems:    * No active hospital problems.  *      Plan:     Patient's MRI scan reviewed personally on the PACS system with the patient today, both the cervical and brain scans with her, we discussed the meaning that this means her disease seems stable on Copaxone therapy, and I would recommend she continue this drug as it seems to be holding her well. For her myelitis symptoms, she will continue the Neurontin which seems to be providing symptomatic and therapeutic relief. She has had no side effects on the medications. She does not seem to have that much spasticity or need for other medication at this time  We encouraged her to remain mentally and physically active, take vitamins and vitamin D every day, and eat a good diet, and make sure she takes her medications. We will see her again in 6 months time or earlier as need be for follow-up, she is advised he needs to call us right away if she has any other symptoms in the interim. We also discussed some of the other disease options medications available but after a long talk, we will continue her current treatment as the best for her at this time. 935 minutes spent with the patient, 20 minutes counseling her on her disease, results of her test, meaning of the test results, and the fact that with these results I recommend she continue her Copaxone. Patient to finish her oral steroids when she is finished her IV infusion. Signed By: Dillan Raymond MD     December 9, 2017       CC: Cleo Butts MD  FAX: [phone]    This note will not be viewable in [address]

## 2018-01-19 RX ORDER — GLATIRAMER ACETATE 40 MG/ML
40 INJECTION, SOLUTION SUBCUTANEOUS 3 TIMES WEEKLY
Qty: 12 SYRINGE | Refills: 6 | Status: SHIPPED | OUTPATIENT
Start: 2018-01-19 | End: 2018-04-09 | Stop reason: SDUPTHER

## 2018-01-19 NOTE — TELEPHONE ENCOUNTER
Patient needs script to be brand medically necessary for Copaxone.  Sent in to the pharmacy for the patient with brand only

## 2018-03-17 NOTE — PATIENT INSTRUCTIONS
[facility], [place], Emergency Department    [address] [place] [id number]    Phone:  [phone]                                       Elizabeth Taylor   MRN: [id number]    Department:  [facility], Emergency Department   Date of Visit:  3/17/2018           After Visit Summary Signature Page     I have received my discharge instructions, and my questions have been answered. I have discussed any challenges I see with this plan with the nurse or doctor.    ..........................................................................................................................................  Patient/Patient Representative Signature      ..........................................................................................................................................  Patient Representative Print Name and Relationship to Patient    ..................................................               ................................................  Date                                            Time    ..........................................................................................................................................  Reviewed by Signature/Title    ...................................................              ..............................................  Date                                                            Time           Constipation: Care Instructions  Your Care Instructions  Constipation means that you have a hard time passing stools (bowel movements). People pass stools from 3 times a day to once every 3 days. What is normal for you may be different. Constipation may occur with pain in the rectum and cramping. The pain may get worse when you try to pass stools. Sometimes there are small amounts of bright red blood on toilet paper or the surface of stools. This is because of enlarged veins near the rectum (hemorrhoids). A few changes in your diet and lifestyle may help you avoid ongoing constipation. Your doctor may also prescribe medicine to help loosen your stool. Some medicines can cause constipation. These include pain medicines and antidepressants. Tell your doctor about all the medicines you take. Your doctor may want to make a medicine change to ease your symptoms. Follow-up care is a key part of your treatment and safety. Be sure to make and go to all appointments, and call your doctor if you are having problems. It's also a good idea to know your test results and keep a list of the medicines you take. How can you care for yourself at home? · Drink plenty of fluids, enough so that your urine is light yellow or clear like water. If you have kidney, heart, or liver disease and have to limit fluids, talk with your doctor before you increase the amount of fluids you drink. · Include high-fiber foods in your diet each day. These include fruits, vegetables, beans, and whole grains. · Get at least 30 minutes of exercise on most days of the week. Walking is a good choice. You also may want to do other activities, such as running, swimming, cycling, or playing tennis or team sports. · Take a fiber supplement, such as Citrucel or Metamucil, every day. Read and follow all instructions on the label. · Schedule time each day for a bowel movement. A daily routine may help.  Take your time having your bowel movement. · Support your feet with a small step stool when you sit on the toilet. This helps flex your hips and places your pelvis in a squatting position. · Your doctor may recommend an over-the-counter laxative to relieve your constipation. Examples are Milk of Magnesia and MiraLax. Read and follow all instructions on the label. Do not use laxatives on a long-term basis. When should you call for help? Call your doctor now or seek immediate medical care if:  · You have new or worse belly pain. · You have new or worse nausea or vomiting. · You have blood in your stools. Watch closely for changes in your health, and be sure to contact your doctor if:  · Your constipation is getting worse. · You do not get better as expected. Where can you learn more? Go to http://marv-uvaldo.info/. Enter 21 [id number] in the search box to learn more about \"Constipation: Care Instructions. \"  Current as of: May 27, 2016  Content Version: 11.1  © 3999-7013 [employer]. Care instructions adapted under license by [employer] (which disclaims liability or warranty for this information). If you have questions about a medical condition or this instruction, always ask your healthcare professional. [address] any warranty or liability for your use of this information.

## 2018-04-09 DIAGNOSIS — G37.3 ACUTE TRANSVERSE MYELITIS IN DEMYELINATING DISEASE OF CENTRAL NERVOUS SYSTEM (HCC): ICD-10-CM

## 2018-04-09 DIAGNOSIS — G35 MULTIPLE SCLEROSIS (HCC): ICD-10-CM

## 2018-04-09 RX ORDER — GLATIRAMER ACETATE 40 MG/ML
40 INJECTION, SOLUTION SUBCUTANEOUS 3 TIMES WEEKLY
Qty: 12 SYRINGE | Refills: 6 | Status: SHIPPED | OUTPATIENT
Start: 2018-04-09 | End: 2018-09-20 | Stop reason: DRUGHIGH

## 2018-04-09 RX ORDER — GABAPENTIN 300 MG/1
600 CAPSULE ORAL 3 TIMES DAILY
Qty: 200 CAP | Refills: 5 | Status: SHIPPED | OUTPATIENT
Start: 2018-04-09 | End: 2018-06-29 | Stop reason: SDUPTHER

## 2018-05-29 ENCOUNTER — OFFICE VISIT (OUTPATIENT)
Dept: FAMILY MEDICINE CLINIC | Age: 48
End: 2018-05-29

## 2018-05-29 VITALS
RESPIRATION RATE: 18 BRPM | DIASTOLIC BLOOD PRESSURE: 82 MMHG | HEIGHT: 66 IN | OXYGEN SATURATION: 98 % | HEART RATE: 62 BPM | BODY MASS INDEX: 24.35 KG/M2 | WEIGHT: 151.5 LBS | SYSTOLIC BLOOD PRESSURE: 130 MMHG | TEMPERATURE: 98 F

## 2018-05-29 DIAGNOSIS — G35 MULTIPLE SCLEROSIS (HCC): ICD-10-CM

## 2018-05-29 DIAGNOSIS — E66.3 OVERWEIGHT (BMI 25.0-29.9): ICD-10-CM

## 2018-05-29 DIAGNOSIS — E78.49 OTHER HYPERLIPIDEMIA: ICD-10-CM

## 2018-05-29 DIAGNOSIS — E16.1 HYPERINSULINEMIA: Primary | ICD-10-CM

## 2018-05-29 NOTE — PATIENT INSTRUCTIONS
Movement Disorders: Exercises  Your Care Instructions  Here are some examples of exercises for problems with movement. Your doctor or physical therapist will tell you when you can start these exercises and which ones will work best for you. Problems with movement, or movement disorders, can happen along with many conditions, such as multiple sclerosis, Parkinson's disease, or damage from a stroke. No matter what is making movement hard for you, these exercises can help you be more flexible, strong, and steady on your feet. Start each exercise slowly. Ease off the exercise if you start to have pain. How to do the exercises  Prayer stretch    1. Start with your palms together in front of your chest, just below your chin. 2. Slowly lower your hands toward your waistline, keeping your hands close to your stomach and your palms together until you feel a mild to moderate stretch under your forearms. 3. Hold for at least 15 to 30 seconds. Repeat 2 to 4 times. Shoulder stretch    1.  a doorway, and place one arm against the door frame. Your elbow should be a little higher than your shoulder. 2. Relax your shoulders as you lean forward, allowing your chest and shoulder muscles to stretch. You can also turn your body slightly away from your arm to stretch the muscles even more. 3. Hold for 15 to 30 seconds. 4. Repeat 2 to 4 times with each arm. Calf stretch    1. Place your hands on a wall for balance. You can also do this with your hands on the back of a chair or countertop. 2. Step back with your right leg. Keep the leg straight, and press your right heel into the floor. 3. Press your hips forward, bending your left leg slightly. You will feel the stretch in your right calf. 4. Hold the stretch 15 to 30 seconds. 5. Repeat 2 to 4 times with each leg. Hip flexor stretch (edge of table)    1.  Lie flat on your back on a table or flat bench, with your knees and lower legs hanging off the edge of the table.  2. Grab one leg at the knee, and pull that knee back toward your chest. Relax the other leg. Let it hang down toward the floor until you feel a stretch in the upper thigh of that leg and hip. 3. Hold the stretch for at least 15 to 30 seconds. 4. Repeat 2 to 4 times for each leg. Back stretches    1. Get down on your hands and knees on the floor. 2. Relax your head, and allow it to droop. Round your back up toward the ceiling until you feel a nice stretch in your upper, middle, and lower back. Hold this stretch for as long as it feels comfortable, or about 15 to 30 seconds. 3. Return to the starting position with a flat back while you are on your hands and knees. 4. Let your back sway by pressing your stomach toward the floor. Lift your buttocks toward the ceiling. 5. Hold this position for 15 to 30 seconds. 6. Repeat 2 to 4 times. Midback stretch    If you have knee pain, do not do this exercise. 1. Kneel on the floor, and sit back on your ankles. 2. Lean forward, place your hands on the floor, and stretch your arms out in front of you. Rest your head between your arms. 3. Gently push your chest toward the floor, reaching as far in front of you as you can. 4. Hold for 15 to 30 seconds. 5. Repeat 2 to 4 times. Press-up stretch    1. Lie on your stomach, supporting your body with your forearms. 2. Press your elbows down into the floor to raise your upper back. As you do this, relax your stomach muscles and allow your back to arch without using your back muscles. As you press up, do not let your hips or pelvis come off the floor. 3. Hold for 15 to 30 seconds, then relax. 4. Repeat 2 to 4 times. Chest and shoulder stretches    1. Put a few towels on top of one another and roll them together lengthwise. 2. Lie down, and place the roll of towels along the bones of your spine from your neck to your tailbone. Or lie on a foam roller if you have one.   3. Make sure that your head and tailbone area are supported with the roll of towels or on the foam roller. Be sure the towel roll or foam roller is in line with your spine. 4. Bend your knees to support your lower back. 5. Hold this position while you move your arms into the following positions:  1. Arms down at your sides, with the palms facing up. 2. Arms out to your sides in a \"T\" shape. 3. Arms out to your sides with your elbows bent to 90 degrees, as in a \"goalpost\" shape. 4. Arms stretched over your head. 6. Hold each arm position for 15 to 30 seconds. 7. Repeat the entire cycle of arm movements 2 to 4 times. Single knee-to-chest stretch    1. Lie on your back with your knees bent and your feet flat on the floor. You can put a small pillow under your head and neck if it is more comfortable. 2. Bring one knee to your chest, keeping the other foot flat on the floor. 3. Keep your lower back pressed to the floor. Hold for 15 to 30 seconds. 4. Relax, and lower the knee to the starting position. 5. Repeat with the other leg. Repeat 2 to 4 times with each leg. 6. To get more stretch, keep your other leg flat on the floor while pulling your knee to your chest.  Hip rotator stretch    1. Lie on your back with both knees bent and your feet flat on the floor. 2. Put the ankle of one leg on your opposite thigh near your knee. 3. Use your hand to gently push the raised knee away from your body until you feel a gentle stretch around your hip. 4. Hold the stretch for 15 to 30 seconds. 5. Repeat 2 to 4 times with each leg. Hamstring wall stretch    1. Lie on your back in a doorway, with your lower legs through the open door. 2. Slide the leg next to the doorway up the wall to straighten your knee. You should feel a gentle stretch down the back of your leg. 1. Do not arch your back. 2. Do not bend either knee. 3. Keep one heel touching the floor and the other heel touching the wall. Do not point your toes.   3. Hold the stretch for at least 1 minute to start. As you get used to it, work toward holding the stretch for as long as 6 minutes. 4. Do this exercise 2 to 4 times with one leg. Then move to the other side of the doorway to stretch the other leg. Hand flips for coordination    1. While seated, place your forearm and wrist on your thigh, palm down. 2. Flip your hand over so the back of your hand rests on your thigh and your palm is up. Alternate between palm up and palm down while keeping your forearm on your thigh. 3. Repeat 8 to 12 times with each hand. Finger opposition for coordination    1. With one hand, point your fingers and thumb straight up. Your wrist should be relaxed, following the line of your fingers and thumb. 2. Touch your thumb to each finger, one finger at a time. This will look like an \"okay\" sign, but try to keep your other fingers straight and pointing upward as much as you can. 3. Repeat 8 to 12 times with each hand. Finger extension for coordination    1. Place your hand flat on a table. 2. Lift and then lower one finger at a time off the table. 3. Repeat 8 to 12 times with each hand. Shoulder blade squeeze for strength    1. Stand with your arms at your sides, and squeeze your shoulder blades together. Do not raise your shoulders up as you squeeze. 2. Hold 6 seconds. 3. Repeat 8 to 12 times. Bridging for strength    1. Lie on your back with both knees bent. Your knees should be bent about 90 degrees. 2. Then push your feet into the floor, squeeze your buttocks, and lift your hips off the floor until your shoulders, hips, and knees are all in a straight line. 3. Hold for about 6 seconds as you continue to breathe normally. 4. Slowly lower your hips back down to the floor. Rest for up to 10 seconds. 5. Repeat 8 to 12 times. Single-leg balance    1. Stand on a flat surface with your arms stretched out to your sides like you are making the letter \"T. \" Then lift one leg off the floor, bending it at the knee.  If you are not steady on your feet, use one hand to hold on to a chair, counter, or wall. 2. Keep your standing knee straight. Try to balance on that leg for up to 30 seconds. Then rest for up to 10 seconds. 3. Repeat 6 to 8 times with each leg. 4. When you can balance on one leg for 30 seconds with your eyes open, try to balance on it with your eyes closed. 5. When you can do this exercise with your eyes closed for 30 seconds and with ease and no pain, try it while standing on a pillow or piece of foam.  Alternate arm and leg (bird dog) balance    Do this exercise slowly. Try to keep your body straight at all times. 1. Start on the floor, on your hands and knees. 2. Tighten your belly muscles by pulling your belly button in toward your spine. Be sure you continue to breathe normally and do not hold your breath. 3. Raise one arm off the floor, and hold it straight out in front of you. Be careful not to let your shoulder drop down, because that will twist your trunk. 4. Hold for about 6 seconds, then lower your arm and switch to your other arm. 5. Repeat 8 to 12 times with each arm. 6. When you can do this exercise with ease and no pain, try it with one leg raised off the floor. Hold your leg straight out behind you. Be careful not to let your hip drop down, because that will twist your trunk. 7. When holding your leg straight out becomes easier, try raising your opposite arm at the same time. Repeat steps 1 through 5. Balance-building exercise 1    1. Stand with a chair in front of you and a wall behind you, in case you lose your balance. 2. Stand with your feet together and your arms at your sides. 3. Move your head up and down 10 times. Balance-building exercise 2    1. Turn your head side to side 10 times. Balance-building exercise 3    1. Move your head diagonally up and down 10 times. Balance-building exercise 4    1. Move your head diagonally up and down 10 times on the other side.   Follow-up care is a key part of your treatment and safety. Be sure to make and go to all appointments, and call your doctor if you are having problems. It's also a good idea to know your test results and keep a list of the medicines you take. Where can you learn more? Go to http://marv-uvaldo.info/. Enter J721 in the search box to learn more about \"Movement Disorders: Exercises. \"  Current as of: October 14, 2016  Content Version: 11.4  © 0760-8746 [employer]. Care instructions adapted under license by [employer] (which disclaims liability or warranty for this information). If you have questions about a medical condition or this instruction, always ask your healthcare professional. [address] any warranty or liability for your use of this information. Movement Disorders: Exercises  Your Care Instructions  Here are some examples of exercises for problems with movement. Your doctor or physical therapist will tell you when you can start these exercises and which ones will work best for you. Problems with movement, or movement disorders, can happen along with many conditions, such as multiple sclerosis, Parkinson's disease, or damage from a stroke. No matter what is making movement hard for you, these exercises can help you be more flexible, strong, and steady on your feet. Start each exercise slowly. Ease off the exercise if you start to have pain. How to do the exercises  Prayer stretch    4. Start with your palms together in front of your chest, just below your chin. 5. Slowly lower your hands toward your waistline, keeping your hands close to your stomach and your palms together until you feel a mild to moderate stretch under your forearms. 6. Hold for at least 15 to 30 seconds. Repeat 2 to 4 times. Shoulder stretch    5.  a doorway, and place one arm against the door frame. Your elbow should be a little higher than your shoulder.   6. Relax your shoulders as you lean forward, allowing your chest and shoulder muscles to stretch. You can also turn your body slightly away from your arm to stretch the muscles even more. 7. Hold for 15 to 30 seconds. 8. Repeat 2 to 4 times with each arm. Calf stretch    6. Place your hands on a wall for balance. You can also do this with your hands on the back of a chair or countertop. 7. Step back with your right leg. Keep the leg straight, and press your right heel into the floor. 8. Press your hips forward, bending your left leg slightly. You will feel the stretch in your right calf. 9. Hold the stretch 15 to 30 seconds. 10. Repeat 2 to 4 times with each leg. Hip flexor stretch (edge of table)    5. Lie flat on your back on a table or flat bench, with your knees and lower legs hanging off the edge of the table. 6. Grab one leg at the knee, and pull that knee back toward your chest. Relax the other leg. Let it hang down toward the floor until you feel a stretch in the upper thigh of that leg and hip. 7. Hold the stretch for at least 15 to 30 seconds. 8. Repeat 2 to 4 times for each leg. Back stretches    7. Get down on your hands and knees on the floor. 8. Relax your head, and allow it to droop. Round your back up toward the ceiling until you feel a nice stretch in your upper, middle, and lower back. Hold this stretch for as long as it feels comfortable, or about 15 to 30 seconds. 9. Return to the starting position with a flat back while you are on your hands and knees. 10. Let your back sway by pressing your stomach toward the floor. Lift your buttocks toward the ceiling. 11. Hold this position for 15 to 30 seconds. 12. Repeat 2 to 4 times. Midback stretch    If you have knee pain, do not do this exercise. 6. Kneel on the floor, and sit back on your ankles. 7. Lean forward, place your hands on the floor, and stretch your arms out in front of you. Rest your head between your arms.   8. Gently push your chest toward the floor, reaching as far in front of you as you can. 9. Hold for 15 to 30 seconds. 10. Repeat 2 to 4 times. Press-up stretch    5. Lie on your stomach, supporting your body with your forearms. 6. Press your elbows down into the floor to raise your upper back. As you do this, relax your stomach muscles and allow your back to arch without using your back muscles. As you press up, do not let your hips or pelvis come off the floor. 7. Hold for 15 to 30 seconds, then relax. 8. Repeat 2 to 4 times. Chest and shoulder stretches    8. Put a few towels on top of one another and roll them together lengthwise. 9. Lie down, and place the roll of towels along the bones of your spine from your neck to your tailbone. Or lie on a foam roller if you have one. 10. Make sure that your head and tailbone area are supported with the roll of towels or on the foam roller. Be sure the towel roll or foam roller is in line with your spine. [address] your knees to support your lower back. 12. Hold this position while you move your arms into the following positions:  1. Arms down at your sides, with the palms facing up. 2. Arms out to your sides in a \"T\" shape. 3. Arms out to your sides with your elbows bent to 90 degrees, as in a \"goalpost\" shape. 4. Arms stretched over your head. 13. Hold each arm position for 15 to 30 seconds. 14. Repeat the entire cycle of arm movements 2 to 4 times. Single knee-to-chest stretch    7. Lie on your back with your knees bent and your feet flat on the floor. You can put a small pillow under your head and neck if it is more comfortable. 8. Bring one knee to your chest, keeping the other foot flat on the floor. 9. Keep your lower back pressed to the floor. Hold for 15 to 30 seconds. 10. Relax, and lower the knee to the starting position. 11. Repeat with the other leg. Repeat 2 to 4 times with each leg.   12. To get more stretch, keep your other leg flat on the floor while pulling your knee to your chest.  Hip rotator stretch    6. Lie on your back with both knees bent and your feet flat on the floor. 7. Put the ankle of one leg on your opposite thigh near your knee. 8. Use your hand to gently push the raised knee away from your body until you feel a gentle stretch around your hip. 9. Hold the stretch for 15 to 30 seconds. 10. Repeat 2 to 4 times with each leg. Hamstring wall stretch    5. Lie on your back in a doorway, with your lower legs through the open door. 6. Slide the leg next to the doorway up the wall to straighten your knee. You should feel a gentle stretch down the back of your leg. 1. Do not arch your back. 2. Do not bend either knee. 3. Keep one heel touching the floor and the other heel touching the wall. Do not point your toes. 7. Hold the stretch for at least 1 minute to start. As you get used to it, work toward holding the stretch for as long as 6 minutes. 8. Do this exercise 2 to 4 times with one leg. Then move to the other side of the doorway to stretch the other leg. Hand flips for coordination    4. While seated, place your forearm and wrist on your thigh, palm down. 5. Flip your hand over so the back of your hand rests on your thigh and your palm is up. Alternate between palm up and palm down while keeping your forearm on your thigh. 6. Repeat 8 to 12 times with each hand. Finger opposition for coordination    4. With one hand, point your fingers and thumb straight up. Your wrist should be relaxed, following the line of your fingers and thumb. 5. Touch your thumb to each finger, one finger at a time. This will look like an \"okay\" sign, but try to keep your other fingers straight and pointing upward as much as you can. 6. Repeat 8 to 12 times with each hand. Finger extension for coordination    4. Place your hand flat on a table. 5. Lift and then lower one finger at a time off the table. 6. Repeat 8 to 12 times with each hand.   Shoulder blade squeeze for strength    4. Stand with your arms at your sides, and squeeze your shoulder blades together. Do not raise your shoulders up as you squeeze. 5. Hold 6 seconds. 6. Repeat 8 to 12 times. Bridging for strength    6. Lie on your back with both knees bent. Your knees should be bent about 90 degrees. 7. Then push your feet into the floor, squeeze your buttocks, and lift your hips off the floor until your shoulders, hips, and knees are all in a straight line. 8. Hold for about 6 seconds as you continue to breathe normally. 9. Slowly lower your hips back down to the floor. Rest for up to 10 seconds. 10. Repeat 8 to 12 times. Single-leg balance    6. Stand on a flat surface with your arms stretched out to your sides like you are making the letter \"T. \" Then lift one leg off the floor, bending it at the knee. If you are not steady on your feet, use one hand to hold on to a chair, counter, or wall. 7. Keep your standing knee straight. Try to balance on that leg for up to 30 seconds. Then rest for up to 10 seconds. 8. Repeat 6 to 8 times with each leg. 9. When you can balance on one leg for 30 seconds with your eyes open, try to balance on it with your eyes closed. 10. When you can do this exercise with your eyes closed for 30 seconds and with ease and no pain, try it while standing on a pillow or piece of foam.  Alternate arm and leg (bird dog) balance    Do this exercise slowly. Try to keep your body straight at all times. 8. Start on the floor, on your hands and knees. 9. Tighten your belly muscles by pulling your belly button in toward your spine. Be sure you continue to breathe normally and do not hold your breath. 10. Raise one arm off the floor, and hold it straight out in front of you. Be careful not to let your shoulder drop down, because that will twist your trunk. 11. Hold for about 6 seconds, then lower your arm and switch to your other arm. 12. Repeat 8 to 12 times with each arm.   13. When you can do this exercise with ease and no pain, try it with one leg raised off the floor. Hold your leg straight out behind you. Be careful not to let your hip drop down, because that will twist your trunk. 14. When holding your leg straight out becomes easier, try raising your opposite arm at the same time. Repeat steps 1 through 5. Balance-building exercise 1    4. Stand with a chair in front of you and a wall behind you, in case you lose your balance. 5. Stand with your feet together and your arms at your sides. 6. Move your head up and down 10 times. Balance-building exercise 2    2. Turn your head side to side 10 times. Balance-building exercise 3    2. Move your head diagonally up and down 10 times. Balance-building exercise 4    2. Move your head diagonally up and down 10 times on the other side. Follow-up care is a key part of your treatment and safety. Be sure to make and go to all appointments, and call your doctor if you are having problems. It's also a good idea to know your test results and keep a list of the medicines you take. Where can you learn more? Go to http://marv-uvaldo.info/. Enter U656 in the search box to learn more about \"Movement Disorders: Exercises. \"  Current as of: October 14, 2016  Content Version: 11.4  © 6062-3386 [employer]. Care instructions adapted under license by [employer] (which disclaims liability or warranty for this information). If you have questions about a medical condition or this instruction, always ask your healthcare professional. [place] [address] any warranty or liability for your use of this information.

## 2018-05-29 NOTE — PROGRESS NOTES
1. Have you been to the ER, urgent care clinic since your last visit? Hospitalized since your last visit? No    2. Have you seen or consulted any other health care providers outside of the [address] [place] [address] since your last visit? Include any pap smears or colon screening.  No      Chief Complaint   Patient presents with    Weight Management     Body Weight: 151.5  Body Fat%: 28.8  Muscle Mass Weight: 24.3  Body Water Weight: 84.8  Basal Metabolic Rate: 8473  BMI: 24.45

## 2018-05-29 NOTE — MR AVS SNAPSHOT
[address] [facility] [address] 
[phone] Patient: Jessica Mclain MRN: [id number] :1970 Visit Information Date & Time Provider Department Dept. Phone Encounter #  
 2018  4:00 PM Janice Wray MD [facility] [phone] [id number] Upcoming Health Maintenance Date Due DTaP/Tdap/Td series (1 - Tdap) 1993 PAP AKA CERVICAL CYTOLOGY 1993 Influenza Age 5 to Adult 2018 Allergies as of 2018  Review Complete On: 2018 By: Janice Wray MD  
 No Known Allergies Current Immunizations  Reviewed on 11/3/2017 Name Date Influenza Vaccine 10/1/2017 Not reviewed this visit You Were Diagnosed With   
  
 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 9)     ICD-10-CM: D71.2 ICD-9-CM: 278.02   
 Multiple sclerosis ([facility] [address]     ICD-10-CM: G35 
ICD-9-CM: 751 Vitals BP Pulse Temp Resp Height(growth percentile) Weight(growth percentile) 130/82 62 98 °F (36.7 °C) (Oral) 18 5' 6\" (1.676 m) 151 lb 8 oz (68.7 kg) LMP SpO2 BMI OB Status Smoking Status 05/15/2018 98% 24.45 kg/m2 Having regular periods Never Smoker Vitals History BMI and BSA Data Body Mass Index Body Surface Area  
 24.45 kg/m 2 1.79 m 2 Preferred Pharmacy Pharmacy Name Phone [id number] [address] [facility], [address] [phone] Your Updated Medication List  
  
   
This list is accurate as of 18  4:36 PM.  Always use your most recent med list.  
  
  
  
  
 albuterol 90 mcg/actuation inhaler Commonly known as:  PROVENTIL HFA, VENTOLIN HFA, PROAIR HFA Take 2 Puffs by inhalation every four (4) hours as needed. B COMPLEX 1 tablet Generic drug:  b complex vitamins Take 1 Tab by mouth daily. COPAXONE 40 mg/mL injection Generic drug:  glatiramer 40 mg by SubCUTAneous route Three (3) times a week. Brand Medically Necessary  
  
 fluticasone 50 mcg/actuation nasal spray Commonly known as:  FLONASE  
2 sprays each nostril one time daily for 30 days. gabapentin 300 mg capsule Commonly known as:  NEURONTIN Take 2 Caps by mouth three (3) times daily. NexIUM 20 mg capsule Generic drug:  esomeprazole Take  by mouth daily. VITAMIN D3 2,000 unit Tab Generic drug:  cholecalciferol (vitamin D3) Take  by mouth. Patient Instructions Movement Disorders: Exercises Your Care Instructions Here are some examples of exercises for problems with movement. Your doctor or physical therapist will tell you when you can start these exercises and which ones will work best for you. Problems with movement, or movement disorders, can happen along with many conditions, such as multiple sclerosis, Parkinson's disease, or damage from a stroke. No matter what is making movement hard for you, these exercises can help you be more flexible, strong, and steady on your feet. Start each exercise slowly. Ease off the exercise if you start to have pain. How to do the exercises Prayer stretch 1. Start with your palms together in front of your chest, just below your chin. 2. Slowly lower your hands toward your waistline, keeping your hands close to your stomach and your palms together until you feel a mild to moderate stretch under your forearms. 3. Hold for at least 15 to 30 seconds. Repeat 2 to 4 times. Shoulder stretch 1.  a doorway, and place one arm against the door frame. Your elbow should be a little higher than your shoulder. 2. Relax your shoulders as you lean forward, allowing your chest and shoulder muscles to stretch. You can also turn your body slightly away from your arm to stretch the muscles even more. 3. Hold for 15 to 30 seconds. 4. Repeat 2 to 4 times with each arm. Calf stretch 1. Place your hands on a wall for balance. You can also do this with your hands on the back of a chair or countertop. 2. Step back with your right leg. Keep the leg straight, and press your right heel into the floor. 3. Press your hips forward, bending your left leg slightly. You will feel the stretch in your right calf. 4. Hold the stretch 15 to 30 seconds. 5. Repeat 2 to 4 times with each leg. Hip flexor stretch (edge of table) 1. Lie flat on your back on a table or flat bench, with your knees and lower legs hanging off the edge of the table. 2. Grab one leg at the knee, and pull that knee back toward your chest. Relax the other leg. Let it hang down toward the floor until you feel a stretch in the upper thigh of that leg and hip. 3. Hold the stretch for at least 15 to 30 seconds. 4. Repeat 2 to 4 times for each leg. Back stretches 1. Get down on your hands and knees on the floor. 2. Relax your head, and allow it to droop. Round your back up toward the ceiling until you feel a nice stretch in your upper, middle, and lower back. Hold this stretch for as long as it feels comfortable, or about 15 to 30 seconds. 3. Return to the starting position with a flat back while you are on your hands and knees. 4. Let your back sway by pressing your stomach toward the floor. Lift your buttocks toward the ceiling. 5. Hold this position for 15 to 30 seconds. 6. Repeat 2 to 4 times. Midback stretch If you have knee pain, do not do this exercise. 1. Kneel on the floor, and sit back on your ankles. 2. Lean forward, place your hands on the floor, and stretch your arms out in front of you. Rest your head between your arms. 3. Gently push your chest toward the floor, reaching as far in front of you as you can. 4. Hold for 15 to 30 seconds. 5. Repeat 2 to 4 times. Press-up stretch 1. Lie on your stomach, supporting your body with your forearms. 2. Press your elbows down into the floor to raise your upper back. As you do this, relax your stomach muscles and allow your back to arch without using your back muscles. As you press up, do not let your hips or pelvis come off the floor. 3. Hold for 15 to 30 seconds, then relax. 4. Repeat 2 to 4 times. Chest and shoulder stretches 1. Put a few towels on top of one another and roll them together lengthwise. 2. Lie down, and place the roll of towels along the bones of your spine from your neck to your tailbone. Or lie on a foam roller if you have one. 3. Make sure that your head and tailbone area are supported with the roll of towels or on the foam roller. Be sure the towel roll or foam roller is in line with your spine. 4. Bend your knees to support your lower back. 5. Hold this position while you move your arms into the following positions: 1. Arms down at your sides, with the palms facing up. 2. Arms out to your sides in a \"T\" shape. 3. Arms out to your sides with your elbows bent to 90 degrees, as in a \"goalpost\" shape. 4. Arms stretched over your head. 6. Hold each arm position for 15 to 30 seconds. 7. Repeat the entire cycle of arm movements 2 to 4 times. Single knee-to-chest stretch 1. Lie on your back with your knees bent and your feet flat on the floor. You can put a small pillow under your head and neck if it is more comfortable. 2. Bring one knee to your chest, keeping the other foot flat on the floor. 3. Keep your lower back pressed to the floor. Hold for 15 to 30 seconds. 4. Relax, and lower the knee to the starting position. 5. Repeat with the other leg. Repeat 2 to 4 times with each leg. 6. To get more stretch, keep your other leg flat on the floor while pulling your knee to your chest. 
Hip rotator stretch 1. Lie on your back with both knees bent and your feet flat on the floor. 2. Put the ankle of one leg on your opposite thigh near your knee. 3. Use your hand to gently push the raised knee away from your body until you feel a gentle stretch around your hip. 4. Hold the stretch for 15 to 30 seconds. 5. Repeat 2 to 4 times with each leg. Hamstring wall stretch 1. Lie on your back in a doorway, with your lower legs through the open door. 2. Slide the leg next to the doorway up the wall to straighten your knee. You should feel a gentle stretch down the back of your leg. 1. Do not arch your back. 2. Do not bend either knee. 3. Keep one heel touching the floor and the other heel touching the wall. Do not point your toes. 3. Hold the stretch for at least 1 minute to start. As you get used to it, work toward holding the stretch for as long as 6 minutes. 4. Do this exercise 2 to 4 times with one leg. Then move to the other side of the doorway to stretch the other leg. Hand flips for coordination 1. While seated, place your forearm and wrist on your thigh, palm down. 2. Flip your hand over so the back of your hand rests on your thigh and your palm is up. Alternate between palm up and palm down while keeping your forearm on your thigh. 3. Repeat 8 to 12 times with each hand. Finger opposition for coordination 1. With one hand, point your fingers and thumb straight up. Your wrist should be relaxed, following the line of your fingers and thumb. 2. Touch your thumb to each finger, one finger at a time. This will look like an \"okay\" sign, but try to keep your other fingers straight and pointing upward as much as you can. 3. Repeat 8 to 12 times with each hand. Finger extension for coordination 1. Place your hand flat on a table. 2. Lift and then lower one finger at a time off the table. 3. Repeat 8 to 12 times with each hand. Shoulder blade squeeze for strength 1.  Stand with your arms at your sides, and squeeze your shoulder blades together. Do not raise your shoulders up as you squeeze. 2. Hold 6 seconds. 3. Repeat 8 to 12 times. Bridging for strength 1. Lie on your back with both knees bent. Your knees should be bent about 90 degrees. 2. Then push your feet into the floor, squeeze your buttocks, and lift your hips off the floor until your shoulders, hips, and knees are all in a straight line. 3. Hold for about 6 seconds as you continue to breathe normally. 4. Slowly lower your hips back down to the floor. Rest for up to 10 seconds. 5. Repeat 8 to 12 times. Single-leg balance 1. Stand on a flat surface with your arms stretched out to your sides like you are making the letter \"T. \" Then lift one leg off the floor, bending it at the knee. If you are not steady on your feet, use one hand to hold on to a chair, counter, or wall. 2. Keep your standing knee straight. Try to balance on that leg for up to 30 seconds. Then rest for up to 10 seconds. 3. Repeat 6 to 8 times with each leg. 4. When you can balance on one leg for 30 seconds with your eyes open, try to balance on it with your eyes closed. 5. When you can do this exercise with your eyes closed for 30 seconds and with ease and no pain, try it while standing on a pillow or piece of foam. 
Alternate arm and leg (bird dog) balance Do this exercise slowly. Try to keep your body straight at all times. 1. Start on the floor, on your hands and knees. 2. Tighten your belly muscles by pulling your belly button in toward your spine. Be sure you continue to breathe normally and do not hold your breath. 3. Raise one arm off the floor, and hold it straight out in front of you. Be careful not to let your shoulder drop down, because that will twist your trunk. 4. Hold for about 6 seconds, then lower your arm and switch to your other arm. 5. Repeat 8 to 12 times with each arm.  
6. When you can do this exercise with ease and no pain, try it with one leg raised off the floor. Hold your leg straight out behind you. Be careful not to let your hip drop down, because that will twist your trunk. 7. When holding your leg straight out becomes easier, try raising your opposite arm at the same time. Repeat steps 1 through 5. Balance-building exercise 1 1. Stand with a chair in front of you and a wall behind you, in case you lose your balance. 2. Stand with your feet together and your arms at your sides. 3. Move your head up and down 10 times. Balance-building exercise 2 1. Turn your head side to side 10 times. Balance-building exercise 3 1. Move your head diagonally up and down 10 times. Balance-building exercise 4 1. Move your head diagonally up and down 10 times on the other side. Follow-up care is a key part of your treatment and safety. Be sure to make and go to all appointments, and call your doctor if you are having problems. It's also a good idea to know your test results and keep a list of the medicines you take. Where can you learn more? Go to http://marv-uvaldo.info/. Enter Y209 in the search box to learn more about \"Movement Disorders: Exercises. \" Current as of: October 14, 2016 Content Version: 11.4 © 1242-7829 Healthwise, Incorporated. Care instructions adapted under license by [employer] (which disclaims liability or warranty for this information). If you have questions about a medical condition or this instruction, always ask your healthcare professional. [place] [address] any warranty or liability for your use of this information. Movement Disorders: Exercises Your Care Instructions Here are some examples of exercises for problems with movement. Your doctor or physical therapist will tell you when you can start these exercises and which ones will work best for you.  
Problems with movement, or movement disorders, can happen along with many conditions, such as multiple sclerosis, Parkinson's disease, or damage from a stroke. No matter what is making movement hard for you, these exercises can help you be more flexible, strong, and steady on your feet. Start each exercise slowly. Ease off the exercise if you start to have pain. How to do the exercises Prayer stretch 4. Start with your palms together in front of your chest, just below your chin. 5. Slowly lower your hands toward your waistline, keeping your hands close to your stomach and your palms together until you feel a mild to moderate stretch under your forearms. 6. Hold for at least 15 to 30 seconds. Repeat 2 to 4 times. Shoulder stretch 5.  a doorway, and place one arm against the door frame. Your elbow should be a little higher than your shoulder. 6. Relax your shoulders as you lean forward, allowing your chest and shoulder muscles to stretch. You can also turn your body slightly away from your arm to stretch the muscles even more. 7. Hold for 15 to 30 seconds. 8. Repeat 2 to 4 times with each arm. Calf stretch 6. Place your hands on a wall for balance. You can also do this with your hands on the back of a chair or countertop. 7. Step back with your right leg. Keep the leg straight, and press your right heel into the floor. 8. Press your hips forward, bending your left leg slightly. You will feel the stretch in your right calf. 9. Hold the stretch 15 to 30 seconds. 10. Repeat 2 to 4 times with each leg. Hip flexor stretch (edge of table) 5. Lie flat on your back on a table or flat bench, with your knees and lower legs hanging off the edge of the table. 6. Grab one leg at the knee, and pull that knee back toward your chest. Relax the other leg. Let it hang down toward the floor until you feel a stretch in the upper thigh of that leg and hip. 7. Hold the stretch for at least 15 to 30 seconds. 8. Repeat 2 to 4 times for each leg. Back stretches 7. Get down on your hands and knees on the floor. 8. Relax your head, and allow it to droop. Round your back up toward the ceiling until you feel a nice stretch in your upper, middle, and lower back. Hold this stretch for as long as it feels comfortable, or about 15 to 30 seconds. 9. Return to the starting position with a flat back while you are on your hands and knees. 10. Let your back sway by pressing your stomach toward the floor. Lift your buttocks toward the ceiling. 11. Hold this position for 15 to 30 seconds. 12. Repeat 2 to 4 times. Midback stretch If you have knee pain, do not do this exercise. 6. Kneel on the floor, and sit back on your ankles. 7. Lean forward, place your hands on the floor, and stretch your arms out in front of you. Rest your head between your arms. 8. Gently push your chest toward the floor, reaching as far in front of you as you can. 9. Hold for 15 to 30 seconds. 10. Repeat 2 to 4 times. Press-up stretch 5. Lie on your stomach, supporting your body with your forearms. 6. Press your elbows down into the floor to raise your upper back. As you do this, relax your stomach muscles and allow your back to arch without using your back muscles. As you press up, do not let your hips or pelvis come off the floor. 7. Hold for 15 to 30 seconds, then relax. 8. Repeat 2 to 4 times. Chest and shoulder stretches 8. Put a few towels on top of one another and roll them together lengthwise. 9. Lie down, and place the roll of towels along the bones of your spine from your neck to your tailbone. Or lie on a foam roller if you have one. 10. Make sure that your head and tailbone area are supported with the roll of towels or on the foam roller. Be sure the towel roll or foam roller is in line with your spine. [address] your knees to support your lower back. 12. Hold this position while you move your arms into the following positions: 1. Arms down at your sides, with the palms facing up. 2. Arms out to your sides in a \"T\" shape. 3. Arms out to your sides with your elbows bent to 90 degrees, as in a \"goalpost\" shape. 4. Arms stretched over your head. 13. Hold each arm position for 15 to 30 seconds. 14. Repeat the entire cycle of arm movements 2 to 4 times. Single knee-to-chest stretch 7. Lie on your back with your knees bent and your feet flat on the floor. You can put a small pillow under your head and neck if it is more comfortable. 8. Bring one knee to your chest, keeping the other foot flat on the floor. 9. Keep your lower back pressed to the floor. Hold for 15 to 30 seconds. 10. Relax, and lower the knee to the starting position. 11. Repeat with the other leg. Repeat 2 to 4 times with each leg. 12. To get more stretch, keep your other leg flat on the floor while pulling your knee to your chest. 
Hip rotator stretch 6. Lie on your back with both knees bent and your feet flat on the floor. 7. Put the ankle of one leg on your opposite thigh near your knee. 8. Use your hand to gently push the raised knee away from your body until you feel a gentle stretch around your hip. 9. Hold the stretch for 15 to 30 seconds. 10. Repeat 2 to 4 times with each leg. Hamstring wall stretch 5. Lie on your back in a doorway, with your lower legs through the open door. 6. Slide the leg next to the doorway up the wall to straighten your knee. You should feel a gentle stretch down the back of your leg. 1. Do not arch your back. 2. Do not bend either knee. 3. Keep one heel touching the floor and the other heel touching the wall. Do not point your toes. 7. Hold the stretch for at least 1 minute to start. As you get used to it, work toward holding the stretch for as long as 6 minutes. 8. Do this exercise 2 to 4 times with one leg. Then move to the other side of the doorway to stretch the other leg. Hand flips for coordination 4. While seated, place your forearm and wrist on your thigh, palm down. 5. Flip your hand over so the back of your hand rests on your thigh and your palm is up. Alternate between palm up and palm down while keeping your forearm on your thigh. 6. Repeat 8 to 12 times with each hand. Finger opposition for coordination 4. With one hand, point your fingers and thumb straight up. Your wrist should be relaxed, following the line of your fingers and thumb. 5. Touch your thumb to each finger, one finger at a time. This will look like an \"okay\" sign, but try to keep your other fingers straight and pointing upward as much as you can. 6. Repeat 8 to 12 times with each hand. Finger extension for coordination 4. Place your hand flat on a table. 5. Lift and then lower one finger at a time off the table. 6. Repeat 8 to 12 times with each hand. Shoulder blade squeeze for strength 4. Stand with your arms at your sides, and squeeze your shoulder blades together. Do not raise your shoulders up as you squeeze. 5. Hold 6 seconds. 6. Repeat 8 to 12 times. Bridging for strength 6. Lie on your back with both knees bent. Your knees should be bent about 90 degrees. 7. Then push your feet into the floor, squeeze your buttocks, and lift your hips off the floor until your shoulders, hips, and knees are all in a straight line. 8. Hold for about 6 seconds as you continue to breathe normally. 9. Slowly lower your hips back down to the floor. Rest for up to 10 seconds. 10. Repeat 8 to 12 times. Single-leg balance 6. Stand on a flat surface with your arms stretched out to your sides like you are making the letter \"T. \" Then lift one leg off the floor, bending it at the knee. If you are not steady on your feet, use one hand to hold on to a chair, counter, or wall. 7. Keep your standing knee straight. Try to balance on that leg for up to 30 seconds. Then rest for up to 10 seconds. 8. Repeat 6 to 8 times with each leg. 9. When you can balance on one leg for 30 seconds with your eyes open, try to balance on it with your eyes closed. 10. When you can do this exercise with your eyes closed for 30 seconds and with ease and no pain, try it while standing on a pillow or piece of foam. 
Alternate arm and leg (bird dog) balance Do this exercise slowly. Try to keep your body straight at all times. 8. Start on the floor, on your hands and knees. 9. Tighten your belly muscles by pulling your belly button in toward your spine. Be sure you continue to breathe normally and do not hold your breath. 10. Raise one arm off the floor, and hold it straight out in front of you. Be careful not to let your shoulder drop down, because that will twist your trunk. 11. Hold for about 6 seconds, then lower your arm and switch to your other arm. 12. Repeat 8 to 12 times with each arm. 13. When you can do this exercise with ease and no pain, try it with one leg raised off the floor. Hold your leg straight out behind you. Be careful not to let your hip drop down, because that will twist your trunk. 14. When holding your leg straight out becomes easier, try raising your opposite arm at the same time. Repeat steps 1 through 5. Balance-building exercise 1 
 
4. Stand with a chair in front of you and a wall behind you, in case you lose your balance. 5. Stand with your feet together and your arms at your sides. 6. Move your head up and down 10 times. Balance-building exercise 2 
 
2. Turn your head side to side 10 times. Balance-building exercise 3 
 
2. Move your head diagonally up and down 10 times. Balance-building exercise 4 
 
2. Move your head diagonally up and down 10 times on the other side. Follow-up care is a key part of your treatment and safety. Be sure to make and go to all appointments, and call your doctor if you are having problems. It's also a good idea to know your test results and keep a list of the medicines you take. Where can you learn more? Go to http://marv-uvaldo.info/. Enter N417 in the search box to learn more about \"Movement Disorders: Exercises. \" Current as of: October 14, 2016 Content Version: 11.4 © 3747-7105 Healthwise, Incorporated. Care instructions adapted under license by [employer] (which disclaims liability or warranty for this information). If you have questions about a medical condition or this instruction, always ask your healthcare professional. [address] any warranty or liability for your use of this information. Introducing [facility]! Dear Elsa White: Thank you for requesting a Simio account. Our records indicate that you already have an active Simio account. You can access your account anytime at [email or web address] Did you know that you can access your hospital and ER discharge instructions at any time in Simio? You can also review all of your test results from your hospital stay or ER visit. Additional Information If you have questions, please visit the Frequently Asked Questions section of the Simio website at [email or web address] Remember, Simio is NOT to be used for urgent needs. For medical emergencies, dial 911. Now available from your iPhone and Android! Please provide this summary of care documentation to your next provider. Your primary care clinician is listed as Dixie Gallardo. If you have any questions after today's visit, please call [phone].

## 2018-05-29 NOTE — PROGRESS NOTES
[facility] Progress Note:   F/up Physician Visit    CC: Weight Management      Danie De Leon is a 41 y.o. female who is here for her  f/up physician visit for the VLCD / LCD Program.  She was in the losing phase a year ago and went into maintenance  March 2017. She has not been in for maintenance  She is 8 lbs up from the visit in march 2017          Weight Metrics 5/29/2018 5/29/2018 12/8/2017 12/1/2017 10/31/2017 5/16/2017 4/12/2017   Weight - 151 lb 8 oz 145 lb 145 lb 145 lb 148 lb 145 lb   Neck Circ (inches) 13 - - - - - -   Waist Measure Inches 32 - - - - - -   Exercise Mins/week - - - - - - -   Body Fat % - - - - - - -   BMI - 24.45 kg/m2 23.4 kg/m2 23.4 kg/m2 23.4 kg/m2 23.89 kg/m2 23.4 kg/m2         Outpatient Prescriptions Marked as Taking for the 5/29/18 encounter (Office Visit) with Nicolas Bueno MD   Medication Sig Dispense Refill    COPAXONE 40 mg/mL injection 40 mg by SubCUTAneous route Three (3) times a week. Brand Medically Necessary 12 Syringe 6    gabapentin (NEURONTIN) 300 mg capsule Take 2 Caps by mouth three (3) times daily. 200 Cap 5    esomeprazole (NEXIUM) 20 mg capsule Take  by mouth daily.  fluticasone (FLONASE) 50 mcg/actuation nasal spray 2 sprays each nostril one time daily for 30 days. 1 Bottle 3    albuterol (PROVENTIL HFA, VENTOLIN HFA, PROAIR HFA) 90 mcg/actuation inhaler Take 2 Puffs by inhalation every four (4) hours as needed. Participation   Did you attend clinic and class last week? no    Review of Systems  Since your last visit, have you experienced any complications? no  If yes, please list:       Are you taking an appetite suppressant? no  If so, is there any Chest Pain, Palpitations or Dizziness? BP Readings from Last 3 Encounters:   05/29/18 130/82   12/08/17 116/74   11/03/17 128/88       SLEEP:    Have you received any other medical care this week? no  If yes, where and for what?        Have you discontinued or changed any medicine or dose of your medicine since your last visit with Dr Karen Aldrich? no  If yes, where and for what? Diet  How many ounces of calorie-free liquids did you consume each day?  ? oz    How many meal replacements did you take each day? 0    Did you have any problems adhering to the program?  Has been in maintenance If yes, please explain:      Exercise  Aerobic exercise: walking dog 20 min  Resistance exercise: 0 workouts / week  Any discomfort?  no     If yes, where? Objective  Visit Vitals    /82    Pulse 62    Temp 98 °F (36.7 °C) (Oral)    Resp 18    Ht 5' 6\" (1.676 m)    Wt 151 lb 8 oz (68.7 kg)    LMP 05/15/2018    SpO2 98%    BMI 24.45 kg/m2     Patient's last menstrual period was 05/15/2018. Physical Exam  Appearance: well,   Mental:A&O x 3, NAD  H:NC/AT,  EENT:   EOMI, PERRL, No scleral icterus  Neck: no bruit or JVD  Lung: clear, No W/R  ABD: soft, active, nontender  Ext:  no Edema  Neuro: nonfocal  Assessment / Plan    Encounter Diagnoses   Name Primary?  Hyperinsulinemia Yes    Other hyperlipidemia     Overweight (BMI 25.0-29. 9)     Multiple sclerosis ([facility]      Diagnoses and all orders for this visit:    1. Hyperinsulinemia  Recheck   2. Other hyperlipidemia  Will get labs done later, given form  3. Overweight (BMI 25.0-29. 9)  She will be reinstating a mantenance status  4. Multiple sclerosis ([facility]  Had a recent     1. Weight management well controlled, borderline controlled   Progress was reviewed with patient    2. Labs    Latest results reviewed with patient   Lab slip given to pt for f/up HDL labs      15 minutes of the 30 minutes face to face time with Melissa Lopez consisted of counseling & coordinating and/or discussing treatment plans in reference to her obesity The primary encounter diagnosis was Hyperinsulinemia. Diagnoses of Other hyperlipidemia, Overweight (BMI 25.0-29.9), and Multiple sclerosis ([facility] were also pertinent to this visit.

## 2018-05-31 ENCOUNTER — OFFICE VISIT (OUTPATIENT)
Dept: FAMILY MEDICINE CLINIC | Age: 48
End: 2018-05-31

## 2018-05-31 VITALS
BODY MASS INDEX: 24.17 KG/M2 | RESPIRATION RATE: 16 BRPM | WEIGHT: 150.4 LBS | HEART RATE: 97 BPM | DIASTOLIC BLOOD PRESSURE: 83 MMHG | HEIGHT: 66 IN | OXYGEN SATURATION: 100 % | SYSTOLIC BLOOD PRESSURE: 120 MMHG | TEMPERATURE: 98.2 F

## 2018-05-31 DIAGNOSIS — B34.9 VIRAL SYNDROME: Primary | ICD-10-CM

## 2018-05-31 DIAGNOSIS — J02.9 SORE THROAT: ICD-10-CM

## 2018-05-31 LAB
S PYO AG THROAT QL: NEGATIVE
VALID INTERNAL CONTROL?: YES

## 2018-05-31 NOTE — PROGRESS NOTES
Chief Complaint   Patient presents with    Sore Throat     Fever and sore throat from last pm. Feeling draggy and weak

## 2018-05-31 NOTE — MR AVS SNAPSHOT
[facility] 
 
 
 [address] [facility], [facility] [address] [facility] 13 
[phone] Patient: Yazmin Hernandez MRN: [id number] :[id number] Visit Information Date & Time Provider Department Dept. Phone Encounter #  
 2018 12:00 PM Lisa Dumont, [address] [facility] [phone] [id number] Upcoming Health Maintenance Date Due DTaP/Tdap/Td series (1 - Tdap) 1993 PAP AKA CERVICAL CYTOLOGY 1993 Influenza Age 5 to Adult 2018 Allergies as of 2018  Review Complete On: 2018 By: Lisa Dumont NP No Known Allergies Current Immunizations  Reviewed on 11/3/2017 Name Date Influenza Vaccine 10/1/2017 Not reviewed this visit You Were Diagnosed With   
  
 Codes Comments Viral syndrome    -  Primary ICD-10-CM: B34.9 ICD-9-CM: 079.99 Sore throat     ICD-10-CM: J02.9 ICD-9-CM: 240 Vitals BP Pulse Temp Resp Height(growth percentile) Weight(growth percentile) 120/83 97 98.2 °F (36.8 °C) (Oral) 16 5' 6\" (1.676 m) 150 lb 6.4 oz (68.2 kg) LMP SpO2 BMI OB Status Smoking Status 05/15/2018 100% 24.28 kg/m2 Having regular periods Never Smoker Vitals History BMI and BSA Data Body Mass Index Body Surface Area  
 24.28 kg/m 2 1.78 m 2 Preferred Pharmacy Pharmacy Name Phone [id number] [address] [facility], [address] [phone] Your Updated Medication List  
  
   
This list is accurate as of 18 12:25 PM.  Always use your most recent med list.  
  
  
  
  
 albuterol 90 mcg/actuation inhaler Commonly known as:  PROVENTIL HFA, VENTOLIN HFA, PROAIR HFA Take 2 Puffs by inhalation every four (4) hours as needed. B COMPLEX 1 tablet Generic drug:  b complex vitamins Take 1 Tab by mouth daily. COPAXONE 40 mg/mL injection Generic drug:  glatiramer 40 mg by SubCUTAneous route Three (3) times a week.  Brand Medically Necessary  
  
 fluticasone 50 mcg/actuation nasal spray Commonly known as:  FLONASE  
2 sprays each nostril one time daily for 30 days. gabapentin 300 mg capsule Commonly known as:  NEURONTIN Take 2 Caps by mouth three (3) times daily. NexIUM 20 mg capsule Generic drug:  esomeprazole Take  by mouth daily. VITAMIN D3 2,000 unit Tab Generic drug:  cholecalciferol (vitamin D3) Take  by mouth. We Performed the Following AMB POC RAPID STREP A [14613 CPT(R)] Introducing [address]! Dear Carlos Rosa: Thank you for requesting a [employer] account. Our records indicate that you already have an active [employer] account. You can access your account anytime at [email or web address] Did you know that you can access your hospital and ER discharge instructions at any time in [employer]um? You can also review all of your test results from your hospital stay or ER visit. Additional Information If you have questions, please visit the Frequently Asked Questions section of the [employer] website at [email or web address] Remember, Gum[employer]um is NOT to be used for urgent needs. For medical emergencies, dial 911. Now available from your iPhone and Android! Please provide this summary of care documentation to your next provider. Your primary care clinician is listed as Wong Merino. If you have any questions after today's visit, please call [phone].

## 2018-05-31 NOTE — PROGRESS NOTES
Subjective:      Patient : This patient is a 40 yo female that presents with a chief complaint of cough:  non productive, sore throat : which increases with swallowing  and ear ache:  bilaterals. The symptoms have been present for 1 days. They have worsened. Objective:     Visit Vitals    /83    Pulse 97    Temp 98.2 °F (36.8 °C) (Oral)    Resp 16    Ht 5' 6\" (1.676 m)    Wt 150 lb 6.4 oz (68.2 kg)    LMP 05/15/2018    SpO2 100%    BMI 24.28 kg/m2       HEENT:  pharyngeal erythema  Heart regular rhythm  Lungs: clear to auscultation and percussion throughout both lung fields  CV:normal  Abdomen  normal  Extremeties:no clubbing, cyanosis, edema  Neuro alert, oriented x 3    Rapid strep Negative    Assessment:     URI    Plan:     The patient seems to have a viral process. Will institute symptomatic therapy. Suggested Mucinex D 600 q12 OTC and Zyrtec 10mg daily for 5-7 days. Follow up in office 7 days if persist.    I have discussed the diagnosis with the patient and the intended plan as seen in the above orders. The patient has received an after-visit summary and questions were answered concerning future plans. Patient conveyed understanding of the plan at the time of the visit.     Trish Rodriguez, MSN, ANP  5/31/2018

## 2018-06-25 DIAGNOSIS — G35 MULTIPLE SCLEROSIS (HCC): ICD-10-CM

## 2018-06-25 DIAGNOSIS — G37.3 ACUTE TRANSVERSE MYELITIS IN DEMYELINATING DISEASE OF CENTRAL NERVOUS SYSTEM (HCC): ICD-10-CM

## 2018-07-01 RX ORDER — GABAPENTIN 300 MG/1
600 CAPSULE ORAL 3 TIMES DAILY
Qty: 540 CAP | Refills: 3 | Status: SHIPPED | OUTPATIENT
Start: 2018-07-01 | End: 2018-10-23 | Stop reason: SDUPTHER

## 2018-08-16 RX ORDER — PROPRANOLOL HYDROCHLORIDE 10 MG/1
TABLET ORAL
Qty: 120 TAB | Refills: 6 | Status: SHIPPED | OUTPATIENT
Start: 2018-08-16 | End: 2018-10-24 | Stop reason: SDUPTHER

## 2018-09-13 ENCOUNTER — HOSPITAL ENCOUNTER (EMERGENCY)
Age: 48
Discharge: HOME | End: 2018-09-14
Attending: EMERGENCY MEDICINE
Payer: COMMERCIAL

## 2018-09-13 DIAGNOSIS — R11.2 NON-INTRACTABLE VOMITING WITH NAUSEA, UNSPECIFIED VOMITING TYPE: ICD-10-CM

## 2018-09-13 DIAGNOSIS — R10.30 ABDOMINAL PAIN, LOWER: Primary | ICD-10-CM

## 2018-09-13 DIAGNOSIS — N93.8 DUB (DYSFUNCTIONAL UTERINE BLEEDING): ICD-10-CM

## 2018-09-13 LAB
ALBUMIN SERPL-MCNC: 3.9 G/DL (ref 3.5–5)
ALBUMIN/GLOB SERPL: 1.1 {RATIO} (ref 1.1–2.2)
ALP SERPL-CCNC: 53 U/L (ref 45–117)
ALT SERPL-CCNC: 23 U/L (ref 12–78)
ANION GAP SERPL CALC-SCNC: 10 MMOL/L (ref 5–15)
AST SERPL-CCNC: 16 U/L (ref 15–37)
BASOPHILS # BLD: 0 K/UL (ref 0–0.1)
BASOPHILS NFR BLD: 0 % (ref 0–1)
BILIRUB SERPL-MCNC: 0.6 MG/DL (ref 0.2–1)
BUN SERPL-MCNC: 15 MG/DL (ref 6–20)
BUN/CREAT SERPL: 24 (ref 12–20)
CALCIUM SERPL-MCNC: 8.9 MG/DL (ref 8.5–10.1)
CHLORIDE SERPL-SCNC: 103 MMOL/L (ref 97–108)
CO2 SERPL-SCNC: 27 MMOL/L (ref 21–32)
COMMENT, HOLDF: NORMAL
CREAT SERPL-MCNC: 0.62 MG/DL (ref 0.55–1.02)
DIFFERENTIAL METHOD BLD: ABNORMAL
EOSINOPHIL # BLD: 0 K/UL (ref 0–0.4)
EOSINOPHIL NFR BLD: 0 % (ref 0–7)
ERYTHROCYTE [DISTWIDTH] IN BLOOD BY AUTOMATED COUNT: 13.2 % (ref 11.5–14.5)
GLOBULIN SER CALC-MCNC: 3.7 G/DL (ref 2–4)
GLUCOSE SERPL-MCNC: 91 MG/DL (ref 65–100)
HCT VFR BLD AUTO: 39.4 % (ref 35–47)
HGB BLD-MCNC: 13.5 G/DL (ref 11.5–16)
IMM GRANULOCYTES # BLD: 0 K/UL (ref 0–0.04)
IMM GRANULOCYTES NFR BLD AUTO: 0 % (ref 0–0.5)
LACTATE BLD-SCNC: 1.5 MMOL/L (ref 0.4–2)
LIPASE SERPL-CCNC: 174 U/L (ref 73–393)
LYMPHOCYTES # BLD: 1.5 K/UL (ref 0.8–3.5)
LYMPHOCYTES NFR BLD: 17 % (ref 12–49)
MCH RBC QN AUTO: 29.5 PG (ref 26–34)
MCHC RBC AUTO-ENTMCNC: 34.3 G/DL (ref 30–36.5)
MCV RBC AUTO: 86 FL (ref 80–99)
MONOCYTES # BLD: 0 K/UL (ref 0–1)
MONOCYTES NFR BLD: 0 % (ref 5–13)
NEUTS BAND NFR BLD MANUAL: 13 %
NEUTS SEG # BLD: 7.5 K/UL (ref 1.8–8)
NEUTS SEG NFR BLD: 70 % (ref 32–75)
PLATELET # BLD AUTO: 259 K/UL (ref 150–400)
PMV BLD AUTO: 11.5 FL (ref 8.9–12.9)
POTASSIUM SERPL-SCNC: 3.6 MMOL/L (ref 3.5–5.1)
PROT SERPL-MCNC: 7.6 G/DL (ref 6.4–8.2)
RBC # BLD AUTO: 4.58 M/UL (ref 3.8–5.2)
RBC MORPH BLD: ABNORMAL
SAMPLES BEING HELD,HOLD: NORMAL
SODIUM SERPL-SCNC: 140 MMOL/L (ref 136–145)
WBC # BLD AUTO: 9 K/UL (ref 3.6–11)
XXWBCSUS: 1

## 2018-09-13 PROCEDURE — 96374 THER/PROPH/DIAG INJ IV PUSH: CPT

## 2018-09-13 PROCEDURE — 80053 COMPREHEN METABOLIC PANEL: CPT | Performed by: EMERGENCY MEDICINE

## 2018-09-13 PROCEDURE — 36415 COLL VENOUS BLD VENIPUNCTURE: CPT | Performed by: EMERGENCY MEDICINE

## 2018-09-13 PROCEDURE — 74011250636 HC RX REV CODE- 250/636: Performed by: EMERGENCY MEDICINE

## 2018-09-13 PROCEDURE — 85025 COMPLETE CBC W/AUTO DIFF WBC: CPT | Performed by: EMERGENCY MEDICINE

## 2018-09-13 PROCEDURE — 96361 HYDRATE IV INFUSION ADD-ON: CPT

## 2018-09-13 PROCEDURE — 99284 EMERGENCY DEPT VISIT MOD MDM: CPT

## 2018-09-13 PROCEDURE — 81001 URINALYSIS AUTO W/SCOPE: CPT | Performed by: EMERGENCY MEDICINE

## 2018-09-13 PROCEDURE — 83605 ASSAY OF LACTIC ACID: CPT

## 2018-09-13 PROCEDURE — 83690 ASSAY OF LIPASE: CPT | Performed by: EMERGENCY MEDICINE

## 2018-09-13 RX ORDER — ONDANSETRON 2 MG/ML
8 INJECTION INTRAMUSCULAR; INTRAVENOUS
Status: COMPLETED | OUTPATIENT
Start: 2018-09-13 | End: 2018-09-13

## 2018-09-13 RX ADMIN — SODIUM CHLORIDE 1000 ML: 900 INJECTION, SOLUTION INTRAVENOUS at 23:25

## 2018-09-13 RX ADMIN — ONDANSETRON 8 MG: 2 INJECTION INTRAMUSCULAR; INTRAVENOUS at 23:25

## 2018-09-14 VITALS
OXYGEN SATURATION: 99 % | SYSTOLIC BLOOD PRESSURE: 113 MMHG | RESPIRATION RATE: 16 BRPM | TEMPERATURE: 97.6 F | DIASTOLIC BLOOD PRESSURE: 71 MMHG | BODY MASS INDEX: 24.77 KG/M2 | HEART RATE: 71 BPM | WEIGHT: 154.1 LBS | HEIGHT: 66 IN

## 2018-09-14 LAB
APPEARANCE UR: CLEAR
BACTERIA URNS QL MICRO: NEGATIVE /HPF
BILIRUB UR QL: NEGATIVE
COLOR UR: ABNORMAL
EPITH CASTS URNS QL MICRO: ABNORMAL /LPF
GLUCOSE UR STRIP.AUTO-MCNC: NEGATIVE MG/DL
HCG UR QL: NEGATIVE
HGB UR QL STRIP: ABNORMAL
KETONES UR QL STRIP.AUTO: NEGATIVE MG/DL
LEUKOCYTE ESTERASE UR QL STRIP.AUTO: NEGATIVE
NITRITE UR QL STRIP.AUTO: NEGATIVE
PH UR STRIP: 6 [PH] (ref 5–8)
PROT UR STRIP-MCNC: NEGATIVE MG/DL
RBC #/AREA URNS HPF: ABNORMAL /HPF (ref 0–5)
SP GR UR REFRACTOMETRY: 1.01 (ref 1–1.03)
UR CULT HOLD, URHOLD: NORMAL
UROBILINOGEN UR QL STRIP.AUTO: 0.2 EU/DL (ref 0.2–1)
WBC URNS QL MICRO: ABNORMAL /HPF (ref 0–4)

## 2018-09-14 PROCEDURE — 81025 URINE PREGNANCY TEST: CPT

## 2018-09-14 RX ORDER — DICYCLOMINE HYDROCHLORIDE 20 MG/1
20 TABLET ORAL
Qty: 20 TAB | Refills: 0 | Status: SHIPPED | OUTPATIENT
Start: 2018-09-14 | End: 2018-12-19

## 2018-09-14 RX ORDER — ONDANSETRON 8 MG/1
8 TABLET, ORALLY DISINTEGRATING ORAL
Qty: 12 TAB | Refills: 0 | Status: SHIPPED | OUTPATIENT
Start: 2018-09-14 | End: 2018-09-21

## 2018-09-14 NOTE — ED TRIAGE NOTES
Pt rpts abd cramping with nausea and vomiting that started approx 1 1/2 hours ago. Denies urinary symptoms.

## 2018-09-14 NOTE — ED PROVIDER NOTES
HPI Comments: The patient presents to the ED with abdominal pain. Symptoms started a few hours ago after taking her Copaxone. She is on Copaxone for MS and in Nov 2017 she was changed to three time a week dosing. She feels she gets abdominal cramping and nausea with her dosing Capoxone. Soon after her injection this evining she developed nausea, abdominal cramping, and vomiting. Emesis is non-bloody and non-bilious. She also notes lower abdominal pain. She is having vaginal bleeding which began a few hours ago. LMP was 8/27 and she denies irregular menses. She has no fever. She denies any diarrhea. She is not constipated. She has no hematuria or dysuria. No meds for her symptoms have been taken. She has hx appendectomy, cholecystectomy, and C/S x1. She has never had a colonoscopy. Of note, she has a PCP appointment tomorrow. Patient is a 57 y.o. female presenting with abdominal pain and vomiting. The history is provided by the patient and the spouse. Abdominal Pain    Associated symptoms include nausea and vomiting. Pertinent negatives include no fever, no diarrhea, no dysuria, no headaches and no chest pain. Vomiting    Associated symptoms include abdominal pain. Pertinent negatives include no chills, no fever, no diarrhea, no headaches, no cough and no headaches.         Past Medical History:   Diagnosis Date    Asthma     MS (multiple sclerosis) ([facility]        Past Surgical History:   Procedure Laterality Date    HX APPENDECTOMY      HX CHOLECYSTECTOMY      HX GYN      c/sec         Family History:   Problem Relation Age of Onset    Hypertension Mother     Other Mother      Hemoglobin C disorder    Asthma Father     Other Maternal Aunt      Hemoglobin C disorder    Cancer Paternal Uncle      pancreatic    Heart Disease Paternal Uncle     Cancer Paternal Grandmother     Heart Disease Paternal Grandfather     Asthma Child        Social History     Social History    Marital status:  Spouse name: N/A    Number of children: N/A    Years of education: N/A     Occupational History    Not on file. Social History Main Topics    Smoking status: Never Smoker    Smokeless tobacco: Never Used    Alcohol use 1.2 oz/week     2 Glasses of wine per week      Comment: 4-5 drinks per week    Drug use: No    Sexual activity: Not on file     Other Topics Concern    Not on file     Social History Narrative         ALLERGIES: Review of patient's allergies indicates no known allergies. Review of Systems   Constitutional: Negative for appetite change, chills and fever. HENT: Negative for congestion, nosebleeds and sore throat. Eyes: Negative for pain and discharge. Respiratory: Negative for cough and shortness of breath. Cardiovascular: Negative for chest pain. Gastrointestinal: Positive for abdominal pain, nausea and vomiting. Negative for diarrhea. Genitourinary: Positive for pelvic pain and vaginal bleeding. Negative for dysuria. Musculoskeletal: Negative. Skin: Negative for rash. Neurological: Negative for weakness and headaches. Hematological: Negative for adenopathy. Psychiatric/Behavioral: Negative. All other systems reviewed and are negative. Vitals:    09/13/18 2255 09/14/18 0004   BP: 120/77 113/71   Pulse: 81 71   Resp: 18 16   Temp: 97.6 °F (36.4 °C)    SpO2: 98% 99%   Weight: 69.9 kg (154 lb 1.6 oz)    Height: 5' 6\" (1.676 m)             Physical Exam   Constitutional: She is oriented to person, place, and time. She appears well-developed and well-nourished. HENT:   Head: Normocephalic and atraumatic. Mouth/Throat: Oropharynx is clear and moist.   Eyes: Conjunctivae are normal.   Neck: Normal range of motion. Neck supple. Cardiovascular: Normal rate, regular rhythm and normal heart sounds. Pulmonary/Chest: Effort normal and breath sounds normal.   Abdominal: Soft. Bowel sounds are normal. There is tenderness (suprapbic).    Musculoskeletal: Normal range of motion. She exhibits no edema or tenderness. Neurological: She is alert and oriented to person, place, and time. Skin: Skin is warm and dry. Psychiatric: She has a normal mood and affect. Her behavior is normal.   Nursing note and vitals reviewed. [facility]      ED Course       Procedures    A/P:1  1. N/V - resolved. ? Side effected from Copaxone. She will discuss with Dr. Tami Carlisle. 2. Abdominal pain - Bentyl prn  3. DUB - f/u with ob/gyn    2:46 AM  Patient's results have been reviewed with them. Patient and/or family have verbally conveyed their understanding and agreement of the patient's signs, symptoms, diagnosis, treatment and prognosis and additionally agree to follow up as recommended or return to the Emergency Room should their condition change prior to follow-up. Discharge instructions have also been provided to the patient with some educational information regarding their diagnosis as well a list of reasons why they would want to return to the ER prior to their follow-up appointment should their condition change.

## 2018-09-14 NOTE — ED NOTES
The patient was discharged home by Dr. Marycruz Zee and Jacinta Duran rn in stable condition, accompanied by family. The patient is alert and oriented, is in no respiratory distress and has vital signs within normal limits . The patient's diagnosis, condition and treatment were explained to patient. The patient expressed understanding. Prescriptions given to pt. No work/school note given to pt. A discharge plan has been developed. A  was not involved in the process. Aftercare instructions were given to the patient. Pt's saline lock removed without complications. Family will transport pt home.

## 2018-09-14 NOTE — DISCHARGE INSTRUCTIONS
Abdominal Pain: Care Instructions  Your Care Instructions    Abdominal pain has many possible causes. Some aren't serious and get better on their own in a few days. Others need more testing and treatment. If your pain continues or gets worse, you need to be rechecked and may need more tests to find out what is wrong. You may need surgery to correct the problem. Don't ignore new symptoms, such as fever, nausea and vomiting, urination problems, pain that gets worse, and dizziness. These may be signs of a more serious problem. Your doctor may have recommended a follow-up visit in the next 8 to 12 hours. If you are not getting better, you may need more tests or treatment. The doctor has checked you carefully, but problems can develop later. If you notice any problems or new symptoms, get medical treatment right away. Follow-up care is a key part of your treatment and safety. Be sure to make and go to all appointments, and call your doctor if you are having problems. It's also a good idea to know your test results and keep a list of the medicines you take. How can you care for yourself at home? · Rest until you feel better. · To prevent dehydration, drink plenty of fluids, enough so that your urine is light yellow or clear like water. Choose water and other caffeine-free clear liquids until you feel better. If you have kidney, heart, or liver disease and have to limit fluids, talk with your doctor before you increase the amount of fluids you drink. · If your stomach is upset, eat mild foods, such as rice, dry toast or crackers, bananas, and applesauce. Try eating several small meals instead of two or three large ones. · Wait until 48 hours after all symptoms have gone away before you have spicy foods, alcohol, and drinks that contain caffeine. · Do not eat foods that are high in fat. · Avoid anti-inflammatory medicines such as aspirin, ibuprofen (Advil, Motrin), and naproxen (Aleve).  These can cause stomach upset. Talk to your doctor if you take daily aspirin for another health problem. When should you call for help? Call 911 anytime you think you may need emergency care. For example, call if:    · You passed out (lost consciousness).     · You pass maroon or very bloody stools.     · You vomit blood or what looks like coffee grounds.     · You have new, severe belly pain.    Call your doctor now or seek immediate medical care if:    · Your pain gets worse, especially if it becomes focused in one area of your belly.     · You have a new or higher fever.     · Your stools are black and look like tar, or they have streaks of blood.     · You have unexpected vaginal bleeding.     · You have symptoms of a urinary tract infection. These may include:  ¨ Pain when you urinate. ¨ Urinating more often than usual.  ¨ Blood in your urine.     · You are dizzy or lightheaded, or you feel like you may faint.    Watch closely for changes in your health, and be sure to contact your doctor if:    · You are not getting better after 1 day (24 hours). Where can you learn more? Go to http://marviJouleuvaldo.info/. Enter G623 in the search box to learn more about \"Abdominal Pain: Care Instructions. \"  Current as of: November 20, 2017  Content Version: 11.7  © 7035-2374 [employer]. Care instructions adapted under license by [employer] (which disclaims liability or warranty for this information). If you have questions about a medical condition or this instruction, always ask your healthcare professional. [place] [address] any warranty or liability for your use of this information. Abnormal Uterine Bleeding: Care Instructions  Your Care Instructions    Abnormal uterine bleeding (AUB) is irregular bleeding from the uterus that is longer or heavier than usual or does not occur at your regular time. Sometimes it is caused by changes in hormone levels.  It can also be caused by growths in the uterus, such as fibroids or polyps. Sometimes a cause cannot be found. You may have heavy bleeding when you are not expecting your period. Your doctor may suggest a pregnancy test, if you think you are pregnant. Follow-up care is a key part of your treatment and safety. Be sure to make and go to all appointments, and call your doctor if you are having problems. It's also a good idea to know your test results and keep a list of the medicines you take. How can you care for yourself at home? · Be safe with medicines. Take pain medicines exactly as directed. ¨ If the doctor gave you a prescription medicine for pain, take it as prescribed. ¨ If you are not taking a prescription pain medicine, ask your doctor if you can take an over-the-counter medicine. · You may be low in iron because of blood loss. Eat a balanced diet that is high in iron and vitamin C. Foods rich in iron include red meat, shellfish, eggs, beans, and leafy green vegetables. Talk to your doctor about whether you need to take iron pills or a multivitamin. When should you call for help? Call 911 anytime you think you may need emergency care. For example, call if:    · You passed out (lost consciousness).    Call your doctor now or seek immediate medical care if:    · You have new or worse belly or pelvic pain.     · You have severe vaginal bleeding.     · You feel dizzy or lightheaded, or you feel like you may faint.    Watch closely for changes in your health, and be sure to contact your doctor if:    · You think you may be pregnant.     · Your bleeding gets worse.     · You do not get better as expected. Where can you learn more? Go to http://marv-uvaldo.info/. Enter I887 in the search box to learn more about \"Abnormal Uterine Bleeding: Care Instructions. \"  Current as of: October 6, 2017  Content Version: 11.7  © 0975-1417 [employer].  Care instructions adapted under license by [employer] (which disclaims liability or warranty for this information). If you have questions about a medical condition or this instruction, always ask your healthcare professional. [address] any warranty or liability for your use of this information. Nausea and Vomiting: Care Instructions  Your Care Instructions    When you are nauseated, you may feel weak and sweaty and notice a lot of saliva in your mouth. Nausea often leads to vomiting. Most of the time you do not need to worry about nausea and vomiting, but they can be signs of other illnesses. Two common causes of nausea and vomiting are stomach flu and food poisoning. Nausea and vomiting from viral stomach flu will usually start to improve within 24 hours. Nausea and vomiting from food poisoning may last from 12 to 48 hours. The doctor has checked you carefully, but problems can develop later. If you notice any problems or new symptoms, get medical treatment right away. Follow-up care is a key part of your treatment and safety. Be sure to make and go to all appointments, and call your doctor if you are having problems. It's also a good idea to know your test results and keep a list of the medicines you take. How can you care for yourself at home? · To prevent dehydration, drink plenty of fluids, enough so that your urine is light yellow or clear like water. Choose water and other caffeine-free clear liquids until you feel better. If you have kidney, heart, or liver disease and have to limit fluids, talk with your doctor before you increase the amount of fluids you drink. · Rest in bed until you feel better. · When you are able to eat, try clear soups, mild foods, and liquids until all symptoms are gone for 12 to 48 hours. Other good choices include dry toast, crackers, cooked cereal, and gelatin dessert, such as Jell-O. When should you call for help? Call 911 anytime you think you may need emergency care.  For example, call if:    · You passed out (lost consciousness).    Call your doctor now or seek immediate medical care if:    · You have symptoms of dehydration, such as:  ¨ Dry eyes and a dry mouth. ¨ Passing only a little dark urine. ¨ Feeling thirstier than usual.     · You have new or worsening belly pain.     · You have a new or higher fever.     · You vomit blood or what looks like coffee grounds.    Watch closely for changes in your health, and be sure to contact your doctor if:    · You have ongoing nausea and vomiting.     · Your vomiting is getting worse.     · Your vomiting lasts longer than 2 days.     · You are not getting better as expected. Where can you learn more? Go to http://marv-uvaldo.info/. Enter 25 [id number] in the search box to learn more about \"Nausea and Vomiting: Care Instructions. \"  Current as of: November 20, 2017  Content Version: 11.7  © 8786-8519 [employer]. Care instructions adapted under license by [employer] (which disclaims liability or warranty for this information). If you have questions about a medical condition or this instruction, always ask your healthcare professional. [place] [address] any warranty or liability for your use of this information.

## 2018-09-20 ENCOUNTER — TELEPHONE (OUTPATIENT)
Dept: NEUROLOGY | Age: 48
End: 2018-09-20

## 2018-09-20 DIAGNOSIS — G37.3 ACUTE TRANSVERSE MYELITIS IN DEMYELINATING DISEASE OF CENTRAL NERVOUS SYSTEM (HCC): ICD-10-CM

## 2018-09-20 DIAGNOSIS — G35 MULTIPLE SCLEROSIS (HCC): Primary | ICD-10-CM

## 2018-09-20 RX ORDER — GLATIRAMER ACETATE 20 MG/ML
20 INJECTION, SOLUTION SUBCUTANEOUS DAILY
Qty: 90 SYRINGE | Refills: 5 | Status: SHIPPED | OUTPATIENT
Start: 2018-09-20 | End: 2018-09-26 | Stop reason: SDUPTHER

## 2018-09-20 NOTE — TELEPHONE ENCOUNTER
I called the patient, let her know on sending the prescription N, mucous prescription in for Copaxone 20 mg daily subcu sent in to pharmacy for her, recommended she consider switching to oral agents, she will call me back if she wants to do that.

## 2018-09-20 NOTE — TELEPHONE ENCOUNTER
I spoke with the patient and she feels the Copaxone 40mg 3 times weekly is too strong for her and would like to go back to Copaxone 20mg daily. She does not feel very well when she does the shot and it takes a few days to recover. Please advise of going back to daily Copaxone.   Send to [address] in the chart

## 2018-09-26 ENCOUNTER — TELEPHONE (OUTPATIENT)
Dept: NEUROLOGY | Age: 48
End: 2018-09-26

## 2018-09-26 DIAGNOSIS — G37.3 ACUTE TRANSVERSE MYELITIS IN DEMYELINATING DISEASE OF CENTRAL NERVOUS SYSTEM (HCC): ICD-10-CM

## 2018-09-26 DIAGNOSIS — G35 MULTIPLE SCLEROSIS (HCC): ICD-10-CM

## 2018-09-26 NOTE — TELEPHONE ENCOUNTER
Copaxone 20 mg approved - Case [id number]  9/26/18 - 9/26/19. Called in clinicals to Patricia. Her plan only allows for 30 for 30 days. It is written for 90/90. Please review quantity and I will be faxing it to [facility] now to expedite shipment. Called pt, left detailed vm notifying of approval and gave [facility] # on message.

## 2018-09-27 RX ORDER — GLATIRAMER ACETATE 20 MG/ML
20 INJECTION, SOLUTION SUBCUTANEOUS DAILY
Qty: 30 SYRINGE | Refills: 11 | Status: SHIPPED | OUTPATIENT
Start: 2018-09-27 | End: 2019-11-26 | Stop reason: SDUPTHER

## 2018-10-11 ENCOUNTER — TELEPHONE (OUTPATIENT)
Dept: NEUROLOGY | Age: 48
End: 2018-10-11

## 2018-10-11 NOTE — TELEPHONE ENCOUNTER
Patient would like to switch to [facility] because she does not like the idea of daily shots any longer and failed on 3 times per week of copaxone.   Please call to discuss with the patient

## 2018-10-23 ENCOUNTER — TELEPHONE (OUTPATIENT)
Dept: NEUROLOGY | Age: 48
End: 2018-10-23

## 2018-10-23 DIAGNOSIS — G37.3 ACUTE TRANSVERSE MYELITIS IN DEMYELINATING DISEASE OF CENTRAL NERVOUS SYSTEM (HCC): ICD-10-CM

## 2018-10-23 DIAGNOSIS — G35 MULTIPLE SCLEROSIS (HCC): ICD-10-CM

## 2018-10-23 RX ORDER — GABAPENTIN 300 MG/1
600 CAPSULE ORAL 3 TIMES DAILY
Qty: 540 CAP | Refills: 3 | OUTPATIENT
Start: 2018-10-23 | End: 2019-05-10 | Stop reason: SDUPTHER

## 2018-10-23 NOTE — TELEPHONE ENCOUNTER
Refill request came through for 90 day gabapentin. I called the pharmacy and they did not get the one from 7/1/18 that was sent in.   Called in for patient

## 2018-10-24 ENCOUNTER — OFFICE VISIT (OUTPATIENT)
Dept: BEHAVIORAL/MENTAL HEALTH CLINIC | Age: 48
End: 2018-10-24

## 2018-10-24 VITALS
BODY MASS INDEX: 24.59 KG/M2 | SYSTOLIC BLOOD PRESSURE: 126 MMHG | DIASTOLIC BLOOD PRESSURE: 80 MMHG | WEIGHT: 153 LBS | HEART RATE: 76 BPM | HEIGHT: 66 IN

## 2018-10-24 DIAGNOSIS — F41.1 GAD (GENERALIZED ANXIETY DISORDER): Primary | ICD-10-CM

## 2018-10-24 DIAGNOSIS — F40.10 SOCIAL PHOBIA INVOLVING FEAR OF PUBLIC SPEAKING: ICD-10-CM

## 2018-10-24 RX ORDER — PROPRANOLOL HYDROCHLORIDE 60 MG/1
TABLET ORAL
Qty: 30 TAB | Refills: 3 | Status: SHIPPED | OUTPATIENT
Start: 2018-10-24 | End: 2018-11-21 | Stop reason: SINTOL

## 2018-10-24 RX ORDER — TRAZODONE HYDROCHLORIDE 100 MG/1
100 TABLET ORAL
Qty: 30 TAB | Refills: 1 | Status: SHIPPED | OUTPATIENT
Start: 2018-10-24 | End: 2018-11-21 | Stop reason: SINTOL

## 2018-10-24 RX ORDER — BUSPIRONE HYDROCHLORIDE 10 MG/1
10 TABLET ORAL DAILY
COMMUNITY
Start: 2018-09-19 | End: 2018-10-24 | Stop reason: SINTOL

## 2018-10-24 RX ORDER — PAROXETINE 10 MG/1
10 TABLET, FILM COATED ORAL DAILY
Qty: 30 TAB | Refills: 1 | Status: SHIPPED | OUTPATIENT
Start: 2018-10-24 | End: 2018-10-26

## 2018-10-26 RX ORDER — PAROXETINE 10 MG/1
10 TABLET, FILM COATED ORAL DAILY
Qty: 30 TAB | Refills: 1 | Status: SHIPPED | OUTPATIENT
Start: 2018-10-26 | End: 2018-11-21 | Stop reason: SDUPTHER

## 2018-10-26 NOTE — PROGRESS NOTES
Ambulatory Initial Psychiatric Evaluation     Chief Complaint:   Chief Complaint   Patient presents with    New Patient     self referred for anxiety        History of Present Illness: Louis Chavez is a 57 y.o. , AA female who presents with history of anxiety, fear of public speaking and multiple medical problems was seen today to establish her mental health treatment here. Patient is an  at [email or web address] over past 2 years. She reported that her fear of public speaking in a larger group has been worsening over the past 18 months. She is not sure why her confidence has declined except that her supervisor, , has been treating her pretty badly. 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 Complains against this individual has gone to the highest level and he has been counseled on his behaviors. Lately his behavior has changed a little. Patient reported that she has been trying to avoid him as much as she can but has to work with him and continue to keep her job. Patient has been tried by me on low-dose of propranolol, which has not been very effective on a small dose and she has been taking almost 50 mg of propranolol prior to her presentations. Patient was recently started on BuSpar by her PCP but she is only taking a very small dose, once a day, which is not effective. She takes valerian roots at night for sleep and anxiety, which does not seem to work at this time. Patient denies any significant depression, yecenia or psychosis. Patient denies any illicit drug or alcohol abuse. She denies any OCD symptoms. Patient denies any nightmares or flashbacks. Patient denies any suicidal or homicidal ideations. She has never been hospitalized for mental health reasons.     Patient has multiple medical problems including multiple sclerosis and asthma. She reported that she is compliant with her medications. Scales:    PHQ-9 : 4 - WNL  HAM-A: 7 - WNL    Past Psychiatric History:   As per HPI patient has a history of anxiety and fear of public speaking. She has not benefited with propranolol or BuSpar. She was given prescription of Lexapro which she never filled. Patient has never been hospitalized for mental health reasons. Past history of substance use:   Patient is a social drinker. She does not have any history of illicit drug or alcohol abuse. She is not a smoker. Social History: Patient has been  for 6 years. She has 1 son who is 10years old. Patient has master's in healthcare administration. 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 Patient denies any legal problems. Patient denies any childhood abuse. Patient mother has anxiety/depression. Patient reported that her relationship with her  is very good. Her son is in a good health. Family History:   Family History   Problem Relation Age of Onset    Hypertension Mother     Other Mother         Hemoglobin C disorder    Asthma Father     Other Maternal Aunt         Hemoglobin C disorder    Cancer Paternal Uncle         pancreatic    Heart Disease Paternal Uncle     Cancer Paternal Grandmother     Heart Disease Paternal Grandfather     Asthma Child         Past Medical History:   Past Medical History:   Diagnosis Date    Asthma     MS (multiple sclerosis) ([facility] [address]          Allergies:   No Known Allergies     Medication List:   Current Outpatient Medications   Medication Sig Dispense Refill    propranolol (INDERAL) 60 mg tablet One day PRN 30 Tab 3    PARoxetine (PAXIL) 10 mg tablet Take 1 Tab by mouth daily. 30 Tab 1    traZODone (DESYREL) 100 mg tablet Take 1 Tab by mouth nightly. 30 Tab 1    gabapentin (NEURONTIN) 300 mg capsule Take 2 Caps by mouth three (3) times daily.  540 Cap 3    glatiramer (COPAXONE) 20 mg/mL injection 20 mg by SubCUTAneous route daily. BRAND MEDICALLY NECESSARY 30 Syringe 11    dicyclomine (BENTYL) 20 mg tablet Take 1 Tab by mouth every six (6) hours as needed (abdominal cramps) for up to 20 doses. 20 Tab 0    OTHER Valerian Root qjs      albuterol (PROVENTIL HFA, VENTOLIN HFA, PROAIR HFA) 90 mcg/actuation inhaler Take 2 Puffs by inhalation every four (4) hours as needed. ROS:  Constitutional: positive for fatigue  Eyes: positive for contacts/glasses  Ears, nose, mouth, throat, and face: negative for hearing loss and tinnitus  Respiratory: negative for cough or wheezing  Cardiovascular: negative for chest pain, palpitations  Gastrointestinal: negative for reflux symptoms and constipation  Genitourinary:negative for frequency and urinary incontinence  Musculoskeletal:negative for myalgias and arthralgias  Neurological: negative for headaches, seizures and memory problems  Behavioral/Psych: positive for anxiety and sleep disturbance, negative for SI or HI    Psychiatric/Mental Status Examination:     MENTAL STATUS EXAM:  Sensorium  oriented to time, place and person   Orientation person, place, time/date, situation, day of week, month of year and year   Relations cooperative and passive   Eye Contact appropriate   Appearance:  age appropriate and casually dressed   Motor Behavior:  within normal limits   Speech:  normal pitch and normal volume   Vocabulary average   Thought Process: logical   Thought Content free of delusions and free of hallucinations   Suicidal ideations none   Homicidal ideations none   Mood:  euthymic   Affect:  anxious   Memory recent  adequate   Memory remote:  adequate   Concentration:  adequate   Abstraction:  concrete   Insight:  fair   Reliability fair   Judgment:  fair       Assessement & Diagnoses:  This is a 53-year-old,  Afro-American female, with history of anxiety and public speaking fear, was seen today to establish her mental health treatment here. Patient's public speaking fear has been worsening over past 18 months and her anxiety has been worse, working under her current  who is mean and demanding. Patient is not grossly manic or psychotic. She is basically happy person but appears a stressed out by current work situations. Primary diagnosis: Generalized anxiety disorder, phobia of public speaking    Secondary diagnosis: No personality disorder noted    Tertiary diagnosis: Multiple sclerosis, asthma    Strength & Weaknesses: Well educated, good job history, supportive  and family, no substance use disorder. Treatment Plan:   1. Medication: begin low-dose Paxil, increase propranolol to 60 mg, as needed before public presentation, begin trazodone 100 mg, half to one tablet at bedtime for sleep  2. Discussed: the potential medication side effects  dry mouth, GI disturbance, insomnia, libido decreased, weight gain, somnolence  patient given opportunity to ask questions  3. Psychotherapy: none recommended at this time  4. Medical: with PCP  5. Education: Pt was educated on her current clinical dx, prognosis and treatment. 6. Return to Clinic: Follow-up Disposition:  Return in about 4 weeks (around 11/21/2018). The risk versus benefits of treatment were discussed and side effects explained. Patient agreed with plan. Patient instructed to call with any side effects.      Time spent with Patient:  30 to 74 minutes    Niki Haddad MD  10/26/2018

## 2018-11-21 ENCOUNTER — OFFICE VISIT (OUTPATIENT)
Dept: BEHAVIORAL/MENTAL HEALTH CLINIC | Age: 48
End: 2018-11-21

## 2018-11-21 VITALS
HEART RATE: 67 BPM | WEIGHT: 154.6 LBS | HEIGHT: 66 IN | BODY MASS INDEX: 24.85 KG/M2 | SYSTOLIC BLOOD PRESSURE: 122 MMHG | DIASTOLIC BLOOD PRESSURE: 86 MMHG

## 2018-11-21 DIAGNOSIS — F41.1 GAD (GENERALIZED ANXIETY DISORDER): Primary | ICD-10-CM

## 2018-11-21 DIAGNOSIS — F40.10 SOCIAL PHOBIA INVOLVING FEAR OF PUBLIC SPEAKING: ICD-10-CM

## 2018-11-21 RX ORDER — PAROXETINE 10 MG/1
15 TABLET, FILM COATED ORAL DAILY
Qty: 45 TAB | Refills: 2 | Status: SHIPPED | OUTPATIENT
Start: 2018-11-21 | End: 2018-12-17 | Stop reason: SDUPTHER

## 2018-11-21 RX ORDER — KETOCONAZOLE 20 MG/ML
SHAMPOO TOPICAL
COMMUNITY
Start: 2018-10-09 | End: 2019-05-17 | Stop reason: SDUPTHER

## 2018-11-21 RX ORDER — BUSPIRONE HYDROCHLORIDE 10 MG/1
TABLET ORAL
COMMUNITY
Start: 2018-11-15 | End: 2018-11-21 | Stop reason: SINTOL

## 2018-11-21 RX ORDER — ALPRAZOLAM 0.5 MG/1
0.25 TABLET ORAL
Qty: 30 TAB | Refills: 1 | Status: SHIPPED | OUTPATIENT
Start: 2018-11-21 | End: 2018-12-12 | Stop reason: SDUPTHER

## 2018-11-21 RX ORDER — ESCITALOPRAM OXALATE 10 MG/1
TABLET ORAL
COMMUNITY
Start: 2018-10-09 | End: 2018-11-21

## 2018-11-21 RX ORDER — TRAZODONE HYDROCHLORIDE 50 MG/1
50 TABLET ORAL
Qty: 30 TAB | Refills: 1 | Status: SHIPPED | OUTPATIENT
Start: 2018-11-21 | End: 2019-01-14 | Stop reason: SDUPTHER

## 2018-11-21 NOTE — PROGRESS NOTES
Psychiatric Outpatient Progress Note    Account Number:  [de-identified]  Name: Soheila Malcolm    SUBJECTIVE:   CHIEF COMPLAINT:  Soheila Malcolm is a 57 y.o. , AA female and was seen today for her first follow-up of psychiatric condition and psychotropic medication management. HPI:    Malik Paulino reports the following psychiatric symptoms:  anxiety and insomnia. The symptoms have been present for years and are of moderate severity. The symptoms occur daily. Patient was seen alone. She was cooperative and pleasant. She reported not much change in her anxiety level. She was not able to tolerate trazodone due to hang over effects next day. She did not see much difference with high dose of propranolol. She stays anxious about big meetings and presentations. Denies any physical sx. Denies any psychosis or yecenia. Work remains extremely stressful. Her weight is stable. BMI = 25. BP is slightly elevated. HR is WNL. Reports compliance with medications. Initial Assessement & Diagnoses: (10/24/18)  This is a 61-year-old,  Afro-American female, with history of anxiety and public speaking fear, was seen today to establish her mental health treatment here. Patient's public speaking fear has been worsening over past 18 months and her anxiety has been worse, working under her current  who is mean and demanding. Patient is not grossly manic or psychotic. She is basically happy person but appears a stressed out by current work situations. Primary diagnosis: Generalized anxiety disorder, phobia of public speaking  Secondary diagnosis: No personality disorder noted  Tertiary diagnosis: Multiple sclerosis, asthma  Strength & Weaknesses: Well educated, good job history, supportive  and family, no substance use disorder.   Treatment Plan: Medication: begin low-dose Paxil, increase propranolol to 60 mg, as needed before public presentation, begin trazodone 100 mg, half to one tablet at bedtime for sleep    Contributing factors include: job    Patient denies SI/HI/SIB. Side Effects:  headache      Fam/Soc Hx (from [place] with updates):    Patient has been  for 6 years. She has 1 son who is 10years old. Patient has master's in healthcare administration. 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 Patient denies any legal problems. Patient denies any childhood abuse. Patient mother has anxiety/depression. Patient reported that her relationship with her  is very good.   Her son is in a good health    REVIEW OF SYSTEMS:  Constitutional: positive for fatigue  Eyes: positive for contacts/glasses  Ears, nose, mouth, throat, and face: negative for hearing loss and tinnitus  Respiratory: negative for cough or wheezing  Cardiovascular: negative for chest pain, palpitations  Gastrointestinal: negative for reflux symptoms and constipation  Genitourinary:negative for frequency and urinary incontinence  Musculoskeletal:negative for myalgias and arthralgias  Neurological: negative for headaches, seizures and memory problems  Behavioral/Psych: positive for anxiety and sleep disturbance, negative for SI or HI    Scales: (10/24/18)  PHQ-9 : 4 - WNL  HAM-A: 7 - WNL    Visit Vitals  /86   Pulse 67   Ht 5' 6\" (1.676 m)   Wt 70.1 kg (154 lb 9.6 oz)   LMP 11/01/2018   BMI 24.95 kg/m²       OBJECTIVE:                 Mental Status exam: WNL except for      Sensorium  oriented to time, place and person   Relations cooperative    Eye Contact    appropriate   Appearance:  age appropriate and casually dressed   Motor Behavior/Gait:  within normal limits   Speech:  normal pitch and normal volume   Thought Process: goal directed and logical   Thought Content free of delusions and free of hallucinations   Suicidal ideations none   Homicidal ideations none   Mood:  euthymic   Affect:  anxious   Memory recent  adequate   Memory remote:  adequate Concentration:  adequate   Abstraction:  concrete   Insight:  fair   Reliability fair   Judgment:  fair       MEDICAL DECISION MAKING  Data: pertinent labs, imaging, medical records and diagnostic tests reviewed and incorporated in diagnosis and treatment plan    No Known Allergies     Current Outpatient Medications   Medication Sig Dispense Refill    ketoconazole (NIZORAL) 2 % shampoo       PARoxetine (PAXIL) 10 mg tablet Take 1.5 Tabs by mouth daily. PLEASE DISPENSE PAXIL NOT Paroxetine per patent's preference 45 Tab 2    traZODone (DESYREL) 50 mg tablet Take 1 Tab by mouth nightly. Take 1/2 - 1 tab at bedtime 30 Tab 1    ALPRAZolam (XANAX) 0.5 mg tablet Take 0.5 Tabs by mouth daily as needed for Anxiety. 30 Tab 1    gabapentin (NEURONTIN) 300 mg capsule Take 2 Caps by mouth three (3) times daily. 540 Cap 3    glatiramer (COPAXONE) 20 mg/mL injection 20 mg by SubCUTAneous route daily. BRAND MEDICALLY NECESSARY 30 Syringe 11    OTHER Valerian Root qjs      albuterol (PROVENTIL HFA, VENTOLIN HFA, PROAIR HFA) 90 mcg/actuation inhaler Take 2 Puffs by inhalation every four (4) hours as needed.  dicyclomine (BENTYL) 20 mg tablet Take 1 Tab by mouth every six (6) hours as needed (abdominal cramps) for up to 20 doses. 20 Tab 0          Problems addressed today:    ICD-10-CM ICD-9-CM    1. DAE (generalized anxiety disorder) F41.1 300.02 ALPRAZolam (XANAX) 0.5 mg tablet   2. Social phobia involving fear of public speaking X29.44 881.53        Assessment:   Soheila Malcolm  is a 57 y.o.  female  is not responding to treatment. Symptoms are unstable. Patient denies SI/HI/SIB. No evidence of AH/VH or delusions. Risk Scoring- chronic illnesses and prescription drug management    Treatment Plan:  1.   Medications:          Medication Changes/Adjustments: D/c Propranolol                                                              Decrease dose of trazodone Increase Paxil slowly                                                              Start PRN Xanax - prior to big meetings/presentations    Current Outpatient Medications   Medication Sig Dispense Refill    ketoconazole (NIZORAL) 2 % shampoo       PARoxetine (PAXIL) 10 mg tablet Take 1.5 Tabs by mouth daily. PLEASE DISPENSE PAXIL NOT Paroxetine per patent's preference 45 Tab 2    traZODone (DESYREL) 50 mg tablet Take 1 Tab by mouth nightly. Take 1/2 - 1 tab at bedtime 30 Tab 1    ALPRAZolam (XANAX) 0.5 mg tablet Take 0.5 Tabs by mouth daily as needed for Anxiety. 30 Tab 1    gabapentin (NEURONTIN) 300 mg capsule Take 2 Caps by mouth three (3) times daily. 540 Cap 3    glatiramer (COPAXONE) 20 mg/mL injection 20 mg by SubCUTAneous route daily. BRAND MEDICALLY NECESSARY 30 Syringe 11    OTHER Valerian Root qjs      albuterol (PROVENTIL HFA, VENTOLIN HFA, PROAIR HFA) 90 mcg/actuation inhaler Take 2 Puffs by inhalation every four (4) hours as needed.  dicyclomine (BENTYL) 20 mg tablet Take 1 Tab by mouth every six (6) hours as needed (abdominal cramps) for up to 20 doses. 20 Tab 0                  The following regarding medications was addressed:    (The risks and benefits of the proposed medication; the potential medication side effects ie    dry mouth, weight gain, GI upset, headache; patient given opportunity to ask questions)       2. Counseling and coordination of care including instructions for treatment, risks/benefits, risk factor reduction and patient/family education. She agrees with the plan. Patient instructed to call with any side effects, questions or issues. 3.  Pt was provided supportive counseling for her stress of job.      PSYCHOTHERAPY:  approx 20 minutes  Type:  Supportive/Solution Focused psychotherapy provided  Focus:     Current problems:   Occupational issues   Medical issues    Psychoeducation provided:  Psych medications    Treatment plan reviewed with patient-including diagnosis and medications    Israel Ness is not progressing. Follow-up Disposition:  Return in about 4 weeks (around 12/19/2018).       Donovan Jin MD  11/21/2018

## 2018-12-12 ENCOUNTER — TELEPHONE (OUTPATIENT)
Dept: BEHAVIORAL/MENTAL HEALTH CLINIC | Age: 48
End: 2018-12-12

## 2018-12-12 DIAGNOSIS — F41.1 GAD (GENERALIZED ANXIETY DISORDER): ICD-10-CM

## 2018-12-12 RX ORDER — ALPRAZOLAM 1 MG/1
1 TABLET ORAL
Qty: 30 TAB | Refills: 1 | Status: SHIPPED | OUTPATIENT
Start: 2018-12-12 | End: 2019-02-04 | Stop reason: SDUPTHER

## 2018-12-17 ENCOUNTER — TELEPHONE (OUTPATIENT)
Dept: BEHAVIORAL/MENTAL HEALTH CLINIC | Age: 48
End: 2018-12-17

## 2018-12-17 RX ORDER — PAROXETINE 10 MG/1
15 TABLET, FILM COATED ORAL DAILY
Qty: 45 TAB | Refills: 1 | Status: SHIPPED | OUTPATIENT
Start: 2018-12-17 | End: 2019-04-26 | Stop reason: SDUPTHER

## 2018-12-17 NOTE — TELEPHONE ENCOUNTER
Pt says her Paroxetine has run out. You increased the dosage from 10 mg to 15 mg and she has run out of her refills. Can she have a refill with her correct dosage?

## 2018-12-19 ENCOUNTER — OFFICE VISIT (OUTPATIENT)
Dept: BEHAVIORAL/MENTAL HEALTH CLINIC | Age: 48
End: 2018-12-19

## 2018-12-19 VITALS
DIASTOLIC BLOOD PRESSURE: 90 MMHG | SYSTOLIC BLOOD PRESSURE: 133 MMHG | BODY MASS INDEX: 25.43 KG/M2 | HEART RATE: 67 BPM | WEIGHT: 158.2 LBS | HEIGHT: 66 IN

## 2018-12-19 DIAGNOSIS — F40.10 SOCIAL PHOBIA INVOLVING FEAR OF PUBLIC SPEAKING: ICD-10-CM

## 2018-12-19 DIAGNOSIS — F41.1 GAD (GENERALIZED ANXIETY DISORDER): Primary | ICD-10-CM

## 2018-12-19 RX ORDER — BUSPIRONE HYDROCHLORIDE 10 MG/1
TABLET ORAL
COMMUNITY
Start: 2018-10-19 | End: 2018-12-19

## 2018-12-19 NOTE — PROGRESS NOTES
Psychiatric Outpatient Progress Note    Account Number:  [de-identified]  Name: Mikey Sutton    SUBJECTIVE:   CHIEF COMPLAINT:  Mikey Sutton is a 57 y.o. , AA female and was seen today for her first follow-up of psychiatric condition and psychotropic medication management.      HPI:    Jelly Amezcua reports the following psychiatric symptoms:  anxiety and insomnia. The symptoms have been present for years and are of moderate severity. The symptoms occur daily.     Patient was seen alone. She was cooperative and pleasant. She reported that she has much improved and has significant decline in her anxiety and stress level. She is able to participate in big meeting and make presentation well. She continues to be undisciplined in her eating and does not eat 3 meals. She has put on weight. Sleep is better. Denies any physical sx. Denies any psychosis or yecenia. Work remains extremely stressful.      Her weight is up by 4 lbs. BMI = 26. BP/HR is WNL.  Reports compliance with medications.        Contributing factors include: job     Patient denies SI/HI/SIB.      Side Effects:  headache       Fam/Soc Hx (from [place] with updates):    Patient has been  for 6 years. Tomasa Campo has 1 son who is 10years old. Inna Rodriguez has master's in healthcare administration. 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 Inna Rodriguez denies any legal problems. Inna Rodriguez denies any childhood abuse.  Patient mother has anxiety/depression. Inna Rodriguez reported that her relationship with her  is very good. Rock Guerrero son is in a good health     REVIEW OF SYSTEMS:  Constitutional: positive for fatigue, weight gain  Eyes: positive for contacts/glasses  Ears, nose, mouth, throat, and face: negative for hearing loss and tinnitus  Respiratory: negative for cough or wheezing  Cardiovascular: negative for chest pain, palpitations  Gastrointestinal: negative for reflux symptoms and constipation  Genitourinary:negative for frequency and urinary incontinence  Musculoskeletal:negative for myalgias and arthralgias  Neurological: negative for headaches, seizures and memory problems  Behavioral/Psych: positive for anxiety and sleep disturbance, negative for SI or HI     Scales: (10/24/18)  PHQ-9 : 4 - WNL  HAM-A: 7 - WNL    Visit Vitals  /90   Pulse 67   Ht 5' 6\" (1.676 m)   Wt 71.8 kg (158 lb 3.2 oz)   LMP 12/04/2018   BMI 25.53 kg/m²       OBJECTIVE:                 Mental Status exam: WNL except for      Sensorium  oriented to time, place and person   Relations cooperative    Eye Contact    appropriate   Appearance:  age appropriate and casually dressed   Motor Behavior/Gait:  within normal limits   Speech:  normal pitch and normal volume   Thought Process: goal directed and logical   Thought Content free of delusions and free of hallucinations   Suicidal ideations none   Homicidal ideations none   Mood:  euthymic   Affect:  anxious   Memory recent  adequate   Memory remote:  adequate   Concentration:  adequate   Abstraction:  abstract   Insight:  good   Reliability good   Judgment:  good       MEDICAL DECISION MAKING  Data: pertinent labs, imaging, medical records and diagnostic tests reviewed and incorporated in diagnosis and treatment plan    No Known Allergies     Current Outpatient Medications   Medication Sig Dispense Refill    PARoxetine (PAXIL) 10 mg tablet Take 1.5 Tabs by mouth daily. PLEASE DISPENSE PAXIL NOT Paroxetine per patent's preference 45 Tab 1    ALPRAZolam (XANAX) 1 mg tablet Take 1 Tab by mouth daily as needed for Anxiety. (Patient taking differently: Take 2 mg by mouth daily as needed for Anxiety.) 30 Tab 1    ketoconazole (NIZORAL) 2 % shampoo       traZODone (DESYREL) 50 mg tablet Take 1 Tab by mouth nightly. Take 1/2 - 1 tab at bedtime 30 Tab 1    gabapentin (NEURONTIN) 300 mg capsule Take 2 Caps by mouth three (3) times daily.  540 Cap 3    glatiramer (COPAXONE) 20 mg/mL injection 20 mg by SubCUTAneous route daily. BRAND MEDICALLY NECESSARY 30 Syringe 11    albuterol (PROVENTIL HFA, VENTOLIN HFA, PROAIR HFA) 90 mcg/actuation inhaler Take 2 Puffs by inhalation every four (4) hours as needed. Problems addressed today:    ICD-10-CM ICD-9-CM    1. DAE (generalized anxiety disorder) F41.1 300.02    2. Social phobia involving fear of public speaking F44.68 232.83        Assessment:   Mello Garcia  is a 57 y.o.  female  is responding to treatment. Symptoms are improving. Patient denies SI/HI/SIB. No evidence of AH/VH or delusions. Risk Scoring- chronic illnesses and prescription drug management    Treatment Plan:  1. Medications:          Medication Changes/Adjustments: Continue Paxil, trazodone and PRN Xanax in the current dosages. Current Outpatient Medications   Medication Sig Dispense Refill    PARoxetine (PAXIL) 10 mg tablet Take 1.5 Tabs by mouth daily. PLEASE DISPENSE PAXIL NOT Paroxetine per patent's preference 45 Tab 1    ALPRAZolam (XANAX) 1 mg tablet Take 1 Tab by mouth daily as needed for Anxiety. (Patient taking differently: Take 2 mg by mouth daily as needed for Anxiety.) 30 Tab 1    ketoconazole (NIZORAL) 2 % shampoo       traZODone (DESYREL) 50 mg tablet Take 1 Tab by mouth nightly. Take 1/2 - 1 tab at bedtime 30 Tab 1    gabapentin (NEURONTIN) 300 mg capsule Take 2 Caps by mouth three (3) times daily. 540 Cap 3    glatiramer (COPAXONE) 20 mg/mL injection 20 mg by SubCUTAneous route daily. BRAND MEDICALLY NECESSARY 30 Syringe 11    albuterol (PROVENTIL HFA, VENTOLIN HFA, PROAIR HFA) 90 mcg/actuation inhaler Take 2 Puffs by inhalation every four (4) hours as needed. The following regarding medications was addressed:    (The risks and benefits of the proposed medication; the potential medication side effects ie    dry mouth, weight gain, GI upset, headache; patient given opportunity to ask questions)       2.   Counseling and coordination of care including instructions for treatment, risks/benefits, risk factor reduction and patient/family education. She agrees with the plan. Patient instructed to call with any side effects, questions or issues. 3.  Pt was provided supportive counseling for her stressful job situation. She was educated on healthy eating habits and get on good exercise routines. PSYCHOTHERAPY:  approx 20 minutes  Type:  Supportive/Cognitive Behavioral/Solution Focused psychotherapy provided  Focus:     Current problems:   Occupational issues   Academic issues   Medical issues    Psychoeducation provided:  Psych medications    Treatment plan reviewed with patient-including diagnosis and medications    Anahy Lopez is progressing. Follow-up Disposition:  Return in about 2 months (around 2/19/2019).       Ana Hernandez MD  12/19/2018

## 2018-12-20 ENCOUNTER — TELEPHONE (OUTPATIENT)
Dept: BEHAVIORAL/MENTAL HEALTH CLINIC | Age: 48
End: 2018-12-20

## 2018-12-22 RX ORDER — PAROXETINE 10 MG/1
TABLET, FILM COATED ORAL
Qty: 30 TAB | Refills: 1 | Status: SHIPPED | OUTPATIENT
Start: 2018-12-22 | End: 2019-03-22 | Stop reason: SDUPTHER

## 2019-01-14 RX ORDER — TRAZODONE HYDROCHLORIDE 50 MG/1
TABLET ORAL
Qty: 30 TAB | Refills: 1 | Status: SHIPPED | OUTPATIENT
Start: 2019-01-14 | End: 2019-03-09 | Stop reason: SDUPTHER

## 2019-01-31 ENCOUNTER — OFFICE VISIT (OUTPATIENT)
Dept: FAMILY MEDICINE CLINIC | Age: 49
End: 2019-01-31

## 2019-01-31 VITALS
DIASTOLIC BLOOD PRESSURE: 84 MMHG | BODY MASS INDEX: 24.94 KG/M2 | RESPIRATION RATE: 16 BRPM | WEIGHT: 155.2 LBS | SYSTOLIC BLOOD PRESSURE: 126 MMHG | HEIGHT: 66 IN | TEMPERATURE: 97 F | HEART RATE: 96 BPM | OXYGEN SATURATION: 100 %

## 2019-01-31 DIAGNOSIS — J02.9 SORE THROAT: ICD-10-CM

## 2019-01-31 DIAGNOSIS — J06.9 VIRAL URI WITH COUGH: Primary | ICD-10-CM

## 2019-01-31 DIAGNOSIS — R06.2 WHEEZING: ICD-10-CM

## 2019-01-31 LAB
FLUAV+FLUBV AG NOSE QL IA.RAPID: NEGATIVE POS/NEG
FLUAV+FLUBV AG NOSE QL IA.RAPID: NEGATIVE POS/NEG
S PYO AG THROAT QL: NEGATIVE
VALID INTERNAL CONTROL?: YES
VALID INTERNAL CONTROL?: YES

## 2019-01-31 RX ORDER — ALBUTEROL SULFATE 90 UG/1
1-2 AEROSOL, METERED RESPIRATORY (INHALATION)
Qty: 1 INHALER | Refills: 0 | Status: SHIPPED | OUTPATIENT
Start: 2019-01-31 | End: 2020-03-31

## 2019-01-31 NOTE — PROGRESS NOTES
Chief Complaint   Patient presents with    Flu Like Symptoms     Congestion, body aches and fever since Tuesday, feels worse today, headache using Advil, sore throat 4/10

## 2019-01-31 NOTE — PATIENT INSTRUCTIONS
Viral Respiratory Infection: Care Instructions  Your Care Instructions    Viruses are very small organisms. They grow in number after they enter your body. There are many types that cause different illnesses, such as colds and the mumps. The symptoms of a viral respiratory infection often start quickly. They include a fever, sore throat, and runny nose. You may also just not feel well. Or you may not want to eat much. Most viral respiratory infections are not serious. They usually get better with time and self-care. Antibiotics are not used to treat a viral infection. That's because antibiotics will not help cure a viral illness. In some cases, antiviral medicine can help your body fight a serious viral infection. Follow-up care is a key part of your treatment and safety. Be sure to make and go to all appointments, and call your doctor if you are having problems. It's also a good idea to know your test results and keep a list of the medicines you take. How can you care for yourself at home? · Rest as much as possible until you feel better. · Be safe with medicines. Take your medicine exactly as prescribed. Call your doctor if you think you are having a problem with your medicine. You will get more details on the specific medicine your doctor prescribes. · Take an over-the-counter pain medicine, such as acetaminophen (Tylenol), ibuprofen (Advil, Motrin), or naproxen (Aleve), as needed for pain and fever. Read and follow all instructions on the label. Do not give aspirin to anyone younger than 20. It has been linked to Reye syndrome, a serious illness. · Drink plenty of fluids, enough so that your urine is light yellow or clear like water. Hot fluids, such as tea or soup, may help relieve congestion in your nose and throat. If you have kidney, heart, or liver disease and have to limit fluids, talk with your doctor before you increase the amount of fluids you drink.   · Try to clear mucus from your lungs by breathing deeply and coughing. · Gargle with warm salt water once an hour. This can help reduce swelling and throat pain. Use 1 teaspoon of salt mixed in 1 cup of warm water. · Do not smoke or allow others to smoke around you. If you need help quitting, talk to your doctor about stop-smoking programs and medicines. These can increase your chances of quitting for good. To avoid spreading the virus  · Cough or sneeze into a tissue. Then throw the tissue away. · If you don't have a tissue, use your hand to cover your cough or sneeze. Then clean your hand. You can also cough into your sleeve. · Wash your hands often. Use soap and warm water. Wash for 15 to 20 seconds each time. · If you don't have soap and water near you, you can clean your hands with alcohol wipes or gel. When should you call for help? Call your doctor now or seek immediate medical care if:    · You have a new or higher fever.     · Your fever lasts more than 48 hours.     · You have trouble breathing.     · You have a fever with a stiff neck or a severe headache.     · You are sensitive to light.     · You feel very sleepy or confused.    Watch closely for changes in your health, and be sure to contact your doctor if:    · You do not get better as expected. Where can you learn more? Go to http://marv-uvaldo.info/. Enter E865 in the search box to learn more about \"Viral Respiratory Infection: Care Instructions. \"  Current as of: September 5, 2018  Content Version: 11.9  © 7785-0405 [employer]. Care instructions adapted under license by [employer] (which disclaims liability or warranty for this information). If you have questions about a medical condition or this instruction, always ask your healthcare professional. [address] any warranty or liability for your use of this information.

## 2019-01-31 NOTE — PROGRESS NOTES
Subjective: Ferdinand Godwin is a 57 y.o. female who complains of coryza, sore throat, productive cough, myalgias and headache for 2 days, gradually worsening since that time. Pt reports low grade fever 99.7 this morning and chills. Denies any nausea, vomiting, or diarrhea. Pt had flu shot. Pt's son with similar symptoms. She did have some wheezing last night. History of mild asthma and needs albuterol inh refill. Evaluation to date: none. Treatment to date: decongestants, antihistamines, mucinex. Patient does not smoke cigarettes. Relevant PMH:   Past Medical History:   Diagnosis Date    Asthma     MS (multiple sclerosis) ([facility] [address]      Past Surgical History:   Procedure Laterality Date    HX APPENDECTOMY      HX CHOLECYSTECTOMY      HX GYN      c/sec     No Known Allergies        Review of Systems  Pertinent items are noted in HPI. Objective:     Visit Vitals  /84   Pulse 96   Temp 97 °F (36.1 °C) (Oral)   Resp 16   Ht 5' 6\" (1.676 m)   Wt 155 lb 3.2 oz (70.4 kg)   LMP 01/11/2019 (Exact Date)   SpO2 100%   BMI 25.05 kg/m²     General:  alert, cooperative, no distress   Eyes: negative   Ears: normal TM's and external ear canals AU   Sinuses: Normal paranasal sinuses without tenderness   Mouth:  Lips, mucosa, and tongue normal. Teeth and gums normal and normal findings: oropharynx pink & moist without lesions or evidence of thrush   Neck: supple, symmetrical, trachea midline and no adenopathy. Heart: S1 and S2 normal, no murmurs noted. Lungs: clear to auscultation bilaterally, no wheezing at this time.         Results for orders placed or performed in visit on 01/31/19   AMB POC MARY INFLUENZA A/B TEST   Result Value Ref Range    VALID INTERNAL CONTROL POC Yes     Influenza A Ag POC Negative Negative Pos/Neg    Influenza B Ag POC Negative Negative Pos/Neg   AMB POC RAPID STREP A   Result Value Ref Range    VALID INTERNAL CONTROL POC Yes     Group A Strep Ag Negative Negative Assessment/Plan:       ICD-10-CM ICD-9-CM    1. Viral URI with cough J06.9 465.9 AMB POC MARY INFLUENZA A/B TEST    B97.89     2. Sore throat J02.9 462 AMB POC MARY INFLUENZA A/B TEST      AMB POC RAPID STREP A   3. Wheezing R06.2 786.07      Orders Placed This Encounter    AMB POC MARY INFLUENZA A/B TEST    AMB POC RAPID STREP A    albuterol (PROVENTIL HFA, VENTOLIN HFA, PROAIR HFA) 90 mcg/actuation inhaler     Suggested symptomatic OTC remedies. RTC prn. Discussed diagnosis and treatment of viral URIs. Lenn Memory, NP  This note will not be viewable in [address]

## 2019-02-04 ENCOUNTER — OFFICE VISIT (OUTPATIENT)
Dept: BEHAVIORAL/MENTAL HEALTH CLINIC | Age: 49
End: 2019-02-04

## 2019-02-04 VITALS
DIASTOLIC BLOOD PRESSURE: 89 MMHG | BODY MASS INDEX: 24.36 KG/M2 | SYSTOLIC BLOOD PRESSURE: 124 MMHG | HEART RATE: 70 BPM | HEIGHT: 66 IN | WEIGHT: 151.6 LBS

## 2019-02-04 DIAGNOSIS — F40.10 SOCIAL PHOBIA INVOLVING FEAR OF PUBLIC SPEAKING: ICD-10-CM

## 2019-02-04 DIAGNOSIS — F41.1 GAD (GENERALIZED ANXIETY DISORDER): Primary | ICD-10-CM

## 2019-02-04 RX ORDER — ALPRAZOLAM 2 MG/1
2 TABLET ORAL
Qty: 30 TAB | Refills: 2 | Status: SHIPPED | OUTPATIENT
Start: 2019-02-04 | End: 2019-04-26 | Stop reason: SDUPTHER

## 2019-02-04 NOTE — PROGRESS NOTES
Psychiatric Outpatient Progress Note    Account Number:  [de-identified]  Name: Rebecca Menendez    SUBJECTIVE:   CHIEF COMPLAINT:  Maria Eugenia Randle is a 57 y.o. , AA female and was seen today for her 2 month follow-up of psychiatric condition and psychotropic medication management.      HPI:    Fernando reports the following psychiatric symptoms:  anxiety and insomnia.  The symptoms have been present for years and are of moderate severity. The symptoms occur daily.     Patient was seen alone. She was cooperative and pleasant. She reported that she has improved and has significant decline in her anxiety and stress level. She is using higher dose of Xanax prior to her big meetings or presentations and is doing better. She is trying to be more disciplined in her eating and has lost weight. Sleep is better. Denies any physical sx. Denies any psychosis or yecenia. Work remains extremely stressful.      She has lost 7 lbs. lbs. BMI = 24. BP/HR is WNL.  Reports compliance with medications.        Contributing factors include: job     Patient denies SI/HI/SIB.      Side Effects:  headache       Fam/Soc Hx (from [facility] with updates):    Patient has been  for 6 years. Dany Cohn has 1 son who is 10years old. Letitiailia Bo has master's in healthcare administration. 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 Annamarie Bo denies any legal problems. Annamarie Anupam denies any childhood abuse.  Patient mother has anxiety/depression. Annamarie Valdezmiguelangel reported that her relationship with her  is very good. Ples Postin son is in a good health     REVIEW OF SYSTEMS:  Constitutional: positive for fatigue, weight gain  Eyes: positive for contacts/glasses  Ears, nose, mouth, throat, and face: negative for hearing loss and tinnitus  Respiratory: negative for cough or wheezing  Cardiovascular: negative for chest pain, palpitations  Gastrointestinal: negative for reflux symptoms and constipation  Genitourinary:negative for frequency and urinary incontinence  Musculoskeletal:negative for myalgias and arthralgias  Neurological: negative for headaches, seizures and memory problems  Behavioral/Psych: positive for anxiety and sleep disturbance, negative for SI or HI     Scales: (10/24/18)  PHQ-9 : 4 - WNL  HAM-A: 7 - WNL    Visit Vitals  /89   Pulse 70   Ht 5' 6\" (1.676 m)   Wt 68.8 kg (151 lb 9.6 oz)   LMP 01/11/2019 (Exact Date)   BMI 24.47 kg/m²       OBJECTIVE:                 Mental Status exam: WNL except for      Sensorium  oriented to time, place and person   Relations cooperative and passive    Eye Contact    appropriate   Appearance:  age appropriate and well dressed   Motor Behavior/Gait:  within normal limits   Speech:  normal pitch and normal volume   Thought Process: goal directed and logical   Thought Content free of delusions and free of hallucinations   Suicidal ideations none   Homicidal ideations none   Mood:  euthymic   Affect:  full range   Memory recent  adequate   Memory remote:  adequate   Concentration:  adequate   Abstraction:  concrete   Insight:  fair   Reliability fair   Judgment:  fair       MEDICAL DECISION MAKING  Data: pertinent labs, imaging, medical records and diagnostic tests reviewed and incorporated in diagnosis and treatment plan    No Known Allergies     Current Outpatient Medications   Medication Sig Dispense Refill    ALPRAZolam (XANAX) 2 mg tablet Take 1 Tab by mouth daily as needed for Anxiety. 30 Tab 2    albuterol (PROVENTIL HFA, VENTOLIN HFA, PROAIR HFA) 90 mcg/actuation inhaler Take 1-2 Puffs by inhalation every four (4) hours as needed. 1 Inhaler 0    traZODone (DESYREL) 50 mg tablet TAKE 1/2 - 1 TAB BY MOUTH AT BEDTIME 30 Tab 1    PARoxetine (PAXIL) 10 mg tablet Take 1.5 Tabs by mouth daily.  PLEASE DISPENSE PAXIL NOT Paroxetine per patent's preference 45 Tab 1    ketoconazole (NIZORAL) 2 % shampoo       gabapentin (NEURONTIN) 300 mg capsule Take 2 Caps by mouth three (3) times daily. 540 Cap 3    glatiramer (COPAXONE) 20 mg/mL injection 20 mg by SubCUTAneous route daily. BRAND MEDICALLY NECESSARY 30 Syringe 11    PARoxetine (PAXIL) 10 mg tablet TAKE 1 TABLET BY MOUTH EVERY DAY 30 Tab 1          Problems addressed today:    ICD-10-CM ICD-9-CM    1. DAE (generalized anxiety disorder) F41.1 300.02 ALPRAZolam (XANAX) 2 mg tablet   2. Social phobia involving fear of public speaking D71.05 763.08        Assessment:   Rebecca Menendez  is a 57 y.o.  female  is responding to treatment. Symptoms are improving. Patient denies SI/HI/SIB. No evidence of AH/VH or delusions. Risk Scoring- chronic illnesses and prescription drug management    Treatment Plan:  1. Medications:          Medication Changes/Adjustments: Increase Xanax 2 mg, 1 PO on as need basis                                                              Continue Paxil and trazodone in the current dosages    Current Outpatient Medications   Medication Sig Dispense Refill    ALPRAZolam (XANAX) 2 mg tablet Take 1 Tab by mouth daily as needed for Anxiety. 30 Tab 2    albuterol (PROVENTIL HFA, VENTOLIN HFA, PROAIR HFA) 90 mcg/actuation inhaler Take 1-2 Puffs by inhalation every four (4) hours as needed. 1 Inhaler 0    traZODone (DESYREL) 50 mg tablet TAKE 1/2 - 1 TAB BY MOUTH AT BEDTIME 30 Tab 1    PARoxetine (PAXIL) 10 mg tablet Take 1.5 Tabs by mouth daily. PLEASE DISPENSE PAXIL NOT Paroxetine per patent's preference 45 Tab 1    ketoconazole (NIZORAL) 2 % shampoo       gabapentin (NEURONTIN) 300 mg capsule Take 2 Caps by mouth three (3) times daily. 540 Cap 3    glatiramer (COPAXONE) 20 mg/mL injection 20 mg by SubCUTAneous route daily.  BRAND MEDICALLY NECESSARY 30 Syringe 11    PARoxetine (PAXIL) 10 mg tablet TAKE 1 TABLET BY MOUTH EVERY DAY 30 Tab 1                  The following regarding medications was addressed:    (The risks and benefits of the proposed medication; the potential medication side effects ie    dry mouth, weight gain, GI upset, headache; patient given opportunity to ask questions)       2. Counseling and coordination of care including instructions for treatment, risks/benefits, risk factor reduction and patient/family education. She agrees with the plan. Patient instructed to call with any side effects, questions or issues. PSYCHOTHERAPY:  approx 20 minutes  Type:  Supportive/Solution Focused psychotherapy provided  Focus:     Current problems:   Occupational issues   Medical issues    Psychoeducation provided:  Addictive potential of Xanax. Treatment plan reviewed with patient-including diagnosis and medications    Bernicechon Hiro is progressing. Follow-up Disposition:  Return in about 2 months (around 4/4/2019).       Linda Be MD  2/4/2019

## 2019-02-17 RX ORDER — PAROXETINE 10 MG/1
TABLET, FILM COATED ORAL
Qty: 45 TAB | Refills: 1 | Status: SHIPPED | OUTPATIENT
Start: 2019-02-17 | End: 2019-03-22 | Stop reason: SDUPTHER

## 2019-02-19 ENCOUNTER — OFFICE VISIT (OUTPATIENT)
Dept: FAMILY MEDICINE CLINIC | Age: 49
End: 2019-02-19

## 2019-02-19 VITALS
WEIGHT: 153 LBS | HEIGHT: 66 IN | TEMPERATURE: 98.4 F | SYSTOLIC BLOOD PRESSURE: 118 MMHG | DIASTOLIC BLOOD PRESSURE: 85 MMHG | OXYGEN SATURATION: 100 % | RESPIRATION RATE: 16 BRPM | BODY MASS INDEX: 24.59 KG/M2 | HEART RATE: 88 BPM

## 2019-02-19 DIAGNOSIS — K52.9 GASTROENTERITIS: Primary | ICD-10-CM

## 2019-02-19 RX ORDER — ONDANSETRON 4 MG/1
4 TABLET, ORALLY DISINTEGRATING ORAL
Qty: 12 TAB | Refills: 0 | Status: SHIPPED | OUTPATIENT
Start: 2019-02-19 | End: 2019-03-22

## 2019-02-19 NOTE — PATIENT INSTRUCTIONS
Gastroenteritis: Care Instructions  Your Care Instructions    Gastroenteritis is an illness that may cause nausea, vomiting, and diarrhea. It is sometimes called \"stomach flu. \" It can be caused by bacteria or a virus. You will probably begin to feel better in 1 to 2 days. In the meantime, get plenty of rest and make sure you do not become dehydrated. Dehydration occurs when your body loses too much fluid. Follow-up care is a key part of your treatment and safety. Be sure to make and go to all appointments, and call your doctor if you are having problems. It's also a good idea to know your test results and keep a list of the medicines you take. How can you care for yourself at home? · If your doctor prescribed antibiotics, take them as directed. Do not stop taking them just because you feel better. You need to take the full course of antibiotics. · Drink plenty of fluids to prevent dehydration, enough so that your urine is light yellow or clear like water. Choose water and other caffeine-free clear liquids until you feel better. If you have kidney, heart, or liver disease and have to limit fluids, talk with your doctor before you increase your fluid intake. · Drink fluids slowly, in frequent, small amounts, because drinking too much too fast can cause vomiting. · Begin eating mild foods, such as dry toast, yogurt, applesauce, bananas, and rice. Avoid spicy, hot, or high-fat foods, and do not drink alcohol or caffeine for a day or two. Do not drink milk or eat ice cream until you are feeling better. How to prevent gastroenteritis  · Keep hot foods hot and cold foods cold. · Do not eat meats, dressings, salads, or other foods that have been kept at room temperature for more than 2 hours. · Use a thermometer to check your refrigerator. It should be between 34°F and 40°F.  · Defrost meats in the refrigerator or microwave, not on the kitchen counter. · Keep your hands and your kitchen clean.  Wash your hands, cutting boards, and countertops with hot soapy water frequently. · Cook meat until it is well done. · Do not eat raw eggs or uncooked sauces made with raw eggs. · Do not take chances. If food looks or tastes spoiled, throw it out. When should you call for help? Call 911 anytime you think you may need emergency care. For example, call if:    · You vomit blood or what looks like coffee grounds.     · You passed out (lost consciousness).     · You pass maroon or very bloody stools.    Call your doctor now or seek immediate medical care if:    · You have severe belly pain.     · You have signs of needing more fluids. You have sunken eyes, a dry mouth, and pass only a little dark urine.     · You feel like you are going to faint.     · You have increased belly pain that does not go away in 1 to 2 days.     · You have new or increased nausea, or you are vomiting.     · You have a new or higher fever.     · Your stools are black and tarlike or have streaks of blood.    Watch closely for changes in your health, and be sure to contact your doctor if:    · You are dizzy or lightheaded.     · You urinate less than usual, or your urine is dark yellow or brown.     · You do not feel better with each day that goes by. Where can you learn more? Go to http://marv-uvaldo.info/. Enter N142 in the search box to learn more about \"Gastroenteritis: Care Instructions. \"  Current as of: July 30, 2018  Content Version: 11.9  © 1464-9652 [employer]. Care instructions adapted under license by [employer] (which disclaims liability or warranty for this information). If you have questions about a medical condition or this instruction, always ask your healthcare professional. [place] [address] any warranty or liability for your use of this information.

## 2019-02-19 NOTE — PROGRESS NOTES
Subjective:      Patient : This patient is a 57 y.o. female with chief complaint of Nausea, vomiting and diarrhea. The symptoms began >24 hours ago and have improved. The symptoms were rapid  in onset. The patient states the stools have been loose. The stools are brown  The patient had vomitting. The emesis was yellow. t is now  yellow. The patient has not complaint of abdominal pain . She has not been on any foreign travel. The patient has not tried OTCs for relief of nausea or diarrhea at home for his symptoms without relief. She rates her symtoms as moderate. Objective:     ROS:   Feeling ill. No dyspnea or chest pain on exertion. No abdominal pain, black or bloody stools. No urinary tract symptoms. GYN ROS: normal menses, no abnormal bleeding, pelvic pain or discharge, no breast pain or new or enlarging lumps on self exam. No neurological complaints. OBJECTIVE:   The patient appears well, alert, oriented x 3, in no distress. Visit Vitals  /85   Pulse 88   Temp 98.4 °F (36.9 °C) (Oral)   Resp 16   Ht 5' 6\" (1.676 m)   Wt 153 lb (69.4 kg)   LMP 01/11/2019   SpO2 100%   BMI 24.69 kg/m²     HEENT:ENT normal.  Neck supple. No adenopathy or thyromegaly. RGEINE. Chest: Lungs are clear, good air entry, no wheezes, rhonchi or rales. Cardiovascular: S1 and S2 normal, no murmurs, regular rate and rhythm. Abdomen: soft without tenderness, guarding, mass or organomegaly. Extremities: show no edema, normal peripheral pulses. Neurological: is normal, no focal findings. Assessment/Plan:       ICD-10-CM ICD-9-CM    1. Gastroenteritis K52.9 558.9 ondansetron (ZOFRAN ODT) 4 mg disintegrating tablet   . clear liquids and start bland diet as tolerated.

## 2019-03-10 RX ORDER — TRAZODONE HYDROCHLORIDE 50 MG/1
TABLET ORAL
Qty: 30 TAB | Refills: 1 | Status: SHIPPED | OUTPATIENT
Start: 2019-03-10 | End: 2019-03-22 | Stop reason: SDUPTHER

## 2019-03-22 ENCOUNTER — OFFICE VISIT (OUTPATIENT)
Dept: BEHAVIORAL/MENTAL HEALTH CLINIC | Age: 49
End: 2019-03-22

## 2019-03-22 VITALS
BODY MASS INDEX: 23.63 KG/M2 | DIASTOLIC BLOOD PRESSURE: 90 MMHG | WEIGHT: 147 LBS | SYSTOLIC BLOOD PRESSURE: 124 MMHG | HEART RATE: 88 BPM | HEIGHT: 66 IN

## 2019-03-22 DIAGNOSIS — F40.10 SOCIAL PHOBIA INVOLVING FEAR OF PUBLIC SPEAKING: ICD-10-CM

## 2019-03-22 DIAGNOSIS — F41.1 GAD (GENERALIZED ANXIETY DISORDER): Primary | ICD-10-CM

## 2019-03-22 RX ORDER — TRAZODONE HYDROCHLORIDE 100 MG/1
50 TABLET ORAL
COMMUNITY
Start: 2019-03-14 | End: 2019-04-26

## 2019-03-22 RX ORDER — LATANOPROST 50 UG/ML
SOLUTION/ DROPS OPHTHALMIC
COMMUNITY
Start: 2019-02-26

## 2019-03-22 NOTE — PROGRESS NOTES
Psychiatric Outpatient Progress Note    Account Number:  [de-identified]  Name: Merceda Primrose    SUBJECTIVE:   CHIEF COMPLAINT:  Fariba Randle is a 57 y.o. , AA female and was seen today for her 4 week follow-up of psychiatric condition and psychotropic medication management.      HPI:    Fernando reports the following psychiatric symptoms:  anxiety and insomnia.  The symptoms have been present for years and are of moderate severity. The symptoms occur daily.     Patient was seen alone. She was cooperative and pleasant. She reported that she is better and  has significant decline in her anxiety and stress level. She continues to take higher dose of Xanax prior to her big meetings or presentations and is doing better. She is trying to be more disciplined in her eating and has lost weight. Sleep is better with small dose of trazodone. Denies any physical sx. Denies any psychosis or yecenia. Work remains extremely stressful. She is interviewing at [employer] for job.      She has lost 4 lbs. lbs. BMI = 24. BP/HR is WNL.  Reports compliance with medications.        Contributing factors include: job     Patient denies SI/HI/SIB.      Side Effects:  headache       Fam/Soc Hx (from [facility] Eval with updates):    Patient has been  for 6 years. Cody Bernstein has 1 son who is 10years old. Mellissa Mejía has master's in healthcare administration. 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 Mellissa Mejía denies any legal problems. Mellissa Mejía denies any childhood abuse.  Patient mother has anxiety/depression. Mellissa Mejía reported that her relationship with her  is very good. Seble Hidalgo son is in a good health     REVIEW OF SYSTEMS:  Constitutional: positive for fatigue, weight loss  Eyes: positive for contacts/glasses  Ears, nose, mouth, throat, and face: negative for hearing loss and tinnitus  Respiratory: negative for cough or wheezing  Cardiovascular: negative for chest pain, palpitations  Gastrointestinal: negative for reflux symptoms and constipation  Genitourinary:negative for frequency and urinary incontinence  Musculoskeletal:negative for myalgias and arthralgias  Neurological: negative for headaches, seizures and memory problems  Behavioral/Psych: positive for anxiety and sleep disturbance, negative for SI or HI     Scales: (10/24/18)    (3/22/19)  PHQ-9 : 4 - WNL       9/15 - worse  HAM-A: 7 - WNL        5      - improved      Visit Vitals  /90   Pulse 88   Ht 5' 6\" (1.676 m)   Wt 66.7 kg (147 lb)   LMP 03/08/2019   BMI 23.73 kg/m²       OBJECTIVE:                 Mental Status exam: WNL except for      Sensorium  oriented to time, place and person   Relations cooperative    Eye Contact    appropriate   Appearance:  age appropriate and casually dressed   Motor Behavior/Gait:  within normal limits   Speech:  normal pitch and normal volume   Thought Process: goal directed and logical   Thought Content free of delusions and free of hallucinations   Suicidal ideations none   Homicidal ideations none   Mood:  euthymic   Affect:  full range   Memory recent  adequate   Memory remote:  adequate   Concentration:  adequate   Abstraction:  abstract   Insight:  good   Reliability good   Judgment:  good       MEDICAL DECISION MAKING  Data: pertinent labs, imaging, medical records and diagnostic tests reviewed and incorporated in diagnosis and treatment plan    No Known Allergies     Current Outpatient Medications   Medication Sig Dispense Refill    traZODone (DESYREL) 100 mg tablet Take 50 mg by mouth nightly.  latanoprost (XALATAN) 0.005 % ophthalmic solution       ALPRAZolam (XANAX) 2 mg tablet Take 1 Tab by mouth daily as needed for Anxiety. 30 Tab 2    albuterol (PROVENTIL HFA, VENTOLIN HFA, PROAIR HFA) 90 mcg/actuation inhaler Take 1-2 Puffs by inhalation every four (4) hours as needed. 1 Inhaler 0    PARoxetine (PAXIL) 10 mg tablet Take 1.5 Tabs by mouth daily.  PLEASE DISPENSE PAXIL NOT Paroxetine per patent's preference 45 Tab 1    ketoconazole (NIZORAL) 2 % shampoo       gabapentin (NEURONTIN) 300 mg capsule Take 2 Caps by mouth three (3) times daily. 540 Cap 3    glatiramer (COPAXONE) 20 mg/mL injection 20 mg by SubCUTAneous route daily. BRAND MEDICALLY NECESSARY 30 Syringe 11          Problems addressed today:    ICD-10-CM ICD-9-CM    1. DAE (generalized anxiety disorder) F41.1 300.02    2. Social phobia involving fear of public speaking M65.42 176.07        Assessment:   Elaine Saucedo  is a 57 y.o.  female  is responding to treatment. Symptoms are improving. Patient denies SI/HI/SIB. No evidence of AH/VH or delusions. Risk Scoring- chronic illnesses and prescription drug management    Treatment Plan:  1. Medications:          Medication Changes/Adjustments: continue combination of Paxil, Xanax and trazodone in the current dosages. Current Outpatient Medications   Medication Sig Dispense Refill    traZODone (DESYREL) 100 mg tablet Take 50 mg by mouth nightly.  latanoprost (XALATAN) 0.005 % ophthalmic solution       ALPRAZolam (XANAX) 2 mg tablet Take 1 Tab by mouth daily as needed for Anxiety. 30 Tab 2    albuterol (PROVENTIL HFA, VENTOLIN HFA, PROAIR HFA) 90 mcg/actuation inhaler Take 1-2 Puffs by inhalation every four (4) hours as needed. 1 Inhaler 0    PARoxetine (PAXIL) 10 mg tablet Take 1.5 Tabs by mouth daily. PLEASE DISPENSE PAXIL NOT Paroxetine per patent's preference 45 Tab 1    ketoconazole (NIZORAL) 2 % shampoo       gabapentin (NEURONTIN) 300 mg capsule Take 2 Caps by mouth three (3) times daily. 540 Cap 3    glatiramer (COPAXONE) 20 mg/mL injection 20 mg by SubCUTAneous route daily.  BRAND MEDICALLY NECESSARY 30 Syringe 11                  The following regarding medications was addressed:    (The risks and benefits of the proposed medication; the potential medication side effects ie    dry mouth, weight gain, GI upset, headache; patient given opportunity to ask questions)       2. Counseling and coordination of care including instructions for treatment, risks/benefits, risk factor reduction and patient/family education. She agrees with the plan. Patient instructed to call with any side effects, questions or issues. 3.  Pt was provided supportive counseling for her work related stress/ frustration. PSYCHOTHERAPY:  approx 20 minutes  Type:  Supportive/Solution Focused psychotherapy provided  Focus:     Current problems:   Medical issues              Work stress    Psychoeducation provided: psych medications. Treatment plan reviewed with patient-including diagnosis and medications    Draperkoki Davidsonbeltran is progressing. Follow-up and Dispositions    · Return in about 1 month (around 4/19/2019).            Kay Vegas MD  3/22/2019

## 2019-04-10 RX ORDER — PAROXETINE 10 MG/1
TABLET, FILM COATED ORAL
Qty: 45 TAB | Refills: 0 | Status: SHIPPED | OUTPATIENT
Start: 2019-04-10 | End: 2019-05-10 | Stop reason: SDUPTHER

## 2019-04-10 RX ORDER — TRAZODONE HYDROCHLORIDE 100 MG/1
TABLET ORAL
Qty: 30 TAB | Refills: 1 | Status: SHIPPED | OUTPATIENT
Start: 2019-04-10 | End: 2019-06-07 | Stop reason: SDUPTHER

## 2019-04-26 ENCOUNTER — OFFICE VISIT (OUTPATIENT)
Dept: BEHAVIORAL/MENTAL HEALTH CLINIC | Age: 49
End: 2019-04-26

## 2019-04-26 VITALS
WEIGHT: 147 LBS | BODY MASS INDEX: 23.63 KG/M2 | DIASTOLIC BLOOD PRESSURE: 86 MMHG | HEART RATE: 73 BPM | SYSTOLIC BLOOD PRESSURE: 121 MMHG | HEIGHT: 66 IN

## 2019-04-26 DIAGNOSIS — F41.1 GAD (GENERALIZED ANXIETY DISORDER): Primary | ICD-10-CM

## 2019-04-26 DIAGNOSIS — F40.10 SOCIAL PHOBIA INVOLVING FEAR OF PUBLIC SPEAKING: ICD-10-CM

## 2019-04-26 RX ORDER — ALPRAZOLAM 2 MG/1
2 TABLET ORAL
Qty: 30 TAB | Refills: 3 | Status: SHIPPED | OUTPATIENT
Start: 2019-04-26

## 2019-04-26 NOTE — PROGRESS NOTES
Psychiatric Outpatient Progress Note    Account Number:  [de-identified]  Name: Valentino Poag    SUBJECTIVE:   CHIEF COMPLAINT:  César Randle is a 57 y.o. , AA female and was seen today for her 4 week follow-up of psychiatric condition and psychotropic medication management.      HPI:    Fernando reports the following psychiatric symptoms:  anxiety and insomnia.  The symptoms have been present for years and are of moderate severity. The symptoms occur daily.     Patient was seen alone. She was cooperative and pleasant. 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 She will leave in one month. Transition has been rough. She does not sleep well and has nightmares. Denies any physical sx. Denies any psychosis or yecenia.     She has stable weight.  lbs. BMI = 24. BP/HR is WNL.  Reports compliance with medications.        Contributing factors include: transition     Patient denies SI/HI/SIB.      Side Effects:  headache       Fam/Soc Hx (from [facility] Eval with updates):    Patient has been  for 6 years. Hosey Carrel has 1 son who is 10years old. Geneva Bourne has master's in healthcare administration. 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 Geneva Bourne denies any legal problems. Geneva Bourne denies any childhood abuse.  Patient mother has anxiety/depression. Geneva Bourne reported that her relationship with her  is very good. Jordon Gorman son is in a good health     REVIEW OF SYSTEMS:  Constitutional: positive for fatigue  Eyes: positive for contacts/glasses  Ears, nose, mouth, throat, and face: negative for hearing loss and tinnitus  Respiratory: negative for cough or wheezing  Cardiovascular: negative for chest pain, palpitations  Gastrointestinal: negative for reflux symptoms and constipation  Genitourinary:negative for frequency and urinary incontinence  Musculoskeletal:negative for myalgias and arthralgias  Neurological: negative for headaches, seizures and memory problems  Behavioral/Psych: positive for anxiety and sleep disturbance, negative for SI or HI     SCALES: (10/24/18)    (3/22/19)  PHQ-9 : 4 - WNL       9/15 - worse  HAM-A: 7 - WNL        5      - improved      Visit Vitals  /86   Pulse 73   Ht 5' 6\" (1.676 m)   Wt 66.7 kg (147 lb)   BMI 23.73 kg/m²       OBJECTIVE:                 Mental Status exam: WNL except for      Sensorium  oriented to time, place and person   Relations cooperative    Eye Contact    appropriate   Appearance:  age appropriate and casually dressed   Motor Behavior/Gait:  within normal limits   Speech:  normal pitch and normal volume   Thought Process: goal directed and logical   Thought Content free of delusions and free of hallucinations   Suicidal ideations none   Homicidal ideations none   Mood:  euthymic   Affect:  anxious   Memory recent  adequate   Memory remote:  adequate   Concentration:  adequate   Abstraction:  concrete   Insight:  fair   Reliability fair   Judgment:  fair       MEDICAL DECISION MAKING  Data: pertinent labs, imaging, medical records and diagnostic tests reviewed and incorporated in diagnosis and treatment plan    No Known Allergies     Current Outpatient Medications   Medication Sig Dispense Refill    ALPRAZolam (XANAX) 2 mg tablet Take 1 Tab by mouth daily as needed for Anxiety. 30 Tab 3    PARoxetine (PAXIL) 10 mg tablet TAKE 1 AND A HALF TABLET BY MOUTH EVERY DAY 45 Tab 0    traZODone (DESYREL) 100 mg tablet TAKE 1 TABLET BY MOUTH EVERY DAY AT NIGHT 30 Tab 1    latanoprost (XALATAN) 0.005 % ophthalmic solution       albuterol (PROVENTIL HFA, VENTOLIN HFA, PROAIR HFA) 90 mcg/actuation inhaler Take 1-2 Puffs by inhalation every four (4) hours as needed. 1 Inhaler 0    ketoconazole (NIZORAL) 2 % shampoo       gabapentin (NEURONTIN) 300 mg capsule Take 2 Caps by mouth three (3) times daily.  540 Cap 3    glatiramer (COPAXONE) 20 mg/mL injection 20 mg by SubCUTAneous route daily. BRAND MEDICALLY NECESSARY 30 Syringe 11          Problems addressed today:    ICD-10-CM ICD-9-CM    1. DAE (generalized anxiety disorder) F41.1 300.02 ALPRAZolam (XANAX) 2 mg tablet   2. Social phobia involving fear of public speaking L18.99 312.08        Assessment:   Efrem Main  is a 57 y.o.  female  is responding to treatment. Symptoms are stable. Patient denies SI/HI/SIB. No evidence of AH/VH or delusions. Risk Scoring- chronic illnesses and prescription drug management    Treatment Plan:  1. Medications:          Medication Changes/Adjustments: Continue combination of Paxil, trazodone and PRN Xanax in the current dosages. Current Outpatient Medications   Medication Sig Dispense Refill    ALPRAZolam (XANAX) 2 mg tablet Take 1 Tab by mouth daily as needed for Anxiety. 30 Tab 3    PARoxetine (PAXIL) 10 mg tablet TAKE 1 AND A HALF TABLET BY MOUTH EVERY DAY 45 Tab 0    traZODone (DESYREL) 100 mg tablet TAKE 1 TABLET BY MOUTH EVERY DAY AT NIGHT 30 Tab 1    latanoprost (XALATAN) 0.005 % ophthalmic solution       albuterol (PROVENTIL HFA, VENTOLIN HFA, PROAIR HFA) 90 mcg/actuation inhaler Take 1-2 Puffs by inhalation every four (4) hours as needed. 1 Inhaler 0    ketoconazole (NIZORAL) 2 % shampoo       gabapentin (NEURONTIN) 300 mg capsule Take 2 Caps by mouth three (3) times daily. 540 Cap 3    glatiramer (COPAXONE) 20 mg/mL injection 20 mg by SubCUTAneous route daily. BRAND MEDICALLY NECESSARY 30 Syringe 11                  The following regarding medications was addressed:    (The risks and benefits of the proposed medication; the potential medication side effects ie    dry mouth, weight gain, GI upset, headache; patient given opportunity to ask questions)       2. Counseling and coordination of care including instructions for treatment, risks/benefits, risk factor reduction and patient/family education.  She agrees with the plan. Patient instructed to call with any side effects, questions or issues. 3.  Pt was provided supportive counseling for her severe stress of changing job. Discussed relaxation strategies, including meditation. PSYCHOTHERAPY:  approx 20 minutes  Type:  Supportive/Solution Focused psychotherapy provided  Focus:     Current problems:   Transition   Occupational issues   Medical issues    Psychoeducation provided: psych medications. Treatment plan reviewed with patient-including diagnosis and medications    Amara Almonte is stable. Follow-up and Dispositions    · Return in about 1 month (around 5/24/2019).        Agatha Goyal MD  4/26/2019

## 2019-05-10 ENCOUNTER — TELEPHONE (OUTPATIENT)
Dept: NEUROLOGY | Age: 49
End: 2019-05-10

## 2019-05-10 DIAGNOSIS — G35 MULTIPLE SCLEROSIS (HCC): ICD-10-CM

## 2019-05-10 DIAGNOSIS — G35 MULTIPLE SCLEROSIS (HCC): Primary | ICD-10-CM

## 2019-05-10 DIAGNOSIS — G37.3 ACUTE TRANSVERSE MYELITIS IN DEMYELINATING DISEASE OF CENTRAL NERVOUS SYSTEM (HCC): ICD-10-CM

## 2019-05-10 RX ORDER — GABAPENTIN 300 MG/1
600 CAPSULE ORAL 3 TIMES DAILY
Qty: 540 CAP | Refills: 3 | Status: SHIPPED | OUTPATIENT
Start: 2019-05-10 | End: 2019-07-17 | Stop reason: SDUPTHER

## 2019-05-10 NOTE — TELEPHONE ENCOUNTER
Future Appointments   Date Time Provider Chana Casianoi   5/24/2019  8:30 AM Donald Zambrano MD [address] [facility]                         Last Appointment My Department:  12/8/17    Please advise of refill below. Requested Prescriptions     Pending Prescriptions Disp Refills    gabapentin (NEURONTIN) 300 mg capsule 540 Cap 3     Sig: Take 2 Caps by mouth three (3) times daily.

## 2019-05-13 RX ORDER — PAROXETINE 10 MG/1
TABLET, FILM COATED ORAL
Qty: 45 TAB | Refills: 0 | Status: SHIPPED | OUTPATIENT
Start: 2019-05-13 | End: 2019-06-14 | Stop reason: SDUPTHER

## 2019-05-17 ENCOUNTER — OFFICE VISIT (OUTPATIENT)
Dept: FAMILY MEDICINE CLINIC | Age: 49
End: 2019-05-17

## 2019-05-17 VITALS
TEMPERATURE: 98.3 F | HEIGHT: 66 IN | DIASTOLIC BLOOD PRESSURE: 74 MMHG | BODY MASS INDEX: 24.43 KG/M2 | OXYGEN SATURATION: 100 % | HEART RATE: 65 BPM | WEIGHT: 152 LBS | SYSTOLIC BLOOD PRESSURE: 108 MMHG | RESPIRATION RATE: 18 BRPM

## 2019-05-17 DIAGNOSIS — Z80.3 FAMILY HISTORY OF BREAST CANCER: Primary | ICD-10-CM

## 2019-05-17 RX ORDER — KETOCONAZOLE 20 MG/ML
SHAMPOO TOPICAL
Qty: 120 ML | Refills: 3 | Status: SHIPPED | OUTPATIENT
Start: 2019-05-17 | End: 2019-11-26 | Stop reason: SDUPTHER

## 2019-05-17 NOTE — PROGRESS NOTES
Chief Complaint   Patient presents with    Medication Refill     nizoral shampoo     1. Have you been to the ER, urgent care clinic since your last visit? Hospitalized since your last visit? No    2. Have you seen or consulted any other health care providers outside of the [address] [place] [address] since your last visit? Include any pap smears or colon screening.  No

## 2019-05-17 NOTE — PATIENT INSTRUCTIONS
If you have not received a phone call within 24 hours regarding scheduling your diagnostic imaging please call central scheduling at [phone].         Mammogram: About This Test  What is it? A mammogram is an X-ray of the breast that is used to screen for breast cancer. This test can find tumors that are too small for you or your doctor to feel. Cancer is most easily treated and cured when it is found at an early stage. Why is this test done? A mammogram is done to:  · Look for breast cancer in women who don't have symptoms. · Find breast cancer in women who have symptoms. Symptoms of breast cancer may include a lump or thickening in the breast, nipple discharge, or dimpling of the skin on one area of the breast.  · Find an area of suspicious breast tissue to remove for an exam under a microscope (biopsy). How can you prepare for the test?  · Tell your doctor if you:  ? Are or might be pregnant. ? Are breastfeeding. ? Have breast implants. ? Have previously had a breast biopsy. · On the day of the test, don't use any deodorant, perfume, powders, or ointments. What happens before the test?  · You will need to take off any jewelry that might interfere with the X-ray pictures. · You will need to take off your clothes above the waist.  · You will be given a cloth or paper gown to use during the test.  What happens during the test?  · You usually stand during a mammogram.  · One at a time, your breasts will be placed on a flat plate that contains the X-ray film. · Another plate is then pressed firmly against your breast to help flatten out the breast tissue. You may be asked to lift your arm. · For a few seconds while the X-ray picture is being taken, you will need to hold your breath. · At least two pictures are taken of each breast. One is taken from the top and one from the side. What else should you know about the test?  · The X-ray plate will feel cold when you place your breast on it.  Having your breasts flattened and squeezed isn't comfortable. But it is necessary to flatten out the breast tissue to get the best pictures. · Mammograms do not prevent breast cancer or reduce a woman's risk of developing cancer. · Most things that are found during a mammogram are not breast cancer. How long does the test take? · The test will take about 10 to 15 minutes. You may be in the clinic for up to an hour. What happens after the test?  · You will probably be able to go home right away. · You can go back to your usual activities right away. Follow-up care is a key part of your treatment and safety. Be sure to make and go to all appointments, and call your doctor if you are having problems. It's also a good idea to keep a list of the medicines you take. Ask your doctor when you can expect to have your test results. Where can you learn more? Go to http://marv-uvaldo.info/. Enter O938 in the search box to learn more about \"Mammogram: About This Test.\"  Current as of: March 27, 2018  Content Version: 11.9  © 6947-5988 [employer]. Care instructions adapted under license by [employer] (which disclaims liability or warranty for this information).  If you have questions about a medical condition or this instruction, always ask your healthcare professional. [address] any warranty or liability for your use of this information.

## 2019-05-17 NOTE — PROGRESS NOTES
HPI     Chief Complaint   Patient presents with    Medication Refill     nizoral shampoo     She is a 57 y.o. female who presents for med refill. Dandruff  - Has had dandruff for years  - States she uses Ketoconazole 2% shampoo on an as needed basis when she has her hair done  - Does not use any products  - Previously failed OTC options    Family Hx of Breast Cancer  - Mom had a lumpectomy when younger, unsure of diagnosis  - Half sister had breast cancer at 52  - Patient desires mammogram     Review of Systems   Skin: Negative for color change and rash.        + dandruff   Hematological: Negative for adenopathy. No abnormal breast masses/ nipple discharge      Reviewed PmHx, RxHx, FmHx, SocHx, AllgHx and updated and dated in the chart. Physical Exam:  Visit Vitals  /74   Pulse 65   Temp 98.3 °F (36.8 °C) (Oral)   Resp 18   Ht 5' 6\" (1.676 m)   Wt 152 lb (68.9 kg)   LMP 05/02/2019   SpO2 100%   BMI 24.53 kg/m²     Physical Exam   Constitutional: She appears well-developed and well-nourished. No distress. Cardiovascular: Normal rate, regular rhythm and normal heart sounds. Exam reveals no gallop and no friction rub. No murmur heard. Pulmonary/Chest: Effort normal and breath sounds normal. No stridor. No respiratory distress. She has no wheezes. She has no rales. Skin: Skin is warm and dry. She is not diaphoretic. Psychiatric: She has a normal mood and affect. Her behavior is normal.   Nursing note and vitals reviewed. No results found for this or any previous visit (from the past 12 hour(s)). Assessment / Plan     Dandruff  - Refill Ketoconazole 2% shampoo, use PRN, 3 refills  - Avoid products that buildup/ dry scalp    Family Hx of Breast Cancer  - Discussed risks vs benefits of screening at this age and patient desires screening    Follow up PRN    I have discussed the diagnosis with the patient and the intended plan as seen in the above orders.  The patient has received [facility] Progress Note    Author: Mile Spears APRN  Supervising Physician: Bill Martinez MD     Date of Service  4/13/2019    History of Present Illness  57 y.o. female admitted 4/12/2019 with end-stage renal disease, on chronic hemodialysis   Monday, Wednesday, and Fridays at [facility].  The patient's last hemodialysis   was on 04/09, was a complete treatment and she missed dialysis on 04/10.  She presented   to the emergency room this morning on 04/12 complaining of severe dyspnea, cough with   productive of sputum.  Evaluation in the emergency room revealed hyperkalemia   with a potassium initially of 6.8.  We have been asked to assist in her   management.    Daily Nephrology Summary  4/12--Consult. HD.  4/13--HD again today. Feels better.     Review of Systems  Review of Systems   Constitutional: Negative for chills, fever and malaise/fatigue.   HENT: Negative.    Eyes: Negative.    Respiratory: Positive for cough. Negative for sputum production and shortness of breath.    Cardiovascular: Negative.    Gastrointestinal: Negative.    Genitourinary: Negative.    Musculoskeletal: Positive for myalgias. Negative for back pain and neck pain.   Skin: Negative.    Neurological: Negative.    Endo/Heme/Allergies: Negative.         Physical Exam  Temp:  [36.6 °C (97.9 °F)-37.2 °C (99 °F)] 36.9 °C (98.5 °F)  Pulse:  [76-92] 80  Resp:  [16-20] 16  BP: (137-164)/(75-90) 137/75  SpO2:  [95 %-99 %] 95 %    Physical Exam   Constitutional: She is oriented to person, place, and time. She appears well-developed and well-nourished.   HENT:   Head: Normocephalic and atraumatic.   Eyes: Pupils are equal, round, and reactive to light. Conjunctivae and EOM are normal.   Neck: Normal range of motion.   Cardiovascular: Normal rate, regular rhythm and normal heart sounds.    Pulmonary/Chest: Effort normal. No respiratory distress. She has wheezes. She has rales.   Abdominal: Soft. Bowel sounds are  an after-visit summary and questions were answered concerning future plans. I have discussed medication side effects and warnings with the patient as well.     Patient discussed with Dr. Josephine Llamas (Attending)    Bandar Meredith DO  Family Medicine Resident normal.   Musculoskeletal: Normal range of motion. She exhibits no edema.   S/P R BKA   Neurological: She is alert and oriented to person, place, and time.   Skin: Skin is warm and dry.       Fluids    Intake/Output Summary (Last 24 hours) at 04/13/19 1112  Last data filed at 04/13/19 0544   Gross per 24 hour   Intake             1200 ml   Output             4382 ml   Net            -3182 ml       Laboratory  Recent Labs      04/12/19   0330  04/13/19   0213   WBC  4.6*  5.1   RBC  2.94*  2.89*   HEMOGLOBIN  9.6*  9.2*   HEMATOCRIT  29.6*  29.2*   MCV  100.7*  101.0*   MCH  32.7  31.8   MCHC  32.4*  31.5*   RDW  57.7*  56.4*   PLATELETCT  112*  117*   MPV  9.9  10.5     Recent Labs      04/12/19   0800  04/12/19   1401  04/13/19   0213   SODIUM  137  151*  138   POTASSIUM  6.3*  4.6  5.2   CHLORIDE  97  105  97   CO2  22  29  28   GLUCOSE  98  138*  110*   BUN  79*  30*  49*   CREATININE  10.00*  5.06*  6.97*   CALCIUM  8.7  8.8  8.3*         Recent Labs      04/12/19   0330   BNPBTYPENAT  4936*           Imaging      Assessment/Plan  IMPRESSION:    1.  End-stage renal disease, on chronic hemodialysis Monday, Wednesday, and   Friday.  Patient missed dialysis Wed.  2.  Hyperkalemia, secondary to noncompliance.  3.  Acute respiratory failure.  Chest x-ray revealed no infiltrates.  She   possible has bronchitis.  She may have had chronic obstructive pulmonary   disease exacerbation, although she does not carry a diagnosis of chronic   obstructive pulmonary disease.  She has a longstanding history of tobacco use.    There is no evidence of congestive heart failure on chest x-ray, no obvious   fluid overload, but she may have some subclinical fluid overload.    4.  Anemia of chronic kidney disease, target.  5.  Hypertension, not controlled acutely but improving.  6.  Mild thrombocytopenia.  7.  History of supraventricular tachycardia, status post ablation.  8.  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  She is  not on   phosphorus binders apparently.  9.  Dyslipidemia, not on statin.  10.  SULFA ALLERGY.  11.  Left upper extremity arteriovenous fistula.  12.  Status post right below-the-knee amputation.  13.  Ongoing tobacco use.  14.  Past history of alcohol and drug use.  15.  History of several surgeries.     PLAN:  1. HD q Mon,Wed,Fri and PRN  2. Epogen q MWF with dialysis.  3. IV iron load   4. Continue her home medications.   5. Dose adjust all medications for GFR less than 10.  6. No dietary protein restrictions.  7. Renal diet-low potassium, low phosphorus, low sodium, high protein   8. Encourage her to take phosphorus binders with meals.      No new Assessment & Plan notes have been filed under this hospital service since the last note was generated.  Service: Nephrology

## 2019-06-07 RX ORDER — TRAZODONE HYDROCHLORIDE 100 MG/1
TABLET ORAL
Qty: 30 TAB | Refills: 1 | Status: SHIPPED | OUTPATIENT
Start: 2019-06-07

## 2019-06-16 RX ORDER — PAROXETINE 10 MG/1
TABLET, FILM COATED ORAL
Qty: 45 TAB | Refills: 0 | Status: SHIPPED | OUTPATIENT
Start: 2019-06-16 | End: 2019-12-12 | Stop reason: ALTCHOICE

## 2019-07-17 DIAGNOSIS — G35 MULTIPLE SCLEROSIS (HCC): ICD-10-CM

## 2019-07-17 DIAGNOSIS — G37.3 ACUTE TRANSVERSE MYELITIS IN DEMYELINATING DISEASE OF CENTRAL NERVOUS SYSTEM (HCC): ICD-10-CM

## 2019-07-17 RX ORDER — GABAPENTIN 300 MG/1
600 CAPSULE ORAL 3 TIMES DAILY
Qty: 540 CAP | Refills: 3 | Status: SHIPPED | OUTPATIENT
Start: 2019-07-17 | End: 2019-12-12 | Stop reason: ALTCHOICE

## 2019-07-17 NOTE — TELEPHONE ENCOUNTER
No future appointments. Last Appointment My Department:  12/8/2017    Please advise of refill below. Request for 90 day fill  Requested Prescriptions     Pending Prescriptions Disp Refills    gabapentin (NEURONTIN) 300 mg capsule 540 Cap 3     Sig: Take 2 Caps by mouth three (3) times daily.

## 2019-09-03 RX ORDER — TRAZODONE HYDROCHLORIDE 100 MG/1
TABLET ORAL
Qty: 30 TAB | Refills: 1 | OUTPATIENT
Start: 2019-09-03

## 2019-09-23 ENCOUNTER — OFFICE VISIT (OUTPATIENT)
Dept: OBGYN CLINIC | Age: 49
End: 2019-09-23

## 2019-09-23 VITALS
WEIGHT: 163 LBS | DIASTOLIC BLOOD PRESSURE: 94 MMHG | SYSTOLIC BLOOD PRESSURE: 120 MMHG | HEIGHT: 66 IN | BODY MASS INDEX: 26.2 KG/M2

## 2019-09-23 DIAGNOSIS — N92.1 MENOMETRORRHAGIA: ICD-10-CM

## 2019-09-23 DIAGNOSIS — Z01.419 WELL WOMAN EXAM WITH ROUTINE GYNECOLOGICAL EXAM: Primary | ICD-10-CM

## 2019-09-23 LAB
HCG URINE, QL. (POC): NEGATIVE
VALID INTERNAL CONTROL?: YES

## 2019-09-23 RX ORDER — DESOGESTREL AND ETHINYL ESTRADIOL 21-5 (28)
1 KIT ORAL DAILY
Qty: 1 PACKAGE | Refills: 12 | Status: SHIPPED | OUTPATIENT
Start: 2019-09-23 | End: 2019-12-12 | Stop reason: ALTCHOICE

## 2019-09-23 RX ORDER — PROGESTERONE 200 MG/1
200 CAPSULE ORAL DAILY
Qty: 10 CAP | Refills: 0 | Status: SHIPPED | OUTPATIENT
Start: 2019-09-23 | End: 2019-12-12 | Stop reason: ALTCHOICE

## 2019-09-23 NOTE — PATIENT INSTRUCTIONS
Well Visit, Ages 25 to 48: Care Instructions  Your Care Instructions    Physical exams can help you stay healthy. Your doctor has checked your overall health and may have suggested ways to take good care of yourself. He or she also may have recommended tests. At home, you can help prevent illness with healthy eating, regular exercise, and other steps. Follow-up care is a key part of your treatment and safety. Be sure to make and go to all appointments, and call your doctor if you are having problems. It's also a good idea to know your test results and keep a list of the medicines you take. How can you care for yourself at home? · Reach and stay at a healthy weight. This will lower your risk for many problems, such as obesity, diabetes, heart disease, and high blood pressure. · Get at least 30 minutes of physical activity on most days of the week. Walking is a good choice. You also may want to do other activities, such as running, swimming, cycling, or playing tennis or team sports. Discuss any changes in your exercise program with your doctor. · Do not smoke or allow others to smoke around you. If you need help quitting, talk to your doctor about stop-smoking programs and medicines. These can increase your chances of quitting for good. · Talk to your doctor about whether you have any risk factors for sexually transmitted infections (STIs). Having one sex partner (who does not have STIs and does not have sex with anyone else) is a good way to avoid these infections. · Use birth control if you do not want to have children at this time. Talk with your doctor about the choices available and what might be best for you. · Protect your skin from too much sun. When you're outdoors from 10 a.m. to 4 p.m., stay in the shade or cover up with clothing and a hat with a wide brim. Wear sunglasses that block UV rays. Even when it's cloudy, put broad-spectrum sunscreen (SPF 30 or higher) on any exposed skin.   · See a dentist one or two times a year for checkups and to have your teeth cleaned. · Wear a seat belt in the car. Follow your doctor's advice about when to have certain tests. These tests can spot problems early. For everyone  · Cholesterol. Have the fat (cholesterol) in your blood tested after age 21. Your doctor will tell you how often to have this done based on your age, family history, or other things that can increase your risk for heart disease. · Blood pressure. Have your blood pressure checked during a routine doctor visit. Your doctor will tell you how often to check your blood pressure based on your age, your blood pressure results, and other factors. · Vision. Talk with your doctor about how often to have a glaucoma test.  · Diabetes. Ask your doctor whether you should have tests for diabetes. · Colon cancer. Your risk for colorectal cancer gets higher as you get older. Some experts say that adults should start regular screening at age 48 and stop at age 76. Others say to start before age 48 or continue after age 76. Talk with your doctor about your risk and when to start and stop screening. For women  · Breast exam and mammogram. Talk to your doctor about when you should have a clinical breast exam and a mammogram. Medical experts differ on whether and how often women under 50 should have these tests. Your doctor can help you decide what is right for you. · Cervical cancer screening test and pelvic exam. Begin with a Pap test at age 24. The test often is part of a pelvic exam. Starting at age 27, you may choose to have a Pap test, an HPV test, or both tests at the same time (called co-testing). Talk with your doctor about how often to have testing. · Tests for sexually transmitted infections (STIs). Ask whether you should have tests for STIs. You may be at risk if you have sex with more than one person, especially if your partners do not wear condoms.   For men  · Tests for sexually transmitted infections (STIs). Ask whether you should have tests for STIs. You may be at risk if you have sex with more than one person, especially if you do not wear a condom. · Testicular cancer exam. Ask your doctor whether you should check your testicles regularly. · Prostate exam. Talk to your doctor about whether you should have a blood test (called a PSA test) for prostate cancer. Experts differ on whether and when men should have this test. Some experts suggest it if you are older than 39 and are -American or have a father or brother who got prostate cancer when he was younger than 72. When should you call for help? Watch closely for changes in your health, and be sure to contact your doctor if you have any problems or symptoms that concern you. Where can you learn more? Go to http://marv-uvaldo.info/. Enter P072 in the search box to learn more about \"Well Visit, Ages 25 to 48: Care Instructions. \"  Current as of: December 13, 2018  Content Version: 12.2  © 0091-3310 [employer]. Care instructions adapted under license by [employer] (which disclaims liability or warranty for this information). If you have questions about a medical condition or this instruction, always ask your healthcare professional. [place] [address] any warranty or liability for your use of this information.

## 2019-09-23 NOTE — PROGRESS NOTES
Annual exam ages 40-58    [address] is a No obstetric history on file. ,  54 y.o. female [address] Patient's last menstrual period was 09/02/2019 (exact date). .    She presents for her annual checkup. She is having intermenstrual spotting for past 3 weeks. .  Typically has regular menses; heavy; soaks thru double protection; limits activity  Did longterm depoprovera for years and then years of ocp     Menstrual status:    Her periods are heavy in flow. She is using three to five pads or tampons per day, usually regular and last 26-30 days. She denies dysmenorrhea. She reports no premenstrual symptoms. Contraception:    The current method of family planning is none. Sexual history:    She  reports that she currently engages in sexual activity and has had partner(s) who are Male. She reports using the following method of birth control/protection: None. Medical conditions:    Since her last annual GYN exam about 4-5 ago, she has not the following changes in her health history: none. Pap and Mammogram History:    Her most recent Pap smear was normal, obtained 4-5 year(s) ago. The patient has not had a recent mammogram.    Breast Cancer History/Substance Abuse: negative    Osteoporosis History:    Family history does not include a first or second degree relative with osteopenia or osteoporosis. Past Medical History:   Diagnosis Date    Asthma     MS (multiple sclerosis) ([facility] [address]      Past Surgical History:   Procedure Laterality Date    HX APPENDECTOMY      HX CHOLECYSTECTOMY      HX GYN      c/sec       Current Outpatient Medications   Medication Sig Dispense Refill    gabapentin (NEURONTIN) 300 mg capsule Take 2 Caps by mouth three (3) times daily.  540 Cap 3    PARoxetine (PAXIL) 10 mg tablet TAKE 1 AND A HALF TABLET BY MOUTH EVERY DAY 45 Tab 0    traZODone (DESYREL) 100 mg tablet TAKE 1 TABLET BY MOUTH EVERY DAY AT NIGHT 30 Tab 1    ketoconazole (NIZORAL) 2 % shampoo Apply a dime size amount as needed to scalp. 120 mL 3    ALPRAZolam (XANAX) 2 mg tablet Take 1 Tab by mouth daily as needed for Anxiety. 30 Tab 3    latanoprost (XALATAN) 0.005 % ophthalmic solution       albuterol (PROVENTIL HFA, VENTOLIN HFA, PROAIR HFA) 90 mcg/actuation inhaler Take 1-2 Puffs by inhalation every four (4) hours as needed. 1 Inhaler 0    glatiramer (COPAXONE) 20 mg/mL injection 20 mg by SubCUTAneous route daily. BRAND MEDICALLY NECESSARY 30 Syringe 11     Allergies: Patient has no known allergies. Tobacco History:  reports that she has never smoked. She has never used smokeless tobacco.  Alcohol Abuse:  reports that she drinks about 2.0 standard drinks of alcohol per week. Drug Abuse:  reports that she does not use drugs.     Family Medical/Cancer History:   Family History   Problem Relation Age of Onset    Hypertension Mother     Other Mother         Hemoglobin C disorder    Asthma Father     Other Maternal Aunt         Hemoglobin C disorder    Cancer Paternal Uncle         pancreatic    Heart Disease Paternal Uncle     Cancer Paternal Grandmother     Heart Disease Paternal Grandfather     Asthma Child         Review of Systems - History obtained from the patient  Constitutional: negative for weight loss, fever, night sweats  HEENT: negative for hearing loss, earache, congestion, snoring, sorethroat  CV: negative for chest pain, palpitations, edema  Resp: negative for cough, shortness of breath, wheezing  GI: negative for change in bowel habits, abdominal pain, black or bloody stools  : negative for frequency, dysuria, hematuria, vaginal discharge  MSK: negative for back pain, joint pain, muscle pain  Breast: negative for breast lumps, nipple discharge, galactorrhea  Skin :negative for itching, rash, hives  Neuro: negative for dizziness, headache, confusion, weakness  Psych: negative for anxiety, depression, change in mood  Heme/lymph: negative for bleeding, bruising, pallor    Physical Exam    Visit Vitals  Ht 5' 6\" (1.676 m)   Wt 163 lb (73.9 kg)   LMP 09/02/2019 (Exact Date)   BMI 26.31 kg/m²       Constitutional  · Appearance: well-nourished, well developed, alert, in no acute distress    HENT  · Head and Face: appears normal    Chest  · Respiratory Effort: breathing unlabored      Breasts  · Inspection of Breasts: breasts symmetrical, no skin changes, no discharge present, nipple appearance normal, no skin retraction present  · Palpation of Breasts and Axillae: no masses present on palpation, no breast tenderness  · Axillary Lymph Nodes: no lymphadenopathy present    Gastrointestinal  · Abdominal Examination: abdomen non-tender to palpation, normal bowel sounds, no masses present  · Liver and spleen: no hepatomegaly present, spleen not palpable  · Hernias: no hernias identified    Skin  · General Inspection: no rash, no lesions identified    Neurologic/Psychiatric  · Mental Status:  · Orientation: grossly oriented to person, place and time  · Mood and Affect: mood normal, affect appropriate    Genitourinary  · External Genitalia: normal appearance for age, no discharge present, no tenderness present, no inflammatory lesions present, no masses present, no atrophy present  · Vagina: normal vaginal vault without central or paravaginal defects, no discharge present, no inflammatory lesions present, no masses present  · Bladder: non-tender to palpation  · Urethra: appears normal  · Cervix: normal   · Uterus: normal size, shape and consistency  · Adnexa: no adnexal tenderness present, no adnexal masses present  · Perineum: perineum within normal limits, no evidence of trauma, no rashes or skin lesions present  · Anus: anus within normal limits, no hemorrhoids present  · Inguinal Lymph Nodes: no lymphadenopathy present  Procedure note: Endometrial biopsy    T[place] Escudero is a No obstetric history on file. ,  54 y.o. female [address] Patient's last menstrual period was 09/02/2019 (exact date). The patient has a history of The encounter diagnosis was Well woman exam with routine gynecological exam. and presents for an endometrial biopsy. Indications:   After the indications, risks, benefits, and alternatives to performing an endometrial biopsy were explained to the patient, her questions were answered and informed consent was obtained. Procedure: The patient was placed on the table in the dorsal lithotomy position. A bimanual exam showed the uterus to be anterior. The uterus was not enlarged. A speculum was placed in the vagina. The cervix was visualized and prepped with betadine. An Allis tenaculum was   placed on the anterior lip of the cervix for traction. It was not necessary to dilate the cervix. A pipelle was passed through the endocervical canal without difficulty. The uterus was sounded to 8 cm's. A moderate amount of tissue was returned. This tissue was placed in formalin and sent to pathology. It was felt that an adequate sample was obtained. The patient tolerated the procedure well and she reported mild cramping. The tenaculum and speculum were removed. Post Procedural Status: The patient was observed for 2 minutes after the procedure. She had mild cramping at the time of discharge. There were no complications. The patient was discharged in stable condition.    [facility] AT [facility]  OFFICE PROCEDURE PROGRESS NOTE        Chart reviewed for the following:   Daija Ruiz MD, have reviewed the History, Physical and updated the Allergic reactions for [address] performed immediately prior to start of procedure:   Daija Ruiz MD, have performed the following reviews on Winsome Villeda prior to the start of the procedure:            * Patient was identified by name and date of birth   * Agreement on procedure being performed was verified  * Risks and Benefits explained to the patient  * Procedure site verified and marked as necessary  * Patient was positioned for comfort  * Consent was signed and verified     Time: 3pm      Date of procedure: 9/23/2019    Procedure performed by:  Tresa Carpio MD    Provider assisted by: Elda Kincaid RN    Patient assisted by: self    How tolerated by patient: tolerated the procedure well with no complications    Post Procedural Pain Scale: 0 - No Hurt    Comments: none          Assessment:  Routine gynecologic examination  menometrorrhagia  Her current medical status is satisfactory with no evidence of significant gynecologic issues.     Plan:  UPT office negative  pipelle now  10 days of 200mg of prometrium and then keep menstrual calendar  Wants to restart ocp  Pap  today  Counseled re: diet, exercise, healthy lifestyle  Return for yearly wellness visits  Rec annual mammogram

## 2019-09-25 LAB
DX ICD CODE: NORMAL
DX ICD CODE: NORMAL
PATH REPORT.FINAL DX SPEC: NORMAL
PATH REPORT.GROSS SPEC: NORMAL
PATH REPORT.SITE OF ORIGIN SPEC: NORMAL
PATHOLOGIST NAME: NORMAL
PAYMENT PROCEDURE: NORMAL

## 2019-09-26 LAB
CYTOLOGIST CVX/VAG CYTO: NORMAL
CYTOLOGY CVX/VAG DOC CYTO: NORMAL
CYTOLOGY CVX/VAG DOC THIN PREP: NORMAL
DX ICD CODE: NORMAL
LABCORP, 190119: NORMAL
Lab: NORMAL
OTHER STN SPEC: NORMAL
STAT OF ADQ CVX/VAG CYTO-IMP: NORMAL

## 2019-11-06 ENCOUNTER — TELEPHONE (OUTPATIENT)
Dept: NEUROLOGY | Age: 49
End: 2019-11-06

## 2019-11-06 NOTE — TELEPHONE ENCOUNTER
Copaxone 20mg, 30 for 30 done through cover my meds    Approved: Today 11/6/19  Questionnaire submitted. PA Case [id number] Status: Approved. Authorization and Notifications Completed.     Patient notified  Notes scanned in to chart

## 2019-11-26 ENCOUNTER — TELEPHONE (OUTPATIENT)
Dept: NEUROLOGY | Age: 49
End: 2019-11-26

## 2019-11-26 ENCOUNTER — OFFICE VISIT (OUTPATIENT)
Dept: NEUROLOGY | Age: 49
End: 2019-11-26

## 2019-11-26 VITALS
SYSTOLIC BLOOD PRESSURE: 106 MMHG | HEART RATE: 76 BPM | OXYGEN SATURATION: 98 % | DIASTOLIC BLOOD PRESSURE: 66 MMHG | RESPIRATION RATE: 12 BRPM | HEIGHT: 66 IN | BODY MASS INDEX: 24.91 KG/M2 | WEIGHT: 155 LBS

## 2019-11-26 DIAGNOSIS — G37.3 ACUTE TRANSVERSE MYELITIS IN DEMYELINATING DISEASE OF CENTRAL NERVOUS SYSTEM (HCC): ICD-10-CM

## 2019-11-26 DIAGNOSIS — G35 MULTIPLE SCLEROSIS (HCC): Primary | ICD-10-CM

## 2019-11-26 DIAGNOSIS — E53.8 B12 DEFICIENCY: ICD-10-CM

## 2019-11-26 DIAGNOSIS — E55.9 VITAMIN D DEFICIENCY: ICD-10-CM

## 2019-11-26 DIAGNOSIS — G35 MULTIPLE SCLEROSIS (HCC): ICD-10-CM

## 2019-11-26 RX ORDER — GLATIRAMER ACETATE 20 MG/ML
20 INJECTION, SOLUTION SUBCUTANEOUS DAILY
Qty: 30 SYRINGE | Refills: 11 | Status: SHIPPED | OUTPATIENT
Start: 2019-11-26 | End: 2019-12-02 | Stop reason: SDUPTHER

## 2019-11-26 RX ORDER — METHYLPREDNISOLONE 4 MG/1
TABLET ORAL
Qty: 1 DOSE PACK | Refills: 1 | Status: SHIPPED | OUTPATIENT
Start: 2019-11-26 | End: 2019-12-12 | Stop reason: ALTCHOICE

## 2019-11-26 NOTE — PATIENT INSTRUCTIONS
A Healthy Lifestyle: Care Instructions  Your Care Instructions    A healthy lifestyle can help you feel good, stay at a healthy weight, and have plenty of energy for both work and play. A healthy lifestyle is something you can share with your whole family. A healthy lifestyle also can lower your risk for serious health problems, such as high blood pressure, heart disease, and diabetes. You can follow a few steps listed below to improve your health and the health of your family. Follow-up care is a key part of your treatment and safety. Be sure to make and go to all appointments, and call your doctor if you are having problems. It's also a good idea to know your test results and keep a list of the medicines you take. How can you care for yourself at home? · Do not eat too much sugar, fat, or fast foods. You can still have dessert and treats now and then. The goal is moderation. · Start small to improve your eating habits. Pay attention to portion sizes, drink less juice and soda pop, and eat more fruits and vegetables. ? Eat a healthy amount of food. A 3-ounce serving of meat, for example, is about the size of a deck of cards. Fill the rest of your plate with vegetables and whole grains. ? Limit the amount of soda and sports drinks you have every day. Drink more water when you are thirsty. ? Eat at least 5 servings of fruits and vegetables every day. It may seem like a lot, but it is not hard to reach this goal. A serving or helping is 1 piece of fruit, 1 cup of vegetables, or 2 cups of leafy, raw vegetables. Have an apple or some carrot sticks as an afternoon snack instead of a candy bar. Try to have fruits and/or vegetables at every meal.  · Make exercise part of your daily routine. You may want to start with simple activities, such as walking, bicycling, or slow swimming. Try to be active 30 to 60 minutes every day. You do not need to do all 30 to 60 minutes all at once.  For example, you can exercise 3 times a day for 10 or 20 minutes. Moderate exercise is safe for most people, but it is always a good idea to talk to your doctor before starting an exercise program.  · Keep moving. Tracey Powell the lawn, work in the garden, or "[employer]. Take the stairs instead of the elevator at work. · If you smoke, quit. People who smoke have an increased risk for heart attack, stroke, cancer, and other lung illnesses. Quitting is hard, but there are ways to boost your chance of quitting tobacco for good. ? Use nicotine gum, patches, or lozenges. ? Ask your doctor about stop-smoking programs and medicines. ? Keep trying. In addition to reducing your risk of diseases in the future, you will notice some benefits soon after you stop using tobacco. If you have shortness of breath or asthma symptoms, they will likely get better within a few weeks after you quit. · Limit how much alcohol you drink. Moderate amounts of alcohol (up to 2 drinks a day for men, 1 drink a day for women) are okay. But drinking too much can lead to liver problems, high blood pressure, and other health problems. Family health  If you have a family, there are many things you can do together to improve your health. · Eat meals together as a family as often as possible. · Eat healthy foods. This includes fruits, vegetables, lean meats and dairy, and whole grains. · Include your family in your fitness plan. Most people think of activities such as jogging or tennis as the way to fitness, but there are many ways you and your family can be more active. Anything that makes you breathe hard and gets your heart pumping is exercise. Here are some tips:  ? Walk to do errands or to take your child to school or the bus.  ? Go for a family bike ride after dinner instead of watching TV. Where can you learn more? Go to http://marv-uvaldo.info/. Enter M262 in the search box to learn more about \"A Healthy Lifestyle: Care Instructions. \"  Current as of: May 28, 2019  Content Version: 12.2  © 8760-9669 [employer]. Care instructions adapted under license by [employer] (which disclaims liability or warranty for this information). If you have questions about a medical condition or this instruction, always ask your healthcare professional. [address] any warranty or liability for your use of this information. Office Policies    o Phone calls/patient messages:  Please allow up to 24 hours for someone in the office to contact you about your call or message. Be mindful your provider may be out of the office or your message may require further review. We encourage you to use [employer] for your messages as this is a faster, more efficient way to communicate with our office    o Medication Refills:  Prescription medications require up to 48 business hours to process. We encourage you to use [employer] for your refills. For controlled medications: Please allow up to 72 business hours to process. Certain medications may require you to  a written prescription at our office. NO narcotic/controlled medications will be prescribed after 4pm Monday through Friday or on weekends    o Form/Paperwork Completion:  We ask that you allow 7-14 business days. You may also download your forms to [employer] to have your doctor print off.

## 2019-11-26 NOTE — TELEPHONE ENCOUNTER
Patient had a problem with right ankle feeling like it was giving out and then a burning sensation starting up the left foot and going up the calf. Patient had to sit down.   [facility] per verbal order with read back from Dr. Amanda Chino

## 2019-11-26 NOTE — LETTER
11/26/19 Patient: Pino Bello YOB: 1970 Date of Visit: 11/26/2019 Gilberto Rivas MD 
[place] [address] VIA In Basket Dear Gilberto Rivas MD, Thank you for referring Ms. Reynaldo Velasco to [address] [place] [address] for evaluation. My notes for this consultation are attached. Consult REFERRED BY: 
Carolina Oates MD 
 
CHIEF COMPLAINT: Numbness and tingling in her legs Subjective: Pino Bello is a 54 y.o. right-handed -American female, seen urgent work in basis at the request of Dr. Alicia Ahmadi for evaluation of new problem of sudden weakness in her right ankle and lower leg while she was working today, to the point that she could not walk and had to sit down for a while, and then quickly followed by a burning intense painful numbness in her left leg that is just gradually beginning to resolve. The spell lasted about half an hour and seems to be slowly getting better and had no involvement of her vision, cranial nerves, or her arms or bowel or bladder. She had an MRI scan done 2 years ago that showed stable disease of the neck and brain on Copaxone therapy. She had no fever, no rashes, no increased back pain, no other cause of her symptoms except her MS. Since she is getting better already we gave her a Medrol Dosepak, but we will reimage her brain and cervical spine to make sure she is stable on Copaxone therapy may need to switch her therapy to another disease modifying drug. The patient was diagnosed with multiple sclerosis in 2001, I believe at the [facility], and was treated with Copaxone after a workup including MRIs and complete neurological evaluation. Her MRI scan of the brain shows several white matter lesions typical of demyelinating disease multiple sclerosis, an MRI scan of the cervical spine shows no active lesions, just mild degenerative changes. She has no enhancing lesions which is all good for stability of her MS. Her sensory Lhermitte sign and symptoms are much better on Neurontin 300 mg 2-3 times a day. She is able to function well now, and was given steroids without much effect. Patient had a relapse in 2008 that was treated with IV steroids but her Copaxone was maintained. .  She has no symptoms in her arms at this time. She has had no cranial nerve symptoms, except that she has had some blurred vision in her right eye has been persistent for several weeks. .  She had no unusual viruses, infections, drug exposure, toxin, injury, or any other cause for her symptoms. She has had no other new medical problems, complications or illnesses. Past Medical History:  
Diagnosis Date  Asthma  MS (multiple sclerosis) ([facility] [address] Past Surgical History:  
Procedure Laterality Date  HX APPENDECTOMY  HX CHOLECYSTECTOMY  HX GYN    
 c/sec Family History Problem Relation Age of Onset  Hypertension Mother Phan Other Mother Hemoglobin C disorder  Asthma Father  Other Maternal Aunt Hemoglobin C disorder  Cancer Paternal Uncle   
     pancreatic  Heart Disease Paternal Uncle  Cancer Paternal Grandmother  Heart Disease Paternal Grandfather  Asthma Child Social History Tobacco Use  Smoking status: Never Smoker  Smokeless tobacco: Never Used Substance Use Topics  Alcohol use: Yes Alcohol/week: 2.0 standard drinks Types: 2 Glasses of wine per week Comment: 4-5 drinks per week Current Outpatient Medications:  
  glatiramer (COPAXONE) 20 mg/mL injection, 20 mg by SubCUTAneous route daily. BRAND MEDICALLY NECESSARY, Disp: 30 Syringe, Rfl: 11 
  methylPREDNISolone (MEDROL DOSEPACK) 4 mg tablet, USE AS DIRECTED, Disp: 1 Dose Pack, Rfl: 1   progesterone (PROMETRIUM) 200 mg capsule, Take 1 Cap by mouth daily. , Disp: 10 Cap, Rfl: 0 
   desog-e.estradiol/e.estradiol (KARIVA) 0.15-0.02 mgx21 /0.01 mg x 5 tab, Take 1 Tab by mouth daily. , Disp: 1 Package, Rfl: 12 
  gabapentin (NEURONTIN) 300 mg capsule, Take 2 Caps by mouth three (3) times daily. , Disp: 540 Cap, Rfl: 3 
  PARoxetine (PAXIL) 10 mg tablet, TAKE 1 AND A HALF TABLET BY MOUTH EVERY DAY, Disp: 45 Tab, Rfl: 0 
  traZODone (DESYREL) 100 mg tablet, TAKE 1 TABLET BY MOUTH EVERY DAY AT NIGHT, Disp: 30 Tab, Rfl: 1   ALPRAZolam (XANAX) 2 mg tablet, Take 1 Tab by mouth daily as needed for Anxiety. , Disp: 30 Tab, Rfl: 3 
  latanoprost (XALATAN) 0.005 % ophthalmic solution, , Disp: , Rfl:  
  albuterol (PROVENTIL HFA, VENTOLIN HFA, PROAIR HFA) 90 mcg/actuation inhaler, Take 1-2 Puffs by inhalation every four (4) hours as needed. , Disp: 1 Inhaler, Rfl: 0 
  ketoconazole (NIZORAL) 2 % shampoo, APPLY A DIME SIZE AMOUNT AS NEEDED TO SCALP, Disp: 120 mL, Rfl: 1 No Known Allergies Review of Systems: A comprehensive review of systems was negative except for: Neurological: positive for paresthesia and Numbness and tingling Vitals:  
 11/26/19 1132 BP: 106/66 Pulse: 76 Resp: 12 SpO2: 98% Weight: 155 lb (70.3 kg) Height: 5' 6\" (1.676 m) Objective: I 
 
 
NEUROLOGICAL EXAM: 
 
Appearance: The patient is well developed, well nourished, provides a coherent history and is in no acute distress. Mental Status: Oriented to time, place and person, and the president, cognitive function is normal and speech is fluent and no aphasia or dysarthria. Mood and affect appropriate. Cranial Nerves:   Intact visual fields. Fundi are benign, but she has slight optic pallor bilaterally. REGINE, EOM's full, no nystagmus, no ptosis. Facial sensation is normal. Corneal reflexes are not tested. Facial movement is symmetric. Hearing is normal bilaterally. Palate is midline with normal sternocleidomastoid and trapezius muscles are normal. Tongue is midline. Neck without meningismus or bruits, but patient has a clear Lhermitte's sign present on neck flexion Motor:  5/5 strength in upper and lower proximal and distal muscles. Normal bulk and tone. No fasciculations. Rapid altering movement is a little slow in both feet. Reflexes:   Deep tendon reflexes 3+/4 and symmetrical in the legs, and 2+/4 in the arms bilaterally and symmetrically. No babinski or clonus present Sensory:   Normal to touch, pinprick and vibration and temperature. DSS is intact Gait:  Normal gait, perhaps slightly stiff and spastic. Tremor:   No tremor noted. Cerebellar:  No cerebellar signs present. Neurovascular:  Normal heart sounds and regular rhythm, peripheral pulses intact, and no carotid bruits. Assessment: ICD-10-CM ICD-9-CM 1. Multiple sclerosis ([facility] [address] G35 340 MRI BRAIN W WO CONT  
   MRI CERV SPINE W WO CONT 2. 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  
  341.21 MRI CERV SPINE W WO CONT 3. B12 deficiency E53.8 266.2 MRI BRAIN W WO CONT  
   MRI CERV SPINE W WO CONT 4. Vitamin D deficiency E55.9 268.9 MRI BRAIN W WO CONT  
   MRI CERV SPINE W WO CONT Active Problems: * No active hospital problems. * 
 
 
Plan: She with new problem of sudden weakness in her legs, right greater than left associated with severe burning pain in the left leg, most likely reflecting a flareup of her MS, she is given a Medrol Dosepak, and will get reimaging of her brain with cervical MRI and brain MRI to rule out recurrent symptoms. She may need to change in her disease modifying drug. If her symptoms get worse she may need IV steroids but she is already much better she does not really want that now.  
Patient's previous 2017 MRI scan reviewed personally on the PACS system with the patient today, both the cervical and brain scans with her, we discussed the meaning that this means her disease seems stable on Copaxone therapy, and I would recommend she continue this drug as it seems to be holding her well. For her myelitis symptoms, she will continue the Neurontin which seems to be providing symptomatic and therapeutic relief. She has had no side effects on the medications. She does not seem to have that much spasticity or need for other medication at this time We encouraged her to remain mentally and physically active, take vitamins and vitamin D every day, and eat a good diet, and make sure she takes her medications. We will see her again in 6 months time or earlier as need be for follow-up, she is advised he needs to call us right away if she has any other symptoms in the interim. We also discussed some of the other disease options medications available but after a long talk, we will continue her current treatment as the best for her at this time. 45 minutes spent with the patient, 20 minutes counseling her on her disease, results of her test, meaning of the test results, and the fact that with these results I recommend she continue her Copaxone. Patient to finish her oral steroids and call us if there is any worsening and we can then proceed with IV steroids. Kayley Khoury Signed By: Haley Tomlinson MD   
 November 26, 2019 CC: Miles Nguyen MD 
FAX: [phone] This note will not be viewable in [address] If you have questions, please do not hesitate to call me. I look forward to following your patient along with you. Sincerely, Haley Tomlinson MD

## 2019-11-27 NOTE — PROGRESS NOTES
Consult  REFERRED BY:  Sridevi Hernadez MD    CHIEF COMPLAINT: Numbness and tingling in her legs      Subjective: Jax Wilkerson is a 54 y.o. right-handed -American female, seen urgent work in basis at the request of Dr. Isatu Robb for evaluation of new problem of sudden weakness in her right ankle and lower leg while she was working today, to the point that she could not walk and had to sit down for a while, and then quickly followed by a burning intense painful numbness in her left leg that is just gradually beginning to resolve. The spell lasted about half an hour and seems to be slowly getting better and had no involvement of her vision, cranial nerves, or her arms or bowel or bladder. She had an MRI scan done 2 years ago that showed stable disease of the neck and brain on Copaxone therapy. She had no fever, no rashes, no increased back pain, no other cause of her symptoms except her MS. Since she is getting better already we gave her a Medrol Dosepak, but we will reimage her brain and cervical spine to make sure she is stable on Copaxone therapy may need to switch her therapy to another disease modifying drug. The patient was diagnosed with multiple sclerosis in 2001, I believe at the [facility], and was treated with Copaxone after a workup including MRIs and complete neurological evaluation. Her MRI scan of the brain shows several white matter lesions typical of demyelinating disease multiple sclerosis, an MRI scan of the cervical spine shows no active lesions, just mild degenerative changes. She has no enhancing lesions which is all good for stability of her MS. Her sensory Lhermitte sign and symptoms are much better on Neurontin 300 mg 2-3 times a day. She is able to function well now, and was given steroids without much effect. Patient had a relapse in 2008 that was treated with IV steroids but her Copaxone was maintained. .  She has no symptoms in her arms at this time. She has had no cranial nerve symptoms, except that she has had some blurred vision in her right eye has been persistent for several weeks. .  She had no unusual viruses, infections, drug exposure, toxin, injury, or any other cause for her symptoms. She has had no other new medical problems, complications or illnesses. Past Medical History:   Diagnosis Date    Asthma     MS (multiple sclerosis) ([facility] [address]       Past Surgical History:   Procedure Laterality Date    HX APPENDECTOMY      HX CHOLECYSTECTOMY      HX GYN      c/sec     Family History   Problem Relation Age of Onset    Hypertension Mother     Other Mother         Hemoglobin C disorder    Asthma Father     Other Maternal Aunt         Hemoglobin C disorder    Cancer Paternal Uncle         pancreatic    Heart Disease Paternal Uncle     Cancer Paternal Grandmother     Heart Disease Paternal Grandfather     Asthma Child       Social History     Tobacco Use    Smoking status: Never Smoker    Smokeless tobacco: Never Used   Substance Use Topics    Alcohol use: Yes     Alcohol/week: 2.0 standard drinks     Types: 2 Glasses of wine per week     Comment: 4-5 drinks per week         Current Outpatient Medications:     glatiramer (COPAXONE) 20 mg/mL injection, 20 mg by SubCUTAneous route daily. BRAND MEDICALLY NECESSARY, Disp: 30 Syringe, Rfl: 11    methylPREDNISolone (MEDROL DOSEPACK) 4 mg tablet, USE AS DIRECTED, Disp: 1 Dose Pack, Rfl: 1    progesterone (PROMETRIUM) 200 mg capsule, Take 1 Cap by mouth daily. , Disp: 10 Cap, Rfl: 0    desog-e.estradiol/e.estradiol (KARIVA) 0.15-0.02 mgx21 /0.01 mg x 5 tab, Take 1 Tab by mouth daily. , Disp: 1 Package, Rfl: 12    gabapentin (NEURONTIN) 300 mg capsule, Take 2 Caps by mouth three (3) times daily. , Disp: 540 Cap, Rfl: 3    PARoxetine (PAXIL) 10 mg tablet, TAKE 1 AND A HALF TABLET BY MOUTH EVERY DAY, Disp: 45 Tab, Rfl: 0    traZODone (DESYREL) 100 mg tablet, TAKE 1 TABLET BY MOUTH EVERY DAY AT NIGHT, Disp: 30 Tab, Rfl: 1    ALPRAZolam (XANAX) 2 mg tablet, Take 1 Tab by mouth daily as needed for Anxiety. , Disp: 30 Tab, Rfl: 3    latanoprost (XALATAN) 0.005 % ophthalmic solution, , Disp: , Rfl:     albuterol (PROVENTIL HFA, VENTOLIN HFA, PROAIR HFA) 90 mcg/actuation inhaler, Take 1-2 Puffs by inhalation every four (4) hours as needed. , Disp: 1 Inhaler, Rfl: 0    ketoconazole (NIZORAL) 2 % shampoo, APPLY A DIME SIZE AMOUNT AS NEEDED TO SCALP, Disp: 120 mL, Rfl: 1        No Known Allergies     Review of Systems:  A comprehensive review of systems was negative except for: Neurological: positive for paresthesia and Numbness and tingling   Vitals:    11/26/19 1132   BP: 106/66   Pulse: 76   Resp: 12   SpO2: 98%   Weight: 155 lb (70.3 kg)   Height: 5' 6\" (1.676 m)     Objective:     I      NEUROLOGICAL EXAM:    Appearance: The patient is well developed, well nourished, provides a coherent history and is in no acute distress. Mental Status: Oriented to time, place and person, and the president, cognitive function is normal and speech is fluent and no aphasia or dysarthria. Mood and affect appropriate. Cranial Nerves:   Intact visual fields. Fundi are benign, but she has slight optic pallor bilaterally. REGINE, EOM's full, no nystagmus, no ptosis. Facial sensation is normal. Corneal reflexes are not tested. Facial movement is symmetric. Hearing is normal bilaterally. Palate is midline with normal sternocleidomastoid and trapezius muscles are normal. Tongue is midline. Neck without meningismus or bruits, but patient has a clear Lhermitte's sign present on neck flexion   Motor:  5/5 strength in upper and lower proximal and distal muscles. Normal bulk and tone. No fasciculations. Rapid altering movement is a little slow in both feet. Reflexes:   Deep tendon reflexes 3+/4 and symmetrical in the legs, and 2+/4 in the arms bilaterally and symmetrically.   No babinski or clonus present   Sensory:   Normal to touch, pinprick and vibration and temperature. DSS is intact   Gait:  Normal gait, perhaps slightly stiff and spastic. Tremor:   No tremor noted. Cerebellar:  No cerebellar signs present. Neurovascular:  Normal heart sounds and regular rhythm, peripheral pulses intact, and no carotid bruits. Assessment:       ICD-10-CM ICD-9-CM    1. Multiple sclerosis (HCC) G35 340 MRI BRAIN W WO CONT      MRI CERV SPINE W WO CONT   2. 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 B12 deficiency E53.8 266.2 MRI BRAIN W WO CONT      MRI CERV SPINE W WO CONT   4. Vitamin D deficiency E55.9 268.9 MRI BRAIN W WO CONT      MRI CERV SPINE W WO CONT     Active Problems:    * No active hospital problems. *      Plan:     She with new problem of sudden weakness in her legs, right greater than left associated with severe burning pain in the left leg, most likely reflecting a flareup of her MS, she is given a Medrol Dosepak, and will get reimaging of her brain with cervical MRI and brain MRI to rule out recurrent symptoms. She may need to change in her disease modifying drug. If her symptoms get worse she may need IV steroids but she is already much better she does not really want that now. Patient's previous 2017 MRI scan reviewed personally on the PACS system with the patient today, both the cervical and brain scans with her, we discussed the meaning that this means her disease seems stable on Copaxone therapy, and I would recommend she continue this drug as it seems to be holding her well. For her myelitis symptoms, she will continue the Neurontin which seems to be providing symptomatic and therapeutic relief. She has had no side effects on the medications.   She does not seem to have that much spasticity or need for other medication at this time  We encouraged her to remain mentally and physically active, take vitamins and vitamin D every day, and eat a good diet, and make sure she takes her medications. We will see her again in 6 months time or earlier as need be for follow-up, she is advised he needs to call us right away if she has any other symptoms in the interim. We also discussed some of the other disease options medications available but after a long talk, we will continue her current treatment as the best for her at this time. 45 minutes spent with the patient, 20 minutes counseling her on her disease, results of her test, meaning of the test results, and the fact that with these results I recommend she continue her Copaxone. Patient to finish her oral steroids and call us if there is any worsening and we can then proceed with IV steroids. .    Signed By: Meg Paulino MD     November 26, 2019       CC: Ct Zheng MD  FAX: [phone]    This note will not be viewable in [address]

## 2019-12-02 ENCOUNTER — PATIENT MESSAGE (OUTPATIENT)
Dept: NEUROLOGY | Age: 49
End: 2019-12-02

## 2019-12-02 DIAGNOSIS — G35 MULTIPLE SCLEROSIS (HCC): ICD-10-CM

## 2019-12-02 DIAGNOSIS — G37.3 ACUTE TRANSVERSE MYELITIS IN DEMYELINATING DISEASE OF CENTRAL NERVOUS SYSTEM (HCC): ICD-10-CM

## 2019-12-02 RX ORDER — GLATIRAMER ACETATE 20 MG/ML
20 INJECTION, SOLUTION SUBCUTANEOUS DAILY
Qty: 30 SYRINGE | Refills: 11 | Status: SHIPPED | OUTPATIENT
Start: 2019-12-02 | End: 2021-02-15 | Stop reason: CLARIF

## 2019-12-02 NOTE — TELEPHONE ENCOUNTER
Pharmacy changed.  Sent to correct pharmacy per verbal order with readback from Dr. Reymundo Madrigal

## 2019-12-10 ENCOUNTER — HOSPITAL ENCOUNTER (OUTPATIENT)
Dept: MRI IMAGING | Age: 49
Discharge: HOME | End: 2019-12-10
Attending: PSYCHIATRY & NEUROLOGY
Payer: COMMERCIAL

## 2019-12-10 ENCOUNTER — TELEPHONE (OUTPATIENT)
Dept: NEUROLOGY | Age: 49
End: 2019-12-10

## 2019-12-10 ENCOUNTER — DOCUMENTATION ONLY (OUTPATIENT)
Dept: NEUROLOGY | Age: 49
End: 2019-12-10

## 2019-12-10 VITALS — BODY MASS INDEX: 25.02 KG/M2 | WEIGHT: 155 LBS

## 2019-12-10 DIAGNOSIS — E53.8 B12 DEFICIENCY: ICD-10-CM

## 2019-12-10 DIAGNOSIS — E55.9 VITAMIN D DEFICIENCY: ICD-10-CM

## 2019-12-10 DIAGNOSIS — G35 MULTIPLE SCLEROSIS (HCC): ICD-10-CM

## 2019-12-10 DIAGNOSIS — G37.3 ACUTE TRANSVERSE MYELITIS IN DEMYELINATING DISEASE OF CENTRAL NERVOUS SYSTEM (HCC): ICD-10-CM

## 2019-12-10 PROCEDURE — 72156 MRI NECK SPINE W/O & W/DYE: CPT

## 2019-12-10 PROCEDURE — 74011250636 HC RX REV CODE- 250/636: Performed by: PSYCHIATRY & NEUROLOGY

## 2019-12-10 PROCEDURE — 70553 MRI BRAIN STEM W/O & W/DYE: CPT

## 2019-12-10 PROCEDURE — A9575 INJ GADOTERATE MEGLUMI 0.1ML: HCPCS | Performed by: PSYCHIATRY & NEUROLOGY

## 2019-12-10 RX ORDER — GADOTERATE MEGLUMINE 376.9 MG/ML
15 INJECTION INTRAVENOUS ONCE
Status: COMPLETED | OUTPATIENT
Start: 2019-12-10 | End: 2019-12-10

## 2019-12-10 RX ADMIN — GADOTERATE MEGLUMINE 15 ML: 376.9 INJECTION INTRAVENOUS at 12:02

## 2019-12-10 NOTE — PROGRESS NOTES
I called the patient, no answer, left message to call back about her MRI scan because she had a few new lesions and may need to change therapy

## 2019-12-12 ENCOUNTER — OFFICE VISIT (OUTPATIENT)
Dept: NEUROLOGY | Age: 49
End: 2019-12-12

## 2019-12-12 VITALS
WEIGHT: 155 LBS | BODY MASS INDEX: 24.91 KG/M2 | OXYGEN SATURATION: 99 % | DIASTOLIC BLOOD PRESSURE: 80 MMHG | HEIGHT: 66 IN | SYSTOLIC BLOOD PRESSURE: 118 MMHG | HEART RATE: 85 BPM

## 2019-12-12 DIAGNOSIS — G35 MULTIPLE SCLEROSIS (HCC): Primary | ICD-10-CM

## 2019-12-12 DIAGNOSIS — E55.9 VITAMIN D DEFICIENCY: ICD-10-CM

## 2019-12-12 DIAGNOSIS — E53.8 B12 DEFICIENCY: ICD-10-CM

## 2019-12-12 DIAGNOSIS — G37.3 ACUTE TRANSVERSE MYELITIS IN DEMYELINATING DISEASE OF CENTRAL NERVOUS SYSTEM (HCC): ICD-10-CM

## 2019-12-12 NOTE — LETTER
12/12/19 Patient: Gary Antonio YOB: 1970 Date of Visit: 12/12/2019 Paty Vera MD 
[address] [facility] Floor [address] [place] [id number] VIA Facsimile: [phone] Dear Paty Vera MD, Thank you for referring Ms. Benjamin Emerson to [address] [place] [address] for evaluation. My notes for this consultation are attached. Consult REFERRED BY: 
Angel Casas MD 
 
CHIEF COMPLAINT: Numbness and tingling in her legs Subjective: Gary Antonio is a 54 y.o. right-handed -American female, seen on urgent work in basis at the request of Dr. Leyla Herrera for evaluation of new problem of abnormal MRI scan, showing several new lesions and progression of her disease from her previous MRI scan 2017 from the scan that was just done 2 days ago here. There were no enhancing lesions to indicate acute progression but clearly she has had progression of disease. She is also had several clinical attacks requiring steroids over the last year. Her cervical MRI scan showed no active disease or evidence of MS disease. We discussed other therapeutic options with the patient in detail, and explained the risk and benefits, and she wants no part of PML, and wants medication and has no risk of PML, and has a very sensitive stomach and wants a medication that is not going to upset her stomach as she already has some reflux and gastritis. She is afraid of Tecfidera for that reason. She also does not want any injectables and absolutely refuses any interferons and is just failed Copaxone. After all the risks and benefits were explained the patient does agree to try Aubagio and a starter form was sent in and appropriate lab studies drawn. Mike Connors She will get monthly blood tests as directed.   She was just seen 2 weeks ago for an exacerbation manifest by sudden weakness in her right ankle and lower leg while she was working today, to the point that she could not walk and had to sit down for a while, and then quickly followed by a burning intense painful numbness in her left leg that is just gradually beginning to resolve. The spell lasted about half an hour and seems to be slowly getting better and had no involvement of her vision, cranial nerves, or her arms or bowel or bladder. She had an MRI scan done 2 years ago that showed stable disease of the neck and brain on Copaxone therapy. She had no fever, no rashes, no increased back pain, no other cause of her symptoms except her MS. Since she is getting better already we gave her a Medrol Dosepak. The patient was diagnosed with multiple sclerosis in 2001, I believe at the [facility], and was treated with Copaxone after a workup including MRIs and complete neurological evaluation. Her MRI scan of the brain shows several white matter lesions typical of demyelinating disease multiple sclerosis, an MRI scan of the cervical spine shows no active lesions, just mild degenerative changes. She has no enhancing lesions which is all good for stability of her MS. Her sensory Lhermitte sign and symptoms are much better on Neurontin 300 mg 2-3 times a day. She is able to function well now, and was given steroids without much effect. Patient had a relapse in 2008 that was treated with IV steroids but her Copaxone was maintained. .  She has no symptoms in her arms at this time. She has had no cranial nerve symptoms, except that she has had some blurred vision in her right eye has been persistent for several weeks. .  She had no unusual viruses, infections, drug exposure, toxin, injury, or any other cause for her symptoms. She has had no other new medical problems, complications or illnesses. Past Medical History:  
Diagnosis Date  Asthma  MS (multiple sclerosis) ([facility] [address] Past Surgical History:  
Procedure Laterality Date  HX APPENDECTOMY  HX CHOLECYSTECTOMY  HX GYN    
 c/sec Family History Problem Relation Age of Onset  Hypertension Mother Malinda Lynn Other Mother Hemoglobin C disorder  Asthma Father  Other Maternal Aunt Hemoglobin C disorder  Cancer Paternal Uncle   
     pancreatic  Heart Disease Paternal Uncle  Cancer Paternal Grandmother  Heart Disease Paternal Grandfather  Asthma Child  Breast Cancer Sister Social History Tobacco Use  Smoking status: Never Smoker  Smokeless tobacco: Never Used Substance Use Topics  Alcohol use: Yes Alcohol/week: 2.0 standard drinks Types: 2 Glasses of wine per week Comment: 4-5 drinks per week Current Outpatient Medications:  
  glatiramer (COPAXONE) 20 mg/mL injection, 20 mg by SubCUTAneous route daily. BRAND MEDICALLY NECESSARY, Disp: 30 Syringe, Rfl: 11 
  ketoconazole (NIZORAL) 2 % shampoo, APPLY A DIME SIZE AMOUNT AS NEEDED TO SCALP, Disp: 120 mL, Rfl: 1 
  traZODone (DESYREL) 100 mg tablet, TAKE 1 TABLET BY MOUTH EVERY DAY AT NIGHT, Disp: 30 Tab, Rfl: 1   ALPRAZolam (XANAX) 2 mg tablet, Take 1 Tab by mouth daily as needed for Anxiety. , Disp: 30 Tab, Rfl: 3 
  latanoprost (XALATAN) 0.005 % ophthalmic solution, , Disp: , Rfl:  
  albuterol (PROVENTIL HFA, VENTOLIN HFA, PROAIR HFA) 90 mcg/actuation inhaler, Take 1-2 Puffs by inhalation every four (4) hours as needed. , Disp: 1 Inhaler, Rfl: 0 No Known Allergies Review of Systems: A comprehensive review of systems was negative except for: Neurological: positive for paresthesia and Numbness and tingling Vitals:  
 12/12/19 0910 BP: 118/80 Pulse: 85 SpO2: 99% Weight: 155 lb (70.3 kg) Height: 5' 6\" (1.676 m) Objective: I 
 
 
NEUROLOGICAL EXAM: 
 
Appearance: The patient is well developed, well nourished, provides a coherent history and is in no acute distress. Mental Status: Oriented to time, place and person, and the president, cognitive function is normal and speech is fluent and no aphasia or dysarthria. Mood and affect appropriate. Cranial Nerves:   Intact visual fields. Fundi are benign, but she has slight optic pallor bilaterally. REGINE, EOM's full, no nystagmus, no ptosis. Facial sensation is normal. Corneal reflexes are not tested. Facial movement is symmetric. Hearing is normal bilaterally. Palate is midline with normal sternocleidomastoid and trapezius muscles are normal. Tongue is midline. Neck without meningismus or bruits, but patient has a clear Lhermitte's sign present on neck flexion Motor:  5/5 strength in upper and lower proximal and distal muscles. Normal bulk and tone. No fasciculations. Rapid altering movement is a little slow in both feet. Reflexes:   Deep tendon reflexes 3+/4 and symmetrical in the legs, and 2+/4 in the arms bilaterally and symmetrically. No babinski or clonus present Sensory:   Normal to touch, pinprick and vibration and temperature. DSS is intact Gait:  Normal gait, perhaps slightly stiff and spastic. Tremor:   No tremor noted. Cerebellar:  No cerebellar signs present. Neurovascular:  Normal heart sounds and regular rhythm, peripheral pulses intact, and no carotid bruits. Assessment: ICD-10-CM ICD-9-CM 1. Multiple sclerosis ([facility] [address] G35 340 CBC WITH AUTOMATED DIFF  
   METABOLIC PANEL, COMPREHENSIVE QUANTIFERON-TB GOLD PLUS  
   CBC WITH AUTOMATED DIFF  
   METABOLIC PANEL, COMPREHENSIVE 2. 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  
  521.48 METABOLIC PANEL, COMPREHENSIVE QUANTIFERON-TB GOLD PLUS  
   CBC WITH AUTOMATED DIFF  
   METABOLIC PANEL, COMPREHENSIVE 3. B12 deficiency E53.8 266.2 CBC WITH AUTOMATED DIFF  
   METABOLIC PANEL, COMPREHENSIVE    QUANTIFERON-TB GOLD PLUS  
 CBC WITH AUTOMATED DIFF  
   METABOLIC PANEL, COMPREHENSIVE 4. Vitamin D deficiency E55.9 268.9 CBC WITH AUTOMATED DIFF  
   METABOLIC PANEL, COMPREHENSIVE QUANTIFERON-TB GOLD PLUS  
   CBC WITH AUTOMATED DIFF  
   METABOLIC PANEL, COMPREHENSIVE Active Problems: * No active hospital problems. * 
 
 
Plan:  
 
Patient with new problem of progression of disease on Copaxone, and recurring evidence of clinical attacks, so we discussed other therapeutic options as above and she would make a decision to try Aubagio, and we will get the appropriate testing needed every month. Screening labs done today also. Patient seen 2 weeks ago for new problem of sudden weakness in her legs, right greater than left associated with severe burning pain in the left leg, most likely reflecting a flareup of her MS, she is given a Medrol Dosepak, and will get reimaging of her brain with cervical MRI and brain MRI to rule out recurrent symptoms. She needs to change her disease modifying drug. For her myelitis symptoms, she will continue the Neurontin which seems to be providing symptomatic and therapeutic relief. She has had no side effects on the medications. She does not seem to have that much spasticity or need for other medication at this time We encouraged her to remain mentally and physically active, take vitamins and vitamin D every day, and eat a good diet, and make sure she takes her medications. We will see her again in 6 months time or earlier as need be for follow-up, she is advised he needs to call us right away if she has any other symptoms in the interim. We also discussed some of the other disease options medications available but after a long talk, we will continue her current treatment as the best for her at this time.   45 minutes spent with the patient, 25 minutes counseling her on her disease, results of her test, meaning of the test results, and the fact that with these results I recommend she discontinue her Copaxone. Patient will be seen again in 3 months time or earlier if need be. Signed By: Alma Myers MD   
 December 12, 2019 CC: Ruby Ganser, MD 
FAX: [phone] This note will not be viewable in [address] If you have questions, please do not hesitate to call me. I look forward to following your patient along with you. Sincerely, Alma Myers MD

## 2019-12-13 NOTE — PROGRESS NOTES
Consult  REFERRED BY:  Alexsandra Casas MD    CHIEF COMPLAINT: Numbness and tingling in her legs      Subjective: Sunitha Orosco is a 54 y.o. right-handed -American female, seen on urgent work in basis at the request of Dr. Jhonny Klein for evaluation of new problem of abnormal MRI scan, showing several new lesions and progression of her disease from her previous MRI scan 2017 from the scan that was just done 2 days ago here. There were no enhancing lesions to indicate acute progression but clearly she has had progression of disease. She is also had several clinical attacks requiring steroids over the last year. Her cervical MRI scan showed no active disease or evidence of MS disease. We discussed other therapeutic options with the patient in detail, and explained the risk and benefits, and she wants no part of PML, and wants medication and has no risk of PML, and has a very sensitive stomach and wants a medication that is not going to upset her stomach as she already has some reflux and gastritis. She is afraid of Tecfidera for that reason. She also does not want any injectables and absolutely refuses any interferons and is just failed Copaxone. After all the risks and benefits were explained the patient does agree to try Aubagio and a starter form was sent in and appropriate lab studies drawn. Vy Blanc She will get monthly blood tests as directed. She was just seen 2 weeks ago for an exacerbation manifest by sudden weakness in her right ankle and lower leg while she was working today, to the point that she could not walk and had to sit down for a while, and then quickly followed by a burning intense painful numbness in her left leg that is just gradually beginning to resolve. The spell lasted about half an hour and seems to be slowly getting better and had no involvement of her vision, cranial nerves, or her arms or bowel or bladder.   She had an MRI scan done 2 years ago that showed stable disease of the neck and brain on Copaxone therapy. She had no fever, no rashes, no increased back pain, no other cause of her symptoms except her MS. Since she is getting better already we gave her a Medrol Dosepak. The patient was diagnosed with multiple sclerosis in 2001, I believe at the [facility], and was treated with Copaxone after a workup including MRIs and complete neurological evaluation. Her MRI scan of the brain shows several white matter lesions typical of demyelinating disease multiple sclerosis, an MRI scan of the cervical spine shows no active lesions, just mild degenerative changes. She has no enhancing lesions which is all good for stability of her MS. Her sensory Lhermitte sign and symptoms are much better on Neurontin 300 mg 2-3 times a day. She is able to function well now, and was given steroids without much effect. Patient had a relapse in 2008 that was treated with IV steroids but her Copaxone was maintained. .  She has no symptoms in her arms at this time. She has had no cranial nerve symptoms, except that she has had some blurred vision in her right eye has been persistent for several weeks. .  She had no unusual viruses, infections, drug exposure, toxin, injury, or any other cause for her symptoms. She has had no other new medical problems, complications or illnesses.     Past Medical History:   Diagnosis Date    Asthma     MS (multiple sclerosis) ([facility] [address]       Past Surgical History:   Procedure Laterality Date    HX APPENDECTOMY      HX CHOLECYSTECTOMY      HX GYN      c/sec     Family History   Problem Relation Age of Onset    Hypertension Mother     Other Mother         Hemoglobin C disorder    Asthma Father     Other Maternal Aunt         Hemoglobin C disorder    Cancer Paternal Uncle         pancreatic    Heart Disease Paternal Uncle     Cancer Paternal Grandmother     Heart Disease Paternal Grandfather     Asthma Child     Breast Cancer Sister       Social History     Tobacco Use    Smoking status: Never Smoker    Smokeless tobacco: Never Used   Substance Use Topics    Alcohol use: Yes     Alcohol/week: 2.0 standard drinks     Types: 2 Glasses of wine per week     Comment: 4-5 drinks per week         Current Outpatient Medications:     glatiramer (COPAXONE) 20 mg/mL injection, 20 mg by SubCUTAneous route daily. BRAND MEDICALLY NECESSARY, Disp: 30 Syringe, Rfl: 11    ketoconazole (NIZORAL) 2 % shampoo, APPLY A DIME SIZE AMOUNT AS NEEDED TO SCALP, Disp: 120 mL, Rfl: 1    traZODone (DESYREL) 100 mg tablet, TAKE 1 TABLET BY MOUTH EVERY DAY AT NIGHT, Disp: 30 Tab, Rfl: 1    ALPRAZolam (XANAX) 2 mg tablet, Take 1 Tab by mouth daily as needed for Anxiety. , Disp: 30 Tab, Rfl: 3    latanoprost (XALATAN) 0.005 % ophthalmic solution, , Disp: , Rfl:     albuterol (PROVENTIL HFA, VENTOLIN HFA, PROAIR HFA) 90 mcg/actuation inhaler, Take 1-2 Puffs by inhalation every four (4) hours as needed. , Disp: 1 Inhaler, Rfl: 0        No Known Allergies     Review of Systems:  A comprehensive review of systems was negative except for: Neurological: positive for paresthesia and Numbness and tingling   Vitals:    12/12/19 0910   BP: 118/80   Pulse: 85   SpO2: 99%   Weight: 155 lb (70.3 kg)   Height: 5' 6\" (1.676 m)     Objective:     I      NEUROLOGICAL EXAM:    Appearance: The patient is well developed, well nourished, provides a coherent history and is in no acute distress. Mental Status: Oriented to time, place and person, and the president, cognitive function is normal and speech is fluent and no aphasia or dysarthria. Mood and affect appropriate. Cranial Nerves:   Intact visual fields. Fundi are benign, but she has slight optic pallor bilaterally. REGINE, EOM's full, no nystagmus, no ptosis. Facial sensation is normal. Corneal reflexes are not tested. Facial movement is symmetric. Hearing is normal bilaterally.  Palate is midline with normal sternocleidomastoid and trapezius muscles are normal. Tongue is midline. Neck without meningismus or bruits, but patient has a clear Lhermitte's sign present on neck flexion   Motor:  5/5 strength in upper and lower proximal and distal muscles. Normal bulk and tone. No fasciculations. Rapid altering movement is a little slow in both feet. Reflexes:   Deep tendon reflexes 3+/4 and symmetrical in the legs, and 2+/4 in the arms bilaterally and symmetrically. No babinski or clonus present   Sensory:   Normal to touch, pinprick and vibration and temperature. DSS is intact   Gait:  Normal gait, perhaps slightly stiff and spastic. Tremor:   No tremor noted. Cerebellar:  No cerebellar signs present. Neurovascular:  Normal heart sounds and regular rhythm, peripheral pulses intact, and no carotid bruits. Assessment:       ICD-10-CM ICD-9-CM    1. Multiple sclerosis (HCC) G35 340 CBC WITH AUTOMATED DIFF      METABOLIC PANEL, COMPREHENSIVE      QUANTIFERON-TB GOLD PLUS      CBC WITH AUTOMATED DIFF      METABOLIC PANEL, COMPREHENSIVE   2. 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 B12 deficiency E53.8 266.2 CBC WITH AUTOMATED DIFF      METABOLIC PANEL, COMPREHENSIVE      QUANTIFERON-TB GOLD PLUS      CBC WITH AUTOMATED DIFF      METABOLIC PANEL, COMPREHENSIVE   4. Vitamin D deficiency E55.9 268.9 CBC WITH AUTOMATED DIFF      METABOLIC PANEL, COMPREHENSIVE      QUANTIFERON-TB GOLD PLUS      CBC WITH AUTOMATED DIFF      METABOLIC PANEL, COMPREHENSIVE     Active Problems:    * No active hospital problems.  *      Plan:     Patient with new problem of progression of disease on Copaxone, and recurring evidence of clinical attacks, so we discussed other therapeutic options as above and she would make a decision to try Aubagio, and we will get the appropriate testing needed every month. Screening labs done today also. Patient seen 2 weeks ago for new problem of sudden weakness in her legs, right greater than left associated with severe burning pain in the left leg, most likely reflecting a flareup of her MS, she is given a Medrol Dosepak, and will get reimaging of her brain with cervical MRI and brain MRI to rule out recurrent symptoms. She needs to change her disease modifying drug. For her myelitis symptoms, she will continue the Neurontin which seems to be providing symptomatic and therapeutic relief. She has had no side effects on the medications. She does not seem to have that much spasticity or need for other medication at this time  We encouraged her to remain mentally and physically active, take vitamins and vitamin D every day, and eat a good diet, and make sure she takes her medications. We will see her again in 6 months time or earlier as need be for follow-up, she is advised he needs to call us right away if she has any other symptoms in the interim. We also discussed some of the other disease options medications available but after a long talk, we will continue her current treatment as the best for her at this time. 45 minutes spent with the patient, 25 minutes counseling her on her disease, results of her test, meaning of the test results, and the fact that with these results I recommend she discontinue her Copaxone. Patient will be seen again in 3 months time or earlier if need be. Signed By: Júnior Mcfadden MD     December 12, 2019       CC: Jose Gardner MD  FAX: [phone]    This note will not be viewable in [address]

## 2019-12-20 LAB
ALBUMIN SERPL-MCNC: 4.5 G/DL (ref 3.5–5.5)
ALBUMIN/GLOB SERPL: 1.5 {RATIO} (ref 1.2–2.2)
ALP SERPL-CCNC: 59 IU/L (ref 39–117)
ALT SERPL-CCNC: 20 IU/L (ref 0–32)
AST SERPL-CCNC: 25 IU/L (ref 0–40)
BASOPHILS # BLD AUTO: 0 X10E3/UL (ref 0–0.2)
BASOPHILS NFR BLD AUTO: 1 %
BILIRUB SERPL-MCNC: 0.6 MG/DL (ref 0–1.2)
BUN SERPL-MCNC: 9 MG/DL (ref 6–24)
BUN/CREAT SERPL: 14 (ref 9–23)
CALCIUM SERPL-MCNC: 9.6 MG/DL (ref 8.7–10.2)
CHLORIDE SERPL-SCNC: 95 MMOL/L (ref 96–106)
CO2 SERPL-SCNC: 23 MMOL/L (ref 20–29)
CREAT SERPL-MCNC: 0.63 MG/DL (ref 0.57–1)
EOSINOPHIL # BLD AUTO: 0.2 X10E3/UL (ref 0–0.4)
EOSINOPHIL NFR BLD AUTO: 3 %
ERYTHROCYTE [DISTWIDTH] IN BLOOD BY AUTOMATED COUNT: 14.3 % (ref 12.3–15.4)
GAMMA INTERFERON BACKGROUND BLD IA-ACNC: 0.05 IU/ML
GLOBULIN SER CALC-MCNC: 3 G/DL (ref 1.5–4.5)
GLUCOSE SERPL-MCNC: 63 MG/DL (ref 65–99)
HCT VFR BLD AUTO: 38.6 % (ref 34–46.6)
HGB BLD-MCNC: 13 G/DL (ref 11.1–15.9)
IMM GRANULOCYTES # BLD AUTO: 0 X10E3/UL (ref 0–0.1)
IMM GRANULOCYTES NFR BLD AUTO: 0 %
LYMPHOCYTES # BLD AUTO: 1.6 X10E3/UL (ref 0.7–3.1)
LYMPHOCYTES NFR BLD AUTO: 24 %
M TB IFN-G BLD-IMP: NEGATIVE
M TB IFN-G CD4+ BCKGRND COR BLD-ACNC: 0.07 IU/ML
MCH RBC QN AUTO: 28.9 PG (ref 26.6–33)
MCHC RBC AUTO-ENTMCNC: 33.7 G/DL (ref 31.5–35.7)
MCV RBC AUTO: 86 FL (ref 79–97)
MITOGEN IGNF BLD-ACNC: 5.04 IU/ML
MONOCYTES # BLD AUTO: 0.6 X10E3/UL (ref 0.1–0.9)
MONOCYTES NFR BLD AUTO: 9 %
NEUTROPHILS # BLD AUTO: 4.3 X10E3/UL (ref 1.4–7)
NEUTROPHILS NFR BLD AUTO: 63 %
PLATELET # BLD AUTO: 249 X10E3/UL (ref 150–450)
POTASSIUM SERPL-SCNC: 3.9 MMOL/L (ref 3.5–5.2)
PROT SERPL-MCNC: 7.5 G/DL (ref 6–8.5)
QUANTIFERON INCUBATION, QF1T: NORMAL
QUANTIFERON TB2 AG: 0.09 IU/ML
RBC # BLD AUTO: 4.5 X10E6/UL (ref 3.77–5.28)
SERVICE CMNT-IMP: NORMAL
SODIUM SERPL-SCNC: 138 MMOL/L (ref 134–144)
WBC # BLD AUTO: 6.8 X10E3/UL (ref 3.4–10.8)

## 2019-12-27 ENCOUNTER — TELEPHONE (OUTPATIENT)
Dept: NEUROLOGY | Age: 49
End: 2019-12-27

## 2020-01-09 ENCOUNTER — DOCUMENTATION ONLY (OUTPATIENT)
Dept: NEUROLOGY | Age: 50
End: 2020-01-09

## 2020-01-09 RX ORDER — METHYLPREDNISOLONE 4 MG/1
TABLET ORAL
Qty: 1 DOSE PACK | Refills: 0 | Status: SHIPPED | OUTPATIENT
Start: 2020-01-09

## 2020-01-09 NOTE — PROGRESS NOTES
Patient now on Aubagio, seems to be helping some, says she got a little weaker, I will try another Medrol Dosepak because she does not want Solu-Medrol, and she still has some burning numbness and thought the first Medrol Dosepak helped  If she is not much better in a couple days after the steroids she may consider IV Solu-Medrol

## 2020-03-31 RX ORDER — ALBUTEROL SULFATE 90 UG/1
AEROSOL, METERED RESPIRATORY (INHALATION)
Qty: 8.5 INHALER | Refills: 0 | Status: SHIPPED | OUTPATIENT
Start: 2020-03-31

## 2020-03-31 NOTE — TELEPHONE ENCOUNTER
Refill request received for ProAir HFA. Pt last seen here in this office by me on 1/31/19, chart reviewed. Given her medical history and COVID-19 pandemic, will refill this time and not require office visit.

## 2020-04-15 RX ORDER — TERIFLUNOMIDE 14 MG/1
TABLET, FILM COATED ORAL
Qty: 90 TAB | Refills: 5 | Status: SHIPPED | OUTPATIENT
Start: 2020-04-15 | End: 2021-02-15 | Stop reason: SDUPTHER

## 2020-04-27 RX ORDER — KETOCONAZOLE 20 MG/ML
SHAMPOO TOPICAL
Qty: 120 ML | Refills: 0 | OUTPATIENT
Start: 2020-04-27

## 2020-04-28 NOTE — TELEPHONE ENCOUNTER
Called and spoke with patient and let her know   She states she's Ok at this time and will call if and when she needs a refill    Close enc

## 2020-06-26 ENCOUNTER — VIRTUAL VISIT (OUTPATIENT)
Dept: FAMILY MEDICINE CLINIC | Age: 50
End: 2020-06-26

## 2020-11-19 ENCOUNTER — TELEPHONE (OUTPATIENT)
Dept: NEUROLOGY | Age: 50
End: 2020-11-19

## 2020-11-19 NOTE — TELEPHONE ENCOUNTER
Prior authorization for Brenton rivera until 12/27/20. Belia Nogueira will work new authorization in December

## 2020-12-02 ENCOUNTER — TELEPHONE (OUTPATIENT)
Dept: NEUROLOGY | Age: 50
End: 2020-12-02

## 2020-12-03 NOTE — TELEPHONE ENCOUNTER
Maggie Arellano,     Please confirm if patient is continuing her care w/ us. (now w/ [facility])? I have request to renew her Aubagio.

## 2020-12-05 ENCOUNTER — TELEPHONE (OUTPATIENT)
Dept: NEUROLOGY | Age: 50
End: 2020-12-05

## 2020-12-07 ENCOUNTER — TELEPHONE (OUTPATIENT)
Dept: NEUROLOGY | Age: 50
End: 2020-12-07

## 2020-12-07 NOTE — TELEPHONE ENCOUNTER
Re: Glori Romberg PENDING    The plan will fax you a determination, typically within 1 to 5 business days. How do I follow up?   Drug  Aubagio 14MG tablets  Form  Zia Oden [address] and [employer] of [employer] Prior Authorization Form    Used for Prior Authorizations for [employer] and [employer] of [employer]    [phone]  [phone]

## 2021-01-11 ENCOUNTER — TELEPHONE (OUTPATIENT)
Dept: NEUROLOGY | Age: 51
End: 2021-01-11

## 2021-01-11 NOTE — TELEPHONE ENCOUNTER
Aubagio renewal - they had no record of the submission through [facility] on 12/5/20. Gave info over the phone,     Case approved [id number]  1/11/21 - 1/11/22.      Faxed to [employer] and faxed auth to [employer].

## 2021-01-22 NOTE — PROGRESS NOTES
Check blood pressure with an arm cuff at home twice daily. Sit in a chair with a back to it with your feet uncrossed/flat on the floor while resting your arm at heart level. After 5 minutes in this position, take your blood pressure on your bare arm. It should be more than 100/60 and less than 140/90. Please call me or send [employer]a message with blood pressure log in 2 weeks. Please let me know right away if you have more than 3 blood pressures over the upper limits listed above.    Continue spine exercises.   Progress Note: Weekly Education Class in the [facility] Program   Is there anything that you or the patient needs to let Dr Alma Delia Dickerson know about? no  Over the past week, have you experienced any side-effects? no    Cynthia Nguyen is a 42 y.o. female who is enrolled in [facility]    Visit Vitals    /80    Pulse 75    Ht 5' 6.5\" (1.689 m)    Wt 164 lb (74.4 kg)    BMI 26.07 kg/m2     Weight Metrics 1/24/2017 1/17/2017 1/10/2017 1/3/2017 1/3/2017 1/3/2017 12/15/2016   Weight 164 lb 167 lb 169 lb - 174 lb 170 lb 12.8 oz 173 lb 14.4 oz   Waist Measure Inches 35 35 38.5 - - - -   Body Fat % - - - 33.3 - - -   BMI 26.07 kg/m2 26.55 kg/m2 26.87 kg/m2 - 27.66 kg/m2 27.15 kg/m2 27.86 kg/m2         Have you received any other medical care this week? no  If yes, where and for what? Have you had any change in your medications since your last visit? no  If yes what? Did you have any problems adhering to the program last week? no  If yes, please explain:       Eating Habits Over Last Week:  Did you take in 64 oz of non-caloric fluids? yes     Did you consume your 4 meal replacements each day? yes with 2 boiled egg whites.        Physical Activity Over the Past Week:    Aerobic exercise: 0 min  Resistance exercise: 0 workouts / week

## 2021-11-06 DIAGNOSIS — G35 MULTIPLE SCLEROSIS (HCC): Primary | ICD-10-CM

## 2021-11-07 ENCOUNTER — DOCUMENTATION ONLY (OUTPATIENT)
Dept: NEUROLOGY | Age: 51
End: 2021-11-07

## 2021-11-07 LAB — B-HCG SERPL QL: POSITIVE MIU/ML

## 2021-11-07 NOTE — PROGRESS NOTES
Patient had possible positive urine pregnancy test, and I called the patient to check on this, but got no answer, so left message to call back and try to call her friend also but still got no message.

## 2022-01-31 ENCOUNTER — TELEPHONE (OUTPATIENT)
Dept: NEUROLOGY | Age: 52
End: 2022-01-31

## 2022-01-31 NOTE — TELEPHONE ENCOUNTER
Re: Papi Rios renewal request    Patient's last visit was Dec 2019. No future appt scheduled. Dr. Nakia Davis,     Do you want me to process the P.A. for Aubagio? The previous auth  22.

## 2022-02-10 ENCOUNTER — TELEPHONE (OUTPATIENT)
Dept: NEUROLOGY | Age: 52
End: 2022-02-10

## 2022-02-10 NOTE — TELEPHONE ENCOUNTER
Brenton renewal done Auth# [id number]   Date rnage 2/10/22 - 2/10/23  Did not send any office notes with it, only answered the PA questions. Last office visit here was in 2019. Faxed to [employer]. Lauro to Dr. Jordon Rios.

## 2022-02-21 RX ORDER — TERIFLUNOMIDE 14 MG/1
TABLET, FILM COATED ORAL
Qty: 30 TABLET | Refills: 4 | Status: SHIPPED | OUTPATIENT
Start: 2022-02-21

## 2022-03-19 PROBLEM — G37.3 ACUTE TRANSVERSE MYELITIS IN DEMYELINATING DISEASE OF CENTRAL NERVOUS SYSTEM (HCC): Status: ACTIVE | Noted: 2017-10-31

## 2022-03-19 PROBLEM — E53.8 B12 DEFICIENCY: Status: ACTIVE | Noted: 2017-10-31

## 2022-03-19 PROBLEM — E55.9 VITAMIN D DEFICIENCY: Status: ACTIVE | Noted: 2017-10-31

## 2022-03-19 PROBLEM — G35 MULTIPLE SCLEROSIS (HCC): Status: ACTIVE | Noted: 2017-10-31

## 2022-05-04 LAB — MAMMOGRAPHY, EXTERNAL: NORMAL

## 2022-08-19 ENCOUNTER — NURSE TRIAGE (OUTPATIENT)
Dept: OTHER | Facility: CLINIC | Age: 52
End: 2022-08-19

## 2022-08-19 NOTE — TELEPHONE ENCOUNTER
Received call from Cecilia at [facility] with Red Flag Complaint; Patient with Red Flag Complaint requesting to establish care with [facility]. Subjective: Caller states \"BP high at OBGYN\"     Current Symptoms:   148/105 May 9th at OBGYN appt  Rechecked last week at work, DBP was still 105  More difficulty breathing with exertion  Denies other symptoms  Not on BP medication    Onset: 1 week ago; unchanged    Associated Symptoms: NA    Pain Severity: denies    Temperature: not asked     What has been tried: NA    LMP: NA Pregnant: NA    Recommended disposition: See PCP within 3 Days, advised caller if unable to get an appointment in the suggested time frame to go to an [facility], caller agreeable. Care advice provided, patient verbalizes understanding; denies any other questions or concerns; instructed to call back for any new or worsening symptoms. Patient/Caller agrees with recommended disposition; writer provided warm transfer to Bock at [facility] for appointment scheduling. Attention Provider: Thank you for allowing me to participate in the care of your patient. The patient was connected to triage in response to information provided to the [facility]. Please do not respond through this encounter as the response is not directed to a shared pool.     Reason for Disposition   Systolic BP >= 880 OR Diastolic >= 860    Protocols used: Blood Pressure - High-ADULT-OH

## 2022-08-25 ENCOUNTER — OFFICE VISIT (OUTPATIENT)
Dept: FAMILY MEDICINE CLINIC | Age: 52
End: 2022-08-25
Payer: COMMERCIAL

## 2022-08-25 VITALS
WEIGHT: 168.2 LBS | BODY MASS INDEX: 27.03 KG/M2 | HEART RATE: 87 BPM | SYSTOLIC BLOOD PRESSURE: 120 MMHG | HEIGHT: 66 IN | TEMPERATURE: 99.1 F | DIASTOLIC BLOOD PRESSURE: 85 MMHG | OXYGEN SATURATION: 97 % | RESPIRATION RATE: 16 BRPM

## 2022-08-25 DIAGNOSIS — G37.3 ACUTE TRANSVERSE MYELITIS IN DEMYELINATING DISEASE OF CENTRAL NERVOUS SYSTEM (HCC): ICD-10-CM

## 2022-08-25 DIAGNOSIS — R06.09 DYSPNEA ON EXERTION: Primary | ICD-10-CM

## 2022-08-25 DIAGNOSIS — G35 MULTIPLE SCLEROSIS (HCC): ICD-10-CM

## 2022-08-25 LAB
ALBUMIN SERPL-MCNC: 3.9 G/DL (ref 3.5–5)
ALBUMIN/GLOB SERPL: 1.1 {RATIO} (ref 1.1–2.2)
ALP SERPL-CCNC: 40 U/L (ref 45–117)
ALT SERPL-CCNC: 17 U/L (ref 12–78)
ANION GAP SERPL CALC-SCNC: 8 MMOL/L (ref 5–15)
AST SERPL-CCNC: 11 U/L (ref 15–37)
BILIRUB SERPL-MCNC: 0.6 MG/DL (ref 0.2–1)
BUN SERPL-MCNC: 9 MG/DL (ref 6–20)
BUN/CREAT SERPL: 13 (ref 12–20)
CALCIUM SERPL-MCNC: 9.3 MG/DL (ref 8.5–10.1)
CHLORIDE SERPL-SCNC: 108 MMOL/L (ref 97–108)
CHOLEST SERPL-MCNC: 187 MG/DL
CO2 SERPL-SCNC: 25 MMOL/L (ref 21–32)
CREAT SERPL-MCNC: 0.67 MG/DL (ref 0.55–1.02)
ERYTHROCYTE [DISTWIDTH] IN BLOOD BY AUTOMATED COUNT: 14.1 % (ref 11.5–14.5)
GLOBULIN SER CALC-MCNC: 3.7 G/DL (ref 2–4)
GLUCOSE SERPL-MCNC: 100 MG/DL (ref 65–100)
HCT VFR BLD AUTO: 39.8 % (ref 35–47)
HCV AB SERPL QL IA: NONREACTIVE
HDLC SERPL-MCNC: 64 MG/DL
HDLC SERPL: 2.9 {RATIO} (ref 0–5)
HGB BLD-MCNC: 13.1 G/DL (ref 11.5–16)
LDLC SERPL CALC-MCNC: 112.4 MG/DL (ref 0–100)
MCH RBC QN AUTO: 29 PG (ref 26–34)
MCHC RBC AUTO-ENTMCNC: 32.9 G/DL (ref 30–36.5)
MCV RBC AUTO: 88.2 FL (ref 80–99)
NRBC # BLD: 0 K/UL (ref 0–0.01)
NRBC BLD-RTO: 0 PER 100 WBC
PLATELET # BLD AUTO: 275 K/UL (ref 150–400)
PMV BLD AUTO: 11.8 FL (ref 8.9–12.9)
POTASSIUM SERPL-SCNC: 4.5 MMOL/L (ref 3.5–5.1)
PROT SERPL-MCNC: 7.6 G/DL (ref 6.4–8.2)
RBC # BLD AUTO: 4.51 M/UL (ref 3.8–5.2)
SODIUM SERPL-SCNC: 141 MMOL/L (ref 136–145)
TRIGL SERPL-MCNC: 53 MG/DL (ref ?–150)
TSH SERPL DL<=0.05 MIU/L-ACNC: 0.81 UIU/ML (ref 0.36–3.74)
VLDLC SERPL CALC-MCNC: 10.6 MG/DL
WBC # BLD AUTO: 5.3 K/UL (ref 3.6–11)

## 2022-08-25 PROCEDURE — 99214 OFFICE O/P EST MOD 30 MIN: CPT | Performed by: STUDENT IN AN ORGANIZED HEALTH CARE EDUCATION/TRAINING PROGRAM

## 2022-08-25 RX ORDER — NORETHINDRONE ACETATE AND ETHINYL ESTRADIOL 1; 20 MG/1; UG/1
1 TABLET ORAL DAILY
COMMUNITY
Start: 2022-07-26

## 2022-08-25 NOTE — PROGRESS NOTES
Subjective   CC: Deisi Campbell is a 50 y.o. female who presents for establish care appt and dyspnea. Dyspnea: Ongoing x months. No wheezing noted w sxs onset. Notices mainly w climbing upstairs, even at a slow pace. No CP, palp, edema, weight gain associated w symptoms. Has h/o exercise-induced asthma, but feels this issue is different. Baseline activity level has been lower than usual b/c she has difficulty w exercise in setting of her MS. MS: F/b [place]. Cornelius Rodriguez [place] [id number] Previously on Copaxone for MS for a long time, now recently switched to Aubagio when Copaxone was no longer effective. Last flare was after an exercise stress test w cardiology. Weight management: Has recently encountered issues w weight gain and difficulty with weight management - states that in the past, she had been strict w keto diet, and was able to keep weight off. However, since recent trip to [facility], has increased her consumption of carbs. On diet recall yesterday, states she had [place] lentils then take out [employer] for dinner (rice, vegetables, scallops). Drinks water and [employer] mainly. Enjoys sweet andrea, about 3-4x a week. Working on finding an exercise regimen that works well for her given her h/o MS and dyspnea, notes that she likes the dance video games on her son's juan console. Psychosocial: , has a son. Works at [employer]. HM:   Flu: annually  Tdap: last 10 yrs  COVID: vaccinated and boosted x 1    Complete ROS performed and pertinent positives/negatives are documented in HPI. Past Medical History  Past Medical History:   Diagnosis Date    Asthma     MS (multiple sclerosis) ([facility] [address]        Current Medications  Current Outpatient Medications   Medication Sig Dispense Refill    Aubagio 14 mg tablet TAKE 1 TABLET BY MOUTH 1 TIME A DAY.  30 Tablet 4    ProAir HFA 90 mcg/actuation inhaler TAKE 1-2 PUFFS BY INHALATION EVERY FOUR (4) HOURS AS NEEDED. 8.5 Inhaler 0    traZODone (DESYREL) 100 mg tablet TAKE 1 TABLET BY MOUTH EVERY DAY AT NIGHT 30 Tab 1    ALPRAZolam (XANAX) 2 mg tablet Take 1 Tab by mouth daily as needed for Anxiety. 30 Tab 3    latanoprost (XALATAN) 0.005 % ophthalmic solution       Junel 1/20, 21, 1-20 mg-mcg tablet Take 1 Tablet by mouth daily. ketoconazole (NIZORAL) 2 % shampoo APPLY A DIME SIZE AMOUNT AS NEEDED TO SCALP (Patient not taking: Reported on 8/25/2022) 120 mL 0    methylPREDNISolone (MEDROL DOSEPACK) 4 mg tablet Use as directed (Patient not taking: Reported on 8/25/2022) 1 Dose Pack 0       Allergies  No Known Allergies    Social History   Social History     Socioeconomic History    Marital status:      Spouse name: Not on file    Number of children: Not on file    Years of education: Not on file    Highest education level: Not on file   Occupational History    Not on file   Tobacco Use    Smoking status: Never    Smokeless tobacco: Never   Substance and Sexual Activity    Alcohol use:  Yes     Alcohol/week: 2.0 standard drinks     Types: 2 Glasses of wine per week     Comment: 4-5 drinks per week    Drug use: No    Sexual activity: Yes     Partners: Male     Birth control/protection: None   Other Topics Concern    Not on file   Social History Narrative    Not on file     Social Determinants of Health     Financial Resource Strain: Not on file   Food Insecurity: Not on file   Transportation Needs: Not on file   Physical Activity: Not on file   Stress: Not on file   Social Connections: Not on file   Intimate Partner Violence: Not on file   Housing Stability: Not on file       Family History  Family History   Problem Relation Age of Onset    Hypertension Mother     Other Mother         Hemoglobin C disorder    Heart Attack Father     Asthma Father     Breast Cancer Sister     Cancer Paternal Grandmother     Heart Disease Paternal Grandfather     Asthma Child     Hypertension Maternal Aunt     Other Maternal Aunt         Hemoglobin C disorder    Cancer Paternal Uncle pancreatic    Heart Disease Paternal Uncle     Lung Disease Neg Hx        Objective   Vital Signs  Visit Vitals  /85 (BP 1 Location: Left upper arm, BP Patient Position: Sitting, BP Cuff Size: Large adult)   Pulse 87   Temp 99.1 °F (37.3 °C) (Oral)   Resp 16   Ht 5' 6\" (1.676 m)   Wt 168 lb 3.2 oz (76.3 kg)   LMP 06/01/2022   SpO2 97%   BMI 27.15 kg/m²       Physical Examination  Physical Exam  Constitutional:       Appearance: Normal appearance. HENT:      Head: Normocephalic and atraumatic. Eyes:      Conjunctiva/sclera: Conjunctivae normal.   Cardiovascular:      Rate and Rhythm: Normal rate and regular rhythm. Pulmonary:      Effort: Pulmonary effort is normal.      Breath sounds: Normal breath sounds. Abdominal:      General: Abdomen is flat. Palpations: Abdomen is soft. Musculoskeletal:         General: Normal range of motion. Cervical back: Normal range of motion and neck supple. Skin:     General: Skin is warm. Neurological:      General: No focal deficit present. Mental Status: She is alert and oriented to person, place, and time. Assessment and Plan   Wayne Kumari is a 50 y.o. who is here for dyspnea. .    1. Dyspnea on exertion  Ddx broad - cardiac in etiology - last stress test in 2017 and stress echo at the time were wnl. However, pt with significant cardiac fhx and on immunomodulator thx long term would put her at increased risk of cardiac dz. Another possibility includes pulmonary causes including weakening of respiratory muscles iso MS. Finally could be 2/2 deconditioning, but would r/o other causes first.  - PULMONARY FUNCTION TEST; Future  - ECHO ADULT COMPLETE; Future  - REFERRAL TO CARDIOLOGY  - CBC W/O DIFF; Future  - METABOLIC PANEL, COMPREHENSIVE; Future  - TSH 3RD GENERATION; Future  - LIPID PANEL; Future  - HEPATITIS C AB; Future  - HEPATITIS C AB  - LIPID PANEL  - TSH 3RD GENERATION  - METABOLIC PANEL, COMPREHENSIVE  - CBC W/O DIFF    2. Multiple sclerosis ([facility] [address]  F/b neurology.  - REFERRAL TO CARDIOLOGY  - CBC W/O DIFF; Future  - METABOLIC PANEL, COMPREHENSIVE; Future  - TSH 3RD GENERATION; Future  - LIPID PANEL; Future  - HEPATITIS C AB; Future  - HEPATITIS C AB  - LIPID PANEL  - TSH 3RD GENERATION  - METABOLIC PANEL, COMPREHENSIVE  - CBC W/O DIFF    3. 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 BMI 27.0-27.9,adult  D/w pt importance of dietary and lifestyle modifications. Will continue to f/up with pt to continue to create concrete goals at each visit and check in on dietary adjustments and modifications. RTC 1 month for f/up weight. Patient is counseled to return to the office if symptoms do not improve as expected. Urgent consultation with the nearest Emergency Department is strongly recommended if condition worsens. Patient is counseled to follow up as recommended and to inform the office if any changes in treatment are recommended.       I discussed this patient with Dr. Robin Albert (Attending Physician)       Signed By:  Ysabel Sutton MD  Family Medicine Resident

## 2022-08-26 PROBLEM — F41.1 GENERALIZED ANXIETY DISORDER: Status: ACTIVE | Noted: 2022-08-26

## 2022-08-26 PROBLEM — J45.909 ASTHMA: Status: ACTIVE | Noted: 2022-08-26

## 2022-08-26 PROBLEM — F40.10 SOCIAL PHOBIA: Status: ACTIVE | Noted: 2022-08-26

## 2022-08-29 NOTE — PROGRESS NOTES
Mammogram BI-RADS 1 (negative). Will repeat in 1 yr - would ideally be screened annually as patient with family history of breast CA (sister w breast cancer early in life per chart review).

## 2023-12-11 ENCOUNTER — OFFICE VISIT (OUTPATIENT)
Age: 53
End: 2023-12-11
Payer: COMMERCIAL

## 2023-12-11 VITALS
HEIGHT: 66 IN | WEIGHT: 137.4 LBS | TEMPERATURE: 98.7 F | DIASTOLIC BLOOD PRESSURE: 72 MMHG | HEART RATE: 75 BPM | RESPIRATION RATE: 16 BRPM | OXYGEN SATURATION: 100 % | BODY MASS INDEX: 22.08 KG/M2 | SYSTOLIC BLOOD PRESSURE: 110 MMHG

## 2023-12-11 DIAGNOSIS — Z51.81 THERAPEUTIC DRUG MONITORING: ICD-10-CM

## 2023-12-11 DIAGNOSIS — G37.3 ACUTE TRANSVERSE MYELITIS IN DEMYELINATING DISEASE OF CENTRAL NERVOUS SYSTEM (HCC): ICD-10-CM

## 2023-12-11 DIAGNOSIS — M25.512 PAIN OF LEFT SHOULDER WITH EXTERNAL ROTATION: ICD-10-CM

## 2023-12-11 DIAGNOSIS — F41.1 GENERALIZED ANXIETY DISORDER: ICD-10-CM

## 2023-12-11 DIAGNOSIS — M25.512 ACUTE PAIN OF LEFT SHOULDER: ICD-10-CM

## 2023-12-11 DIAGNOSIS — G35 MULTIPLE SCLEROSIS (HCC): Primary | ICD-10-CM

## 2023-12-11 LAB
ALBUMIN SERPL-MCNC: 4.2 G/DL (ref 3.5–5)
ALBUMIN/GLOB SERPL: 1.2 (ref 1.1–2.2)
ALP SERPL-CCNC: 54 U/L (ref 45–117)
ALT SERPL-CCNC: 13 U/L (ref 12–78)
ANION GAP SERPL CALC-SCNC: 5 MMOL/L (ref 5–15)
AST SERPL-CCNC: 7 U/L (ref 15–37)
BASOPHILS # BLD: 0 K/UL (ref 0–0.1)
BASOPHILS NFR BLD: 1 % (ref 0–1)
BILIRUB SERPL-MCNC: 0.6 MG/DL (ref 0.2–1)
BUN SERPL-MCNC: 13 MG/DL (ref 6–20)
BUN/CREAT SERPL: 15 (ref 12–20)
CALCIUM SERPL-MCNC: 8.6 MG/DL (ref 8.5–10.1)
CHLORIDE SERPL-SCNC: 115 MMOL/L (ref 97–108)
CO2 SERPL-SCNC: 24 MMOL/L (ref 21–32)
CREAT SERPL-MCNC: 0.88 MG/DL (ref 0.55–1.02)
DIFFERENTIAL METHOD BLD: ABNORMAL
EOSINOPHIL # BLD: 0.2 K/UL (ref 0–0.4)
EOSINOPHIL NFR BLD: 2 % (ref 0–7)
ERYTHROCYTE [DISTWIDTH] IN BLOOD BY AUTOMATED COUNT: 15.5 % (ref 11.5–14.5)
GLOBULIN SER CALC-MCNC: 3.5 G/DL (ref 2–4)
GLUCOSE SERPL-MCNC: 100 MG/DL (ref 65–100)
HCT VFR BLD AUTO: 40.3 % (ref 35–47)
HGB BLD-MCNC: 13.1 G/DL (ref 11.5–16)
IMM GRANULOCYTES # BLD AUTO: 0 K/UL (ref 0–0.04)
IMM GRANULOCYTES NFR BLD AUTO: 0 % (ref 0–0.5)
LYMPHOCYTES # BLD: 1.8 K/UL (ref 0.8–3.5)
LYMPHOCYTES NFR BLD: 25 % (ref 12–49)
MCH RBC QN AUTO: 28.8 PG (ref 26–34)
MCHC RBC AUTO-ENTMCNC: 32.5 G/DL (ref 30–36.5)
MCV RBC AUTO: 88.6 FL (ref 80–99)
MONOCYTES # BLD: 0.4 K/UL (ref 0–1)
MONOCYTES NFR BLD: 6 % (ref 5–13)
NEUTS SEG # BLD: 4.8 K/UL (ref 1.8–8)
NEUTS SEG NFR BLD: 66 % (ref 32–75)
NRBC # BLD: 0 K/UL (ref 0–0.01)
NRBC BLD-RTO: 0 PER 100 WBC
PLATELET # BLD AUTO: 267 K/UL (ref 150–400)
PMV BLD AUTO: 11.2 FL (ref 8.9–12.9)
POTASSIUM SERPL-SCNC: 4 MMOL/L (ref 3.5–5.1)
PROT SERPL-MCNC: 7.7 G/DL (ref 6.4–8.2)
RBC # BLD AUTO: 4.55 M/UL (ref 3.8–5.2)
SODIUM SERPL-SCNC: 144 MMOL/L (ref 136–145)
WBC # BLD AUTO: 7.2 K/UL (ref 3.6–11)

## 2023-12-11 PROCEDURE — 99214 OFFICE O/P EST MOD 30 MIN: CPT | Performed by: PSYCHIATRY & NEUROLOGY

## 2023-12-11 RX ORDER — MIRTAZAPINE 15 MG/1
TABLET, FILM COATED ORAL
COMMUNITY

## 2023-12-11 RX ORDER — TOPIRAMATE 100 MG/1
100 TABLET, FILM COATED ORAL DAILY
COMMUNITY
Start: 2023-03-05 | End: 2024-03-04

## 2023-12-11 RX ORDER — TOPIRAMATE 50 MG/1
TABLET, FILM COATED ORAL
COMMUNITY
Start: 2023-12-08

## 2023-12-11 RX ORDER — TERIFLUNOMIDE 14 MG/1
TABLET, FILM COATED ORAL
Qty: 30 TABLET | Status: CANCELLED | OUTPATIENT
Start: 2023-12-11

## 2023-12-11 RX ORDER — TERIFLUNOMIDE 14 MG/1
TABLET, FILM COATED ORAL
Qty: 90 TABLET | Refills: 4 | Status: ACTIVE | OUTPATIENT
Start: 2023-12-11

## 2023-12-11 RX ORDER — MEDROXYPROGESTERONE ACETATE 10 MG/1
TABLET ORAL
COMMUNITY

## 2023-12-11 RX ORDER — TRAZODONE HYDROCHLORIDE 150 MG/1
TABLET ORAL
COMMUNITY

## 2023-12-11 ASSESSMENT — PATIENT HEALTH QUESTIONNAIRE - PHQ9
SUM OF ALL RESPONSES TO PHQ QUESTIONS 1-9: 0
2. FEELING DOWN, DEPRESSED OR HOPELESS: 0
SUM OF ALL RESPONSES TO PHQ QUESTIONS 1-9: 0
1. LITTLE INTEREST OR PLEASURE IN DOING THINGS: 0
SUM OF ALL RESPONSES TO PHQ QUESTIONS 1-9: 0
SUM OF ALL RESPONSES TO PHQ9 QUESTIONS 1 & 2: 0
SUM OF ALL RESPONSES TO PHQ QUESTIONS 1-9: 0

## 2023-12-12 NOTE — PROGRESS NOTES
Consult  REFERRED BY:  Tracey Shepard MD    CHIEF COMPLAINT: Left shoulder pain for 6 months      Subjective: Raine Queen is a 48 y. o.right-handed -American female, seen on urgent work in basis after not being seen for almost 4 years, at the request of Dr. Yves Lopez for evaluation of new problem of patient having 6-month history of left shoulder pain that has somewhat progressed, and had a gradual onset without any trauma or injury that she is aware of, and concerned because she has MS and to make sure this is not an exacerbation of her MS but she has no major neck pain, no weakness and no numbness in the left arm or shoulder either. Any movement of the shoulder leg raising the shoulder seems to bring on the pain. Patient previously seen for MS and abnormal MRI scan, showing several new lesions and progression of her disease from her previous MRI scan 2017 from the scan that was just done 2 days ago here. There were no enhancing lesions to indicate acute progression but clearly she has had progression of disease. She is also had several clinical attacks requiring steroids over the last year. Her cervical MRI scan showed no active disease or evidence of MS disease. We discussed other therapeutic options with the patient in detail, and explained the risk and benefits, and she wants no part of PML, and wants medication and has no risk of PML, and has a very sensitive stomach and wants a medication that is not going to upset her stomach as she already has some reflux and gastritis. She is afraid of Tecfidera for that reason. She also does not want any injectables and absolutely refuses any interferons and is just failed Copaxone. After all the risks and benefits were explained the patient does agree to try Aubagio and a starter form was sent in and appropriate lab studies drawn. Maame Stanford She will get monthly blood tests as directed.   She was just seen 2 weeks ago for an exacerbation manifest by sudden

## 2024-07-18 ENCOUNTER — CLINICAL DOCUMENTATION (OUTPATIENT)
Age: 54
End: 2024-07-18

## 2024-07-18 NOTE — PROGRESS NOTES
I called the patient, and asked her to call me because she did not answer her phone about plan to get her medications are less expensive price which medication would she prefer, Copaxone or Aubagio

## 2024-08-06 DIAGNOSIS — G37.3 ACUTE TRANSVERSE MYELITIS IN DEMYELINATING DISEASE OF CENTRAL NERVOUS SYSTEM (HCC): ICD-10-CM

## 2024-08-06 DIAGNOSIS — Z51.81 THERAPEUTIC DRUG MONITORING: ICD-10-CM

## 2024-08-06 RX ORDER — TERIFLUNOMIDE 14 MG/1
TABLET, FILM COATED ORAL
Qty: 90 TABLET | Refills: 4 | Status: ACTIVE | OUTPATIENT
Start: 2024-08-06

## 2024-08-06 NOTE — PROGRESS NOTES
I called the patient, he is having difficulty with patient assistance, from the brand-name, so we will phone in a new prescription for generic Aubagio, as she should get that covered by her insurance since she has commercial insurance

## 2024-10-23 ENCOUNTER — PATIENT MESSAGE (OUTPATIENT)
Age: 54
End: 2024-10-23

## 2024-10-25 NOTE — TELEPHONE ENCOUNTER
Called [employer] number on file [phone]  Left VM asking for a call back to clarify and discuss MS treatments

## 2024-10-30 NOTE — TELEPHONE ENCOUNTER
Called [employer] number on file [phone]   Left VM asking for a call back to discuss and get more information

## 2024-11-15 ENCOUNTER — OFFICE VISIT (OUTPATIENT)
Age: 54
End: 2024-11-15

## 2024-11-15 VITALS
SYSTOLIC BLOOD PRESSURE: 133 MMHG | OXYGEN SATURATION: 97 % | WEIGHT: 153.2 LBS | DIASTOLIC BLOOD PRESSURE: 91 MMHG | HEART RATE: 86 BPM | TEMPERATURE: 98.5 F | BODY MASS INDEX: 24.73 KG/M2

## 2024-11-15 DIAGNOSIS — J06.9 UPPER RESPIRATORY TRACT INFECTION, UNSPECIFIED TYPE: Primary | ICD-10-CM

## 2024-11-15 DIAGNOSIS — J45.20 MILD INTERMITTENT ASTHMA WITHOUT COMPLICATION: ICD-10-CM

## 2024-11-15 DIAGNOSIS — J02.9 SORE THROAT: ICD-10-CM

## 2024-11-15 LAB — S PYO AG THROAT QL: NORMAL

## 2024-11-15 RX ORDER — ALBUTEROL SULFATE 90 UG/1
INHALANT RESPIRATORY (INHALATION)
Qty: 18 G | Refills: 0 | Status: SHIPPED | OUTPATIENT
Start: 2024-11-15

## 2024-11-15 RX ORDER — BENZONATATE 200 MG/1
200 CAPSULE ORAL 3 TIMES DAILY PRN
Qty: 30 CAPSULE | Refills: 0 | Status: SHIPPED | OUTPATIENT
Start: 2024-11-15 | End: 2024-11-25

## 2024-11-15 RX ORDER — METHYLPREDNISOLONE 4 MG/1
TABLET ORAL
Qty: 1 KIT | Refills: 0 | Status: SHIPPED | OUTPATIENT
Start: 2024-11-15

## 2024-11-15 NOTE — PROGRESS NOTES
Luis Fernando Yan (:  1970) is a 54 y.o. female,New patient, here for evaluation of the following chief complaint(s):  Pharyngitis (Sore throat, cough and congestion since last Friday, also coughing little blood)        SUBJECTIVE/OBJECTIVE:    History provided by:  Patient  Pharyngitis       54 y.o. female presents with symptoms of sore throat and cough that started a week ago. Sore throat initially felt like \"razor blades\" in the throat, then cough started and the cough is worsening. Coughing up \"copious amounf of mucus,\" some blood, streaks of blood, denies any large amounts, some pinkness to the mucus but now it is clear. Cannot stop coughing. Also admits to nasal congestion. A little shortness of breath. No fevers or chills. Sore throat still there just not as badly, still hurts to swallow.    History of asthma on albuterol as needed. States she has an inhaler but it is currently . She is not a smoker.  She denies any history of diabetes.        Vitals:    11/15/24 1242   BP: (!) 133/91   Site: Left Upper Arm   Position: Sitting   Cuff Size: Medium Adult   Pulse: 86   Temp: 98.5 °F (36.9 °C)   SpO2: 97%   Weight: 69.5 kg (153 lb 3.2 oz)       Results for orders placed or performed in visit on 11/15/24   POCT rapid strep A   Result Value Ref Range    Strep A Ag None Detected None Detected        Physical Exam  Constitutional:       General: She is not in acute distress.     Appearance: Normal appearance. She is not ill-appearing or toxic-appearing.   HENT:      Head: Normocephalic and atraumatic.      Right Ear: Tympanic membrane, ear canal and external ear normal.      Left Ear: Tympanic membrane, ear canal and external ear normal.      Nose: Congestion present.      Mouth/Throat:      Mouth: Mucous membranes are moist.      Pharynx: Posterior oropharyngeal erythema present. No oropharyngeal exudate.   Eyes:      General:         Right eye: No discharge.         Left eye: No discharge.

## 2024-11-17 LAB
BACTERIA SPEC CULT: ABNORMAL
BACTERIA SPEC CULT: ABNORMAL
SERVICE CMNT-IMP: ABNORMAL

## 2024-11-20 DIAGNOSIS — J02.0 STREP PHARYNGITIS: Primary | ICD-10-CM

## 2024-11-20 RX ORDER — AMOXICILLIN 500 MG/1
500 CAPSULE ORAL 2 TIMES DAILY
Qty: 20 CAPSULE | Refills: 0 | Status: SHIPPED | OUTPATIENT
Start: 2024-11-20 | End: 2024-11-30

## 2025-01-31 ENCOUNTER — TELEPHONE (OUTPATIENT)
Age: 55
End: 2025-01-31

## 2025-01-31 ENCOUNTER — CLINICAL DOCUMENTATION (OUTPATIENT)
Age: 55
End: 2025-01-31

## 2025-01-31 NOTE — PROGRESS NOTES
Patient having numbness of her left leg for the last month, and not on any disease modifying drugs, we told her to come into the office on Wednesday 7:30 in the morning we will try to work urine and reevaluate her for exacerbation of her MS and for disease modifying drugs.

## 2025-01-31 NOTE — TELEPHONE ENCOUNTER
Patient states she spoke to Dr. Parish. Stated that he told her that she may have to go back onto copaxone states that [employer] no longer has the assistance program.  Patient states she is ok with going back to COPAXONE  Patient states she is no longer on a DMD and she is starting to have L leg numbness

## 2025-02-05 ENCOUNTER — OFFICE VISIT (OUTPATIENT)
Age: 55
End: 2025-02-05
Payer: COMMERCIAL

## 2025-02-05 VITALS
SYSTOLIC BLOOD PRESSURE: 136 MMHG | RESPIRATION RATE: 17 BRPM | OXYGEN SATURATION: 100 % | BODY MASS INDEX: 25.33 KG/M2 | HEART RATE: 95 BPM | HEIGHT: 66 IN | TEMPERATURE: 97.6 F | DIASTOLIC BLOOD PRESSURE: 80 MMHG | WEIGHT: 157.6 LBS

## 2025-02-05 DIAGNOSIS — G37.3 ACUTE TRANSVERSE MYELITIS IN DEMYELINATING DISEASE OF CENTRAL NERVOUS SYSTEM (HCC): Primary | ICD-10-CM

## 2025-02-05 DIAGNOSIS — G35 MULTIPLE SCLEROSIS (HCC): ICD-10-CM

## 2025-02-05 DIAGNOSIS — G37.3 ACUTE TRANSVERSE MYELITIS IN DEMYELINATING DISEASE OF CENTRAL NERVOUS SYSTEM (HCC): ICD-10-CM

## 2025-02-05 DIAGNOSIS — Z51.81 THERAPEUTIC DRUG MONITORING: ICD-10-CM

## 2025-02-05 LAB
ALBUMIN SERPL-MCNC: 3.9 G/DL (ref 3.5–5)
ALBUMIN/GLOB SERPL: 1.2 (ref 1.1–2.2)
ALP SERPL-CCNC: 56 U/L (ref 45–117)
ALT SERPL-CCNC: 61 U/L (ref 12–78)
ANION GAP SERPL CALC-SCNC: 4 MMOL/L (ref 2–12)
AST SERPL-CCNC: 36 U/L (ref 15–37)
BASOPHILS # BLD: 0.05 K/UL (ref 0–0.1)
BASOPHILS NFR BLD: 1 % (ref 0–1)
BILIRUB SERPL-MCNC: 0.5 MG/DL (ref 0.2–1)
BUN SERPL-MCNC: 8 MG/DL (ref 6–20)
BUN/CREAT SERPL: 15 (ref 12–20)
CALCIUM SERPL-MCNC: 9 MG/DL (ref 8.5–10.1)
CHLORIDE SERPL-SCNC: 111 MMOL/L (ref 97–108)
CO2 SERPL-SCNC: 25 MMOL/L (ref 21–32)
CREAT SERPL-MCNC: 0.55 MG/DL (ref 0.55–1.02)
DIFFERENTIAL METHOD BLD: ABNORMAL
EOSINOPHIL # BLD: 0.3 K/UL (ref 0–0.4)
EOSINOPHIL NFR BLD: 5.7 % (ref 0–7)
ERYTHROCYTE [DISTWIDTH] IN BLOOD BY AUTOMATED COUNT: 14.8 % (ref 11.5–14.5)
GLOBULIN SER CALC-MCNC: 3.3 G/DL (ref 2–4)
GLUCOSE SERPL-MCNC: 97 MG/DL (ref 65–100)
HCT VFR BLD AUTO: 39.7 % (ref 35–47)
HGB BLD-MCNC: 12.7 G/DL (ref 11.5–16)
IMM GRANULOCYTES # BLD AUTO: 0.01 K/UL (ref 0–0.04)
IMM GRANULOCYTES NFR BLD AUTO: 0.2 % (ref 0–0.5)
LYMPHOCYTES # BLD: 1.4 K/UL (ref 0.8–3.5)
LYMPHOCYTES NFR BLD: 26.7 % (ref 12–49)
MCH RBC QN AUTO: 29.8 PG (ref 26–34)
MCHC RBC AUTO-ENTMCNC: 32 G/DL (ref 30–36.5)
MCV RBC AUTO: 93.2 FL (ref 80–99)
MONOCYTES # BLD: 0.56 K/UL (ref 0–1)
MONOCYTES NFR BLD: 10.7 % (ref 5–13)
NEUTS SEG # BLD: 2.93 K/UL (ref 1.8–8)
NEUTS SEG NFR BLD: 55.7 % (ref 32–75)
NRBC # BLD: 0 K/UL (ref 0–0.01)
NRBC BLD-RTO: 0 PER 100 WBC
PLATELET # BLD AUTO: 246 K/UL (ref 150–400)
PMV BLD AUTO: 12.3 FL (ref 8.9–12.9)
POTASSIUM SERPL-SCNC: 4 MMOL/L (ref 3.5–5.1)
PROT SERPL-MCNC: 7.2 G/DL (ref 6.4–8.2)
RBC # BLD AUTO: 4.26 M/UL (ref 3.8–5.2)
SODIUM SERPL-SCNC: 140 MMOL/L (ref 136–145)
WBC # BLD AUTO: 5.3 K/UL (ref 3.6–11)

## 2025-02-05 PROCEDURE — 99214 OFFICE O/P EST MOD 30 MIN: CPT | Performed by: PSYCHIATRY & NEUROLOGY

## 2025-02-05 RX ORDER — TERIFLUNOMIDE 14 MG/1
14 TABLET, FILM COATED ORAL DAILY
Qty: 30 TABLET | Refills: 11 | Status: ACTIVE | OUTPATIENT
Start: 2025-02-05

## 2025-02-05 ASSESSMENT — PATIENT HEALTH QUESTIONNAIRE - PHQ9
SUM OF ALL RESPONSES TO PHQ QUESTIONS 1-9: 0
SUM OF ALL RESPONSES TO PHQ QUESTIONS 1-9: 0
SUM OF ALL RESPONSES TO PHQ9 QUESTIONS 1 & 2: 0
SUM OF ALL RESPONSES TO PHQ QUESTIONS 1-9: 0
1. LITTLE INTEREST OR PLEASURE IN DOING THINGS: NOT AT ALL
2. FEELING DOWN, DEPRESSED OR HOPELESS: NOT AT ALL
SUM OF ALL RESPONSES TO PHQ QUESTIONS 1-9: 0

## 2025-02-06 NOTE — PROGRESS NOTES
Consult  REFERRED BY:  Jorge Alberto Johnson MD    CHIEF COMPLAINT: Patient seen on urgent work in basis for new problem of patient having weakness in her left leg, and some in her right leg, but has been present for about 3 to 4 weeks and persistent and the patient feeling that she is dragging her left leg more and is concerned that she is having an exacerbation of her MS, because he been off her MS medication due to insurance issues for over a year now.  She has had no change in bowel and bladder function and no difficulty with her arms.  She has not had an MRI scan done in about 5 years, because previously she was doing well.      Subjective:     Luis Fernando Yan is a 54 y.o.right-handed -American female, seen on urgent work in basis at the request of Dr. Johnson for new problem of Patient seen on urgent work in basis for new problem of patient having weakness in her left leg, and some in her right leg, but has been present for about 3 to 4 weeks and persistent and the patient feeling that she is dragging her left leg more and is concerned that she is having an exacerbation of her MS, because he been off her MS medication due to insurance issues for over a year now.  She has had no change in bowel and bladder function and no difficulty with her arms.  She has not had an MRI scan done in about 5 years, because previously she was doing well.  She had no unusual fever, or other medical illnesses in the interim nothing else to account for her weakness.  He would like to go back on generic Aubagio if the co-pay is not too great so we will represcribe that get her started on disease modifying drugs.  We discussed the risk and benefits of all these drugs with the patient in addition.  Other options may be Zeposia or Vumerity.  She does not want any more injectables after trying interferons and Copaxone in the past. We discussed other therapeutic options with the patient in detail, and explained the risk and benefits, and

## 2025-02-12 LAB
M TB IFN-G BLD-IMP: NEGATIVE
M TB IFN-G CD4+ T-CELLS BLD-ACNC: 0.25 IU/ML
M TBIFN-G CD4+ CD8+T-CELLS BLD-ACNC: 0.24 IU/ML
QUANTIFERON CRITERIA: NORMAL
QUANTIFERON MITOGEN VALUE: >10 IU/ML
QUANTIFERON NIL VALUE: 0.12 IU/ML

## 2025-05-13 ENCOUNTER — CLINICAL DOCUMENTATION (OUTPATIENT)
Age: 55
End: 2025-05-13

## 2025-05-13 ENCOUNTER — OFFICE VISIT (OUTPATIENT)
Age: 55
End: 2025-05-13
Payer: COMMERCIAL

## 2025-05-13 VITALS
RESPIRATION RATE: 15 BRPM | OXYGEN SATURATION: 100 % | SYSTOLIC BLOOD PRESSURE: 110 MMHG | HEART RATE: 98 BPM | WEIGHT: 159.4 LBS | TEMPERATURE: 97.3 F | BODY MASS INDEX: 25.62 KG/M2 | HEIGHT: 66 IN | DIASTOLIC BLOOD PRESSURE: 78 MMHG

## 2025-05-13 DIAGNOSIS — M62.81 LEFT-SIDED MUSCLE WEAKNESS: ICD-10-CM

## 2025-05-13 DIAGNOSIS — Z51.81 THERAPEUTIC DRUG MONITORING: ICD-10-CM

## 2025-05-13 DIAGNOSIS — G35 MULTIPLE SCLEROSIS (HCC): Primary | ICD-10-CM

## 2025-05-13 DIAGNOSIS — F41.1 GENERALIZED ANXIETY DISORDER: ICD-10-CM

## 2025-05-13 PROCEDURE — 99214 OFFICE O/P EST MOD 30 MIN: CPT | Performed by: PSYCHIATRY & NEUROLOGY

## 2025-05-13 RX ORDER — OZANIMOD HYDROCHLORIDE 0.92 MG/1
1 CAPSULE ORAL
Qty: 30 CAPSULE | Refills: 11 | Status: ACTIVE | OUTPATIENT
Start: 2025-05-13

## 2025-05-13 RX ORDER — OZANIMOD HYDROCHLORIDE 0.23-0.46
KIT ORAL
Qty: 1 EACH | Refills: 0 | Status: ACTIVE | OUTPATIENT
Start: 2025-05-13 | End: 2025-05-20

## 2025-05-13 ASSESSMENT — PATIENT HEALTH QUESTIONNAIRE - PHQ9
2. FEELING DOWN, DEPRESSED OR HOPELESS: NOT AT ALL
1. LITTLE INTEREST OR PLEASURE IN DOING THINGS: NOT AT ALL
SUM OF ALL RESPONSES TO PHQ QUESTIONS 1-9: 0

## 2025-05-13 NOTE — PROGRESS NOTES
[facility] support. Received result success, sent to the front for scanning.

## 2025-05-13 NOTE — PROGRESS NOTES
Consult  REFERRED BY:  Jorge Alberto Johnson MD    CHIEF COMPLAINT: Patient seen for new problem of persistent nausea and increased headaches and feeling bad on the Aubagio 14 mg, and is taking it with food and still having all these problems and wants to switch her medication.  After talking about the risk and benefits of the medications we did recommend she may be try Zeposia, and she agrees so we filled out a starter form for her today, we will switch her to that medication for her MS.  Her left-sided weakness that we saw her for previously has gotten better, but she never got her MRI scans so they were reordered today and his daughter form was sent in for her new medication.  Patient had a flareup with left-sided weakness in February 2025 when she was last seen.    Subjective:     Luis Fernando Yan is a 54 y.o.right-handed -American female, seen on urgent work in basis at the request of Dr. Johnson for new problem of Patient seen for new problem of persistent nausea and increased headaches and feeling bad on the Aubagio 14 mg, and is taking it with food and still having all these problems and wants to switch her medication.  After talking about the risk and benefits of the medications we did recommend she may be try Zeposia, and she agrees so we filled out a starter form for her today, we will switch her to that medication for her MS.  Her left-sided weakness that we saw her for previously has gotten better, but she never got her MRI scans so they were reordered today and his daughter form was sent in for her new medication.  Patient had a flareup with left-sided weakness in February 2025 when she was last seen.  She has had no change in bowel and bladder function and no difficulty with her arms.  She has not had an MRI scan done in about 5 years, because previously she was doing well.  She had no unusual fever, or other medical illnesses in the interim nothing else to account for her weakness.  She does not want

## 2025-05-21 ENCOUNTER — TELEPHONE (OUTPATIENT)
Age: 55
End: 2025-05-21

## 2025-05-21 DIAGNOSIS — G35 MULTIPLE SCLEROSIS (HCC): Primary | ICD-10-CM

## 2025-05-21 RX ORDER — TERIFLUNOMIDE 14 MG/1
14 TABLET, FILM COATED ORAL
Qty: 30 TABLET | Refills: 11 | Status: ACTIVE | OUTPATIENT
Start: 2025-05-21

## 2025-05-21 NOTE — TELEPHONE ENCOUNTER
Effie is calling regaring the Zeposia enrollment form they received. Effie states that it is missing the providers signature. The form can either be refaxed or submitted electronically on [facility] key: [id number]

## 2025-05-27 ENCOUNTER — TELEPHONE (OUTPATIENT)
Age: 55
End: 2025-05-27

## 2025-05-27 ENCOUNTER — CLINICAL DOCUMENTATION (OUTPATIENT)
Age: 55
End: 2025-05-27

## 2025-05-27 NOTE — TELEPHONE ENCOUNTER
Julien ho/ Abril weems called to let us know that both MRI's have been approved through 6/25/2025.    MRI CERVICAL SPINE W WO CONTRAST   Auth #: [id number]    MRI BRAIN W WO CONTRAST   Auth #: [id number]

## 2025-05-27 NOTE — PROGRESS NOTES
Re faxed Neurology start [facility] to [facility]. Received result success, sent to the front for scanning.

## 2025-05-28 NOTE — TELEPHONE ENCOUNTER
Nicki with [employer] is going to refax the enrollment form for Zeposia to us today because they are still missing the prescribers signature.  Thank you.

## 2025-05-29 ENCOUNTER — HOSPITAL ENCOUNTER (OUTPATIENT)
Facility: HOSPITAL | Age: 55
End: 2025-05-29
Attending: PSYCHIATRY & NEUROLOGY
Payer: COMMERCIAL

## 2025-05-29 ENCOUNTER — HOSPITAL ENCOUNTER (OUTPATIENT)
Facility: HOSPITAL | Age: 55
Discharge: HOME | End: 2025-05-29
Attending: PSYCHIATRY & NEUROLOGY
Payer: COMMERCIAL

## 2025-05-29 VITALS — WEIGHT: 159 LBS | BODY MASS INDEX: 25.66 KG/M2

## 2025-05-29 DIAGNOSIS — G35 MULTIPLE SCLEROSIS (HCC): ICD-10-CM

## 2025-05-29 PROCEDURE — A9579 GAD-BASE MR CONTRAST NOS,1ML: HCPCS | Performed by: RADIOLOGY

## 2025-05-29 PROCEDURE — 72156 MRI NECK SPINE W/O & W/DYE: CPT

## 2025-05-29 PROCEDURE — 70553 MRI BRAIN STEM W/O & W/DYE: CPT

## 2025-05-29 PROCEDURE — 6360000004 HC RX CONTRAST MEDICATION: Performed by: RADIOLOGY

## 2025-05-29 RX ADMIN — GADOTERIDOL 14 ML: 279.3 INJECTION, SOLUTION INTRAVENOUS at 14:01

## 2025-05-30 DIAGNOSIS — G35 MULTIPLE SCLEROSIS (HCC): Primary | ICD-10-CM

## 2025-05-30 DIAGNOSIS — G37.3 ACUTE TRANSVERSE MYELITIS IN DEMYELINATING DISEASE OF CENTRAL NERVOUS SYSTEM (HCC): ICD-10-CM

## 2025-05-30 RX ORDER — GABAPENTIN 300 MG/1
300 CAPSULE ORAL 3 TIMES DAILY PRN
Qty: 90 CAPSULE | Refills: 4 | Status: SHIPPED | OUTPATIENT
Start: 2025-05-30 | End: 2025-12-01

## 2025-05-30 NOTE — PROGRESS NOTES
I called the patient, let her know the MRI of the brain and cervical spine were stable there were no new lesions, and that she is having a new problem of numbness and tingling in her hands and feet, and has had that in the past take gabapentin with the go ahead for prescription for gabapentin 300 mg take 3 times a day as needed for any study 1 July when she returns from her trip [place] for which she is going to leave next week

## 2025-07-13 ENCOUNTER — HOSPITAL ENCOUNTER (EMERGENCY)
Facility: HOSPITAL | Age: 55
Discharge: HOME | End: 2025-07-13
Attending: STUDENT IN AN ORGANIZED HEALTH CARE EDUCATION/TRAINING PROGRAM
Payer: COMMERCIAL

## 2025-07-13 ENCOUNTER — APPOINTMENT (OUTPATIENT)
Facility: HOSPITAL | Age: 55
End: 2025-07-13
Payer: COMMERCIAL

## 2025-07-13 VITALS
OXYGEN SATURATION: 100 % | WEIGHT: 167.77 LBS | TEMPERATURE: 99.5 F | DIASTOLIC BLOOD PRESSURE: 91 MMHG | SYSTOLIC BLOOD PRESSURE: 126 MMHG | BODY MASS INDEX: 26.96 KG/M2 | RESPIRATION RATE: 16 BRPM | HEIGHT: 66 IN | HEART RATE: 103 BPM

## 2025-07-13 DIAGNOSIS — U07.1 COVID-19: Primary | ICD-10-CM

## 2025-07-13 LAB
FLUAV RNA SPEC QL NAA+PROBE: NOT DETECTED
FLUBV RNA SPEC QL NAA+PROBE: NOT DETECTED
SARS-COV-2 RNA RESP QL NAA+PROBE: DETECTED
SOURCE: ABNORMAL

## 2025-07-13 PROCEDURE — 99284 EMERGENCY DEPT VISIT MOD MDM: CPT

## 2025-07-13 PROCEDURE — 96372 THER/PROPH/DIAG INJ SC/IM: CPT

## 2025-07-13 PROCEDURE — 6360000002 HC RX W HCPCS: Performed by: STUDENT IN AN ORGANIZED HEALTH CARE EDUCATION/TRAINING PROGRAM

## 2025-07-13 PROCEDURE — 6370000000 HC RX 637 (ALT 250 FOR IP): Performed by: STUDENT IN AN ORGANIZED HEALTH CARE EDUCATION/TRAINING PROGRAM

## 2025-07-13 PROCEDURE — 87636 SARSCOV2 & INF A&B AMP PRB: CPT

## 2025-07-13 PROCEDURE — 71046 X-RAY EXAM CHEST 2 VIEWS: CPT

## 2025-07-13 RX ORDER — ACETAMINOPHEN 500 MG
1000 TABLET ORAL
Status: COMPLETED | OUTPATIENT
Start: 2025-07-13 | End: 2025-07-13

## 2025-07-13 RX ORDER — KETOROLAC TROMETHAMINE 30 MG/ML
30 INJECTION, SOLUTION INTRAMUSCULAR; INTRAVENOUS ONCE
Status: COMPLETED | OUTPATIENT
Start: 2025-07-13 | End: 2025-07-13

## 2025-07-13 RX ADMIN — KETOROLAC TROMETHAMINE 30 MG: 30 INJECTION, SOLUTION INTRAMUSCULAR at 19:46

## 2025-07-13 RX ADMIN — ACETAMINOPHEN 1000 MG: 500 TABLET ORAL at 19:46

## 2025-07-13 ASSESSMENT — PAIN SCALES - GENERAL: PAINLEVEL_OUTOF10: 9

## 2025-07-13 ASSESSMENT — PAIN - FUNCTIONAL ASSESSMENT: PAIN_FUNCTIONAL_ASSESSMENT: 0-10

## 2025-07-13 ASSESSMENT — PAIN DESCRIPTION - DESCRIPTORS: DESCRIPTORS: THROBBING

## 2025-07-13 ASSESSMENT — PAIN DESCRIPTION - ORIENTATION: ORIENTATION: RIGHT;LEFT

## 2025-07-13 ASSESSMENT — PAIN DESCRIPTION - LOCATION: LOCATION: LEG

## 2025-07-13 NOTE — ED TRIAGE NOTES
Pt ambulatory to ED with c/o flu like symptoms: reports coughing up blood, BLE pain, chills, sore and sore throat x 4 days. Pt reports coughing up mucus with strands of blood.   Pt was recently [place] a [place]  Pt was bitten by spiders in [place] last month.    MD Oakley in triage to assess patient.

## 2025-07-13 NOTE — ED PROVIDER NOTES
[facility] EMERGENCY DEPARTMENT  EMERGENCY DEPARTMENT ENCOUNTER      Pt Name: Luis Fernando Yan  MRN: [id number]  Birthdate 1970  Date of evaluation: 7/13/2025  Provider: Desmond Oakley MD    CHIEF COMPLAINT       Chief Complaint   Patient presents with    flu like symptoms         HISTORY OF PRESENT ILLNESS   54 female with history significant for asthma and multiple sclerosis presents to the ED with chief complaint of cough for the past 3 to 4 days.  Patient reports associated fever and said she noticed flecks of blood in her cough today.  She has also had bodyaches.  No chest pain, shortness of breath, abdominal pain, urinary symptoms, or bowel symptoms.  She has had a sore throat.  No treatment prior to arrival today.    The history is provided by the patient.       Review of External Medical Records:     Nursing Notes were reviewed.    REVIEW OF SYSTEMS       Review of Systems   Respiratory:  Negative for shortness of breath.    Cardiovascular:  Negative for chest pain.       Except as noted above the remainder of the review of systems was reviewed and negative.       PAST MEDICAL HISTORY     Past Medical History:   Diagnosis Date    Asthma     MS (multiple sclerosis) (HCC)          SURGICAL HISTORY       Past Surgical History:   Procedure Laterality Date    APPENDECTOMY      CHOLECYSTECTOMY      GYN      c/sec         CURRENT MEDICATIONS       Previous Medications    ALBUTEROL SULFATE HFA (PROAIR HFA) 108 (90 BASE) MCG/ACT INHALER    TAKE 1-2 PUFFS BY INHALATION EVERY FOUR (4) HOURS AS NEEDED.    ALPRAZOLAM (XANAX) 2 MG TABLET    Take 1 tablet by mouth daily as needed.    DICLOFENAC (VOLTAREN) 50 MG EC TABLET    Take 1 tablet by mouth 3 times daily (with meals)    GABAPENTIN (NEURONTIN) 300 MG CAPSULE    Take 1 capsule by mouth 3 times daily as needed (pain) for up to 185 days. Max Daily Amount: 900 mg    KETOCONAZOLE (NIZORAL) 2 % SHAMPOO    APPLY A DIME SIZE AMOUNT AS NEEDED TO